# Patient Record
Sex: MALE | Race: WHITE | NOT HISPANIC OR LATINO | Employment: FULL TIME | URBAN - METROPOLITAN AREA
[De-identification: names, ages, dates, MRNs, and addresses within clinical notes are randomized per-mention and may not be internally consistent; named-entity substitution may affect disease eponyms.]

---

## 2017-02-06 RX ORDER — FLUOXETINE 20 MG/1
20 TABLET, FILM COATED ORAL DAILY
Qty: 30 TABLET | Refills: 0 | Status: SHIPPED | OUTPATIENT
Start: 2017-02-06 | End: 2017-03-01

## 2017-03-01 ENCOUNTER — OFFICE VISIT (OUTPATIENT)
Dept: INTERNAL MEDICINE | Facility: CLINIC | Age: 22
End: 2017-03-01

## 2017-03-01 VITALS
HEIGHT: 69 IN | DIASTOLIC BLOOD PRESSURE: 60 MMHG | WEIGHT: 164.6 LBS | SYSTOLIC BLOOD PRESSURE: 110 MMHG | OXYGEN SATURATION: 99 % | HEART RATE: 78 BPM | BODY MASS INDEX: 24.38 KG/M2

## 2017-03-01 DIAGNOSIS — R00.2 HEART PALPITATIONS: ICD-10-CM

## 2017-03-01 DIAGNOSIS — J20.9 ACUTE BRONCHITIS, UNSPECIFIED ORGANISM: ICD-10-CM

## 2017-03-01 DIAGNOSIS — R53.83 OTHER FATIGUE: Primary | ICD-10-CM

## 2017-03-01 PROCEDURE — 99213 OFFICE O/P EST LOW 20 MIN: CPT | Performed by: INTERNAL MEDICINE

## 2017-03-01 RX ORDER — ATENOLOL 25 MG/1
25 TABLET ORAL DAILY
Qty: 30 TABLET | Refills: 2
Start: 2017-03-01 | End: 2017-09-09 | Stop reason: SDUPTHER

## 2017-03-01 NOTE — PROGRESS NOTES
"Follow-up (requesting to have testosterone checked)    Subjective   Blade Robertson is a 21 y.o. male is here today for follow-up.    History of Present Illness   Blade reports that  He has been off the prozac appx 6mos, , also quit smoking and feels like he is doing better.  Feels like his metabolism is not good, and not gaining weight despite all the eating and the muscle building.  To get his wisdom teeth extracted tomorrow.  Atenolol helps with anxiety , taking prn      Current Outpatient Prescriptions:   •  atenolol (TENORMIN) 25 MG tablet, Take 1 tablet by mouth Daily., Disp: 30 tablet, Rfl: 2  •  azithromycin (ZITHROMAX) 250 MG tablet, Take 2 tablets the first day, then 1 tablet daily for 4 days., Disp: 6 tablet, Rfl: 0  •  fluticasone (FLONASE) 50 MCG/ACT nasal spray, 2 sprays into each nostril Daily., Disp: 1 each, Rfl: 2      The following portions of the patient's history were reviewed and updated as appropriate: allergies, current medications, past family history, past medical history, past social history, past surgical history and problem list.    Review of Systems   Constitutional: Positive for appetite change and fatigue. Negative for chills and fever.   HENT: Negative for ear discharge, ear pain, sinus pressure and sore throat.    Respiratory: Negative for cough, chest tightness and shortness of breath.    Cardiovascular: Negative for chest pain, palpitations and leg swelling.   Gastrointestinal: Negative for diarrhea, nausea and vomiting.   Musculoskeletal: Negative for arthralgias, back pain and myalgias.   Neurological: Negative for dizziness, syncope and headaches.   Psychiatric/Behavioral: Negative for confusion and sleep disturbance.       Objective   Visit Vitals   • /60   • Pulse 78   • Ht 69\" (175.3 cm)   • Wt 164 lb 9.6 oz (74.7 kg)   • SpO2 99%  Comment: ra   • BMI 24.31 kg/m2     Physical Exam   Constitutional: He is oriented to person, place, and time. He appears well-developed and " well-nourished.   HENT:   Head: Normocephalic and atraumatic.   Right Ear: External ear normal.   Left Ear: External ear normal.   Mouth/Throat: No oropharyngeal exudate.   Eyes: Conjunctivae are normal. Pupils are equal, round, and reactive to light.   Neck: Neck supple. No thyromegaly present.   Cardiovascular: Normal rate, regular rhythm and intact distal pulses.    Pulmonary/Chest: Effort normal and breath sounds normal.   Abdominal: Soft. Bowel sounds are normal. He exhibits no distension. There is no tenderness.   Musculoskeletal: He exhibits no edema.   Neurological: He is alert and oriented to person, place, and time. No cranial nerve deficit.   Skin: Skin is warm and dry.   Psychiatric: He has a normal mood and affect. Judgment normal.   Nursing note and vitals reviewed.        Results for orders placed or performed during the hospital encounter of 09/18/15   C.trachomatis/N. gonorrhoeae PCR   Result Value Ref Range    Chlamydia trachomatis, NAV Negative Negative    Neisseria gonorrhoeae, NAV Negative Negative    Please note See comments    Basic metabolic panel   Result Value Ref Range    Glucose 88 70 - 100 mg/dL    BUN 11 9 - 23 mg/dL    Creatinine 0.7 0.6 - 1.3 mg/dL    Sodium 141 132 - 146 mmol/L    Potassium 3.6 3.5 - 5.5 mmol/L    Chloride 106 99 - 109 mmol/L    CO2 27 20 - 31 mmol/L    Calcium 9.4 8.7 - 10.4 mg/dL    Est GFR by Clearance 141 ml/min/1.732    Anion Gap 8 3 - 11 mmol/L   PSA   Result Value Ref Range    PSA 0.40 0.00 - 4.00 ng/mL   CBC and Differential   Result Value Ref Range    WBC 10.85 (H) 3.50 - 10.80 K/mcL    RBC 4.97 4.20 - 5.76 M/mcL    Hemoglobin 14.9 13.1 - 17.5 g/dL    Hematocrit 41.9 38.9 - 50.9 %    MCV 84.3 80.0 - 99.0 fL    MCH 30.0 27.0 - 31.0 pg    MCHC 35.6 32.0 - 36.0 g/dL    RDW-CV 12.2 11.3 - 14.5 %    Platelets 189 150 - 450 K/mcL    Neutrophils Absolute 5.16 1.50 - 8.30 K/mcL    Lymphocytes Absolute 4.55 0.60 - 4.80 K/mcL    Monocytes Absolute 0.84 0.00 - 1.00  K/mcL    Eosinophils Absolute 0.23 0.10 - 0.30 K/mcL    Basophils Absolute 0.05 0.00 - 0.20 K/mcL    Neutrophil Rel % 47.6 41.0 - 71.0 %    Lymphocyte Rel % 41.9 24.0 - 44.0 %    Monocyte Rel % 7.7 0.0 - 12.0 %    Eosinophil Rel % 2.1 0.0 - 3.0 %    Basophil Rel % 0.5 0.0 - 1.0 %    Immature Granulocyte Rel % 0.2 0.0 - 0.6 %             Assessment/Plan   Diagnoses and all orders for this visit:    Other fatigue  -     Testosterone, Free, Total  -     Comprehensive Metabolic Panel  -     TSH  -     CBC (No Diff)  -     atenolol (TENORMIN) 25 MG tablet; Take 1 tablet by mouth Daily.    Heart palpitations  -     Testosterone, Free, Total  -     Comprehensive Metabolic Panel  -     TSH  -     CBC (No Diff)  -     atenolol (TENORMIN) 25 MG tablet; Take 1 tablet by mouth Daily.    Acute bronchitis, unspecified organism  -     fluticasone (FLONASE) 50 MCG/ACT nasal spray; 2 sprays into each nostril Daily.  -     azithromycin (ZITHROMAX) 250 MG tablet; Take 2 tablets the first day, then 1 tablet daily for 4 days.      Check labs, and recs pending results.  counselled to continue to exercise.  Commended on his quitting smoking.       Return in about 6 months (around 9/1/2017) for Next scheduled follow up.

## 2017-03-03 LAB
ALBUMIN SERPL-MCNC: 4.2 G/DL (ref 3.2–4.8)
ALBUMIN/GLOB SERPL: 1.2 G/DL (ref 1.5–2.5)
ALP SERPL-CCNC: 100 U/L (ref 25–100)
ALT SERPL W P-5'-P-CCNC: 24 U/L (ref 7–40)
ANION GAP SERPL CALCULATED.3IONS-SCNC: 3 MMOL/L (ref 3–11)
AST SERPL-CCNC: 26 U/L (ref 0–33)
BILIRUB SERPL-MCNC: 1.9 MG/DL (ref 0.3–1.2)
BUN BLD-MCNC: 22 MG/DL (ref 9–23)
BUN/CREAT SERPL: 31.4 (ref 7–25)
CALCIUM SPEC-SCNC: 9.4 MG/DL (ref 8.7–10.4)
CHLORIDE SERPL-SCNC: 103 MMOL/L (ref 99–109)
CO2 SERPL-SCNC: 33 MMOL/L (ref 20–31)
CREAT BLD-MCNC: 0.7 MG/DL (ref 0.6–1.3)
DEPRECATED RDW RBC AUTO: 39.5 FL (ref 37–54)
ERYTHROCYTE [DISTWIDTH] IN BLOOD BY AUTOMATED COUNT: 12.3 % (ref 11.3–14.5)
GFR SERPL CREATININE-BSD FRML MDRD: 140 ML/MIN/1.73
GLOBULIN UR ELPH-MCNC: 3.6 GM/DL
GLUCOSE BLD-MCNC: 91 MG/DL (ref 70–100)
HCT VFR BLD AUTO: 43.7 % (ref 38.9–50.9)
HGB BLD-MCNC: 15.1 G/DL (ref 13.1–17.5)
MCH RBC QN AUTO: 30.2 PG (ref 27–31)
MCHC RBC AUTO-ENTMCNC: 34.6 G/DL (ref 32–36)
MCV RBC AUTO: 87.4 FL (ref 80–99)
PLATELET # BLD AUTO: 193 10*3/MM3 (ref 150–450)
PMV BLD AUTO: 12 FL (ref 6–12)
POTASSIUM BLD-SCNC: 4.3 MMOL/L (ref 3.5–5.5)
PROT SERPL-MCNC: 7.8 G/DL (ref 5.7–8.2)
RBC # BLD AUTO: 5 10*6/MM3 (ref 4.2–5.76)
SODIUM BLD-SCNC: 139 MMOL/L (ref 132–146)
TSH SERPL DL<=0.05 MIU/L-ACNC: 1.33 MIU/ML (ref 0.35–5.35)
WBC NRBC COR # BLD: 11.18 10*3/MM3 (ref 3.5–10.8)

## 2017-03-03 PROCEDURE — 80053 COMPREHEN METABOLIC PANEL: CPT | Performed by: INTERNAL MEDICINE

## 2017-03-03 PROCEDURE — 85027 COMPLETE CBC AUTOMATED: CPT | Performed by: INTERNAL MEDICINE

## 2017-03-03 PROCEDURE — 84402 ASSAY OF FREE TESTOSTERONE: CPT | Performed by: INTERNAL MEDICINE

## 2017-03-03 PROCEDURE — 84443 ASSAY THYROID STIM HORMONE: CPT | Performed by: INTERNAL MEDICINE

## 2017-03-03 PROCEDURE — 84403 ASSAY OF TOTAL TESTOSTERONE: CPT | Performed by: INTERNAL MEDICINE

## 2017-03-03 RX ORDER — AZITHROMYCIN 250 MG/1
TABLET, FILM COATED ORAL
Qty: 6 TABLET | Refills: 0 | Status: SHIPPED | OUTPATIENT
Start: 2017-03-03 | End: 2017-03-15

## 2017-03-03 RX ORDER — FLUTICASONE PROPIONATE 50 MCG
2 SPRAY, SUSPENSION (ML) NASAL DAILY
Qty: 1 EACH | Refills: 2 | Status: SHIPPED | OUTPATIENT
Start: 2017-03-03 | End: 2017-03-15

## 2017-03-06 LAB
CONV COMMENT: ABNORMAL
TESTOST FREE SERPL-MCNC: 8.5 PG/ML (ref 9.3–26.5)
TESTOST SERPL-MCNC: 266 NG/DL (ref 348–1197)

## 2017-03-15 ENCOUNTER — OFFICE VISIT (OUTPATIENT)
Dept: INTERNAL MEDICINE | Facility: CLINIC | Age: 22
End: 2017-03-15

## 2017-03-15 VITALS
OXYGEN SATURATION: 99 % | BODY MASS INDEX: 23.46 KG/M2 | SYSTOLIC BLOOD PRESSURE: 126 MMHG | HEIGHT: 69 IN | HEART RATE: 74 BPM | WEIGHT: 158.4 LBS | DIASTOLIC BLOOD PRESSURE: 62 MMHG

## 2017-03-15 DIAGNOSIS — E29.1 HYPOGONADISM, TESTICULAR: Primary | ICD-10-CM

## 2017-03-15 PROCEDURE — 99213 OFFICE O/P EST LOW 20 MIN: CPT | Performed by: INTERNAL MEDICINE

## 2017-03-15 NOTE — PROGRESS NOTES
"lab resutls    Subjective   Blade Robertson is a 21 y.o. male is here today for follow-up.    History of Present Illness   Blade is here for follow up on his labs which showed low testosterone levels. He has been concerned due to his lack of muscle build despite his extensive work out.  Labs also showed mildly elevated wbc and dehydration which he attributes to his wisdom tooth extraction the day before.    Current Outpatient Prescriptions:   •  atenolol (TENORMIN) 25 MG tablet, Take 1 tablet by mouth Daily., Disp: 30 tablet, Rfl: 2  •  clomiPHENE (CLOMID) 50 MG tablet, Take 1/2 PO daily for 25 days then off for 5 days, Disp: 25 tablet, Rfl: 2      The following portions of the patient's history were reviewed and updated as appropriate: allergies, current medications, past family history, past medical history, past social history, past surgical history and problem list.    Review of Systems   Constitutional: Positive for fatigue and unexpected weight change (weight loss).   HENT: Positive for congestion (better).    Respiratory: Negative for chest tightness and shortness of breath.    Cardiovascular: Negative for chest pain.       Objective   Visit Vitals   • /62   • Pulse 74   • Ht 69\" (175.3 cm)   • Wt 158 lb 6.4 oz (71.8 kg)   • SpO2 99%  Comment: ra   • BMI 23.39 kg/m2     Physical Exam   Constitutional: He is oriented to person, place, and time. He appears well-developed and well-nourished.   HENT:   Head: Normocephalic and atraumatic.   Eyes: EOM are normal. Pupils are equal, round, and reactive to light.   Cardiovascular: Normal rate and regular rhythm.    Pulmonary/Chest: Effort normal and breath sounds normal.   Neurological: He is alert and oriented to person, place, and time.         Results for orders placed or performed in visit on 03/01/17   Testosterone, Free, Total   Result Value Ref Range    Testosterone, Total 266 (L) 348 - 1197 ng/dL    Comment Comment     Testosterone, Free 8.5 (L) 9.3 - " 26.5 pg/mL   Comprehensive Metabolic Panel   Result Value Ref Range    Glucose 91 70 - 100 mg/dL    BUN 22 9 - 23 mg/dL    Creatinine 0.70 0.60 - 1.30 mg/dL    Sodium 139 132 - 146 mmol/L    Potassium 4.3 3.5 - 5.5 mmol/L    Chloride 103 99 - 109 mmol/L    CO2 33.0 (H) 20.0 - 31.0 mmol/L    Calcium 9.4 8.7 - 10.4 mg/dL    Total Protein 7.8 5.7 - 8.2 g/dL    Albumin 4.20 3.20 - 4.80 g/dL    ALT (SGPT) 24 7 - 40 U/L    AST (SGOT) 26 0 - 33 U/L    Alkaline Phosphatase 100 25 - 100 U/L    Total Bilirubin 1.9 (H) 0.3 - 1.2 mg/dL    eGFR Non African Amer 140 >60 mL/min/1.73    Globulin 3.6 gm/dL    A/G Ratio 1.2 (L) 1.5 - 2.5 g/dL    BUN/Creatinine Ratio 31.4 (H) 7.0 - 25.0    Anion Gap 3.0 3.0 - 11.0 mmol/L   TSH   Result Value Ref Range    TSH 1.327 0.350 - 5.350 mIU/mL   CBC (No Diff)   Result Value Ref Range    WBC 11.18 (H) 3.50 - 10.80 10*3/mm3    RBC 5.00 4.20 - 5.76 10*6/mm3    Hemoglobin 15.1 13.1 - 17.5 g/dL    Hematocrit 43.7 38.9 - 50.9 %    MCV 87.4 80.0 - 99.0 fL    MCH 30.2 27.0 - 31.0 pg    MCHC 34.6 32.0 - 36.0 g/dL    RDW 12.3 11.3 - 14.5 %    RDW-SD 39.5 37.0 - 54.0 fl    MPV 12.0 6.0 - 12.0 fL    Platelets 193 150 - 450 10*3/mm3             Assessment/Plan   Diagnoses and all orders for this visit:    Hypogonadism, testicular  -     clomiPHENE (CLOMID) 50 MG tablet; Take 1/2 PO daily for 25 days then off for 5 days  -     Testosterone, Free, Total; Future  -     FSH & LH; Future    Counseled extensively re: the pros and cons of testosterone replacement, will recheck labs at follow up in 3 months and trial of clomid in the meantime.             Return in about 3 months (around 6/15/2017) for Next scheduled follow up.

## 2017-06-12 ENCOUNTER — TELEPHONE (OUTPATIENT)
Dept: INTERNAL MEDICINE | Facility: CLINIC | Age: 22
End: 2017-06-12

## 2017-06-12 NOTE — TELEPHONE ENCOUNTER
QUESTION ABOUT MEDICATION ABOUT HIS TESTERONE MEDICATION. HE NEEDS TO VERIFY HOW HE SHOULD BE TAKING IT. YOU CAN REACH HIM BACK -[phone]

## 2017-06-12 NOTE — TELEPHONE ENCOUNTER
Pt wanted to know if he was to continue his clomid until he was seen next month, advised yes, he should take all meds as prescribed.

## 2017-07-11 ENCOUNTER — LAB (OUTPATIENT)
Dept: INTERNAL MEDICINE | Facility: CLINIC | Age: 22
End: 2017-07-11

## 2017-07-11 DIAGNOSIS — E29.1 HYPOGONADISM, TESTICULAR: ICD-10-CM

## 2017-07-11 LAB
FSH SERPL-ACNC: 5 MIU/ML
LH SERPL-ACNC: 4.3 MIU/ML

## 2017-07-11 PROCEDURE — 83001 ASSAY OF GONADOTROPIN (FSH): CPT | Performed by: INTERNAL MEDICINE

## 2017-07-11 PROCEDURE — 36415 COLL VENOUS BLD VENIPUNCTURE: CPT

## 2017-07-11 PROCEDURE — 83002 ASSAY OF GONADOTROPIN (LH): CPT | Performed by: INTERNAL MEDICINE

## 2017-07-11 PROCEDURE — 84403 ASSAY OF TOTAL TESTOSTERONE: CPT | Performed by: INTERNAL MEDICINE

## 2017-07-11 PROCEDURE — 84402 ASSAY OF FREE TESTOSTERONE: CPT | Performed by: INTERNAL MEDICINE

## 2017-07-13 LAB
CONV COMMENT: NORMAL
TESTOST FREE SERPL-MCNC: 13.3 PG/ML (ref 9.3–26.5)
TESTOST SERPL-MCNC: 379 NG/DL (ref 348–1197)

## 2017-07-18 ENCOUNTER — OFFICE VISIT (OUTPATIENT)
Dept: INTERNAL MEDICINE | Facility: CLINIC | Age: 22
End: 2017-07-18

## 2017-07-18 VITALS
WEIGHT: 165.8 LBS | DIASTOLIC BLOOD PRESSURE: 64 MMHG | HEART RATE: 65 BPM | SYSTOLIC BLOOD PRESSURE: 130 MMHG | BODY MASS INDEX: 24.48 KG/M2 | OXYGEN SATURATION: 99 %

## 2017-07-18 DIAGNOSIS — E29.1 HYPOGONADISM, TESTICULAR: Primary | ICD-10-CM

## 2017-07-18 PROCEDURE — 99213 OFFICE O/P EST LOW 20 MIN: CPT | Performed by: INTERNAL MEDICINE

## 2017-07-18 NOTE — PROGRESS NOTES
Hypogonadism    Subjective   Blade Robertson is a 22 y.o. male is here today for follow-up.    History of Present Illness   Blade has had a normal development , denies any genital atrophy, or weakness. Main concern is him not gaining weight and bulking up , as  He wants to train for [employer].    Current Outpatient Prescriptions:   •  atenolol (TENORMIN) 25 MG tablet, Take 1 tablet by mouth Daily., Disp: 30 tablet, Rfl: 2      The following portions of the patient's history were reviewed and updated as appropriate: allergies, current medications, past family history, past medical history, past social history, past surgical history and problem list.    Review of Systems   Constitutional: Negative.  Negative for chills and fever.   HENT: Negative for ear discharge, ear pain, sinus pressure and sore throat.    Respiratory: Negative for cough, chest tightness and shortness of breath.    Cardiovascular: Negative for chest pain, palpitations and leg swelling.   Gastrointestinal: Negative for diarrhea, nausea and vomiting.   Musculoskeletal: Negative for arthralgias, back pain and myalgias.   Neurological: Negative for dizziness, syncope and headaches.   Psychiatric/Behavioral: Negative for confusion and sleep disturbance.       Objective   /64  Pulse 65  Wt 165 lb 12.8 oz (75.2 kg)  SpO2 99% Comment: ra  BMI 24.48 kg/m2  Physical Exam   Constitutional: He is oriented to person, place, and time. He appears well-developed and well-nourished.   HENT:   Head: Normocephalic and atraumatic.   Right Ear: External ear normal.   Left Ear: External ear normal.   Mouth/Throat: No oropharyngeal exudate.   Eyes: Conjunctivae are normal. Pupils are equal, round, and reactive to light.   Neck: Neck supple. No thyromegaly present.   Cardiovascular: Normal rate, regular rhythm and intact distal pulses.    Pulmonary/Chest: Effort normal and breath sounds normal.   Abdominal: Soft. Bowel sounds are normal. He exhibits no  distension. There is no tenderness.   Musculoskeletal: He exhibits no edema.   Neurological: He is alert and oriented to person, place, and time. No cranial nerve deficit.   Skin: Skin is warm and dry.   Psychiatric: He has a normal mood and affect. Judgment normal.   Nursing note and vitals reviewed.        Results for orders placed or performed in visit on 07/11/17   Follicle Stimulating Hormone   Result Value Ref Range    FSH 5.00 mIU/mL   Luteinizing Hormone   Result Value Ref Range    LH 4.30 mIU/mL   Testosterone, Free, Total   Result Value Ref Range    Testosterone, Total 379 348 - 1197 ng/dL    Comment Comment     Testosterone, Free 13.3 9.3 - 26.5 pg/mL             Assessment/Plan   Diagnoses and all orders for this visit:    Hypogonadism, testicular  -     Testosterone, Free, Total; Future  -     Follicle Stimulating Hormone; Future  -     Luteinizing Hormone; Future  -     Prolactin; Future  -     TSH; Future  -     Insulin-like Growth Factor; Future    Pt. Indicating he would like another rx for clomid to help his testosterone levels. Adv. If labs worse, may benefit from Endo referral, d/w Dr. Diaz who will see him if needed to reassure him.             Return in about 3 months (around 10/18/2017) for Recheck 1st week of november.

## 2017-09-09 DIAGNOSIS — R53.83 OTHER FATIGUE: ICD-10-CM

## 2017-09-09 DIAGNOSIS — R00.2 HEART PALPITATIONS: ICD-10-CM

## 2017-09-10 RX ORDER — FLUOXETINE 20 MG/1
TABLET, FILM COATED ORAL
Qty: 30 TABLET | Refills: 0 | Status: SHIPPED | OUTPATIENT
Start: 2017-09-10 | End: 2017-11-02 | Stop reason: SDUPTHER

## 2017-09-11 RX ORDER — ATENOLOL 25 MG/1
TABLET ORAL
Qty: 30 TABLET | Refills: 4 | Status: SHIPPED | OUTPATIENT
Start: 2017-09-11 | End: 2018-11-04 | Stop reason: SDUPTHER

## 2017-10-27 ENCOUNTER — LAB (OUTPATIENT)
Dept: INTERNAL MEDICINE | Facility: CLINIC | Age: 22
End: 2017-10-27

## 2017-10-27 DIAGNOSIS — E29.1 HYPOGONADISM, TESTICULAR: ICD-10-CM

## 2017-10-27 LAB
FSH SERPL-ACNC: 4.4 MIU/ML
LH SERPL-ACNC: 4.8 MIU/ML
PROLACTIN SERPL-MCNC: 8.8 NG/ML
TSH SERPL DL<=0.05 MIU/L-ACNC: 1 MIU/ML (ref 0.35–5.35)

## 2017-10-27 PROCEDURE — 84402 ASSAY OF FREE TESTOSTERONE: CPT | Performed by: INTERNAL MEDICINE

## 2017-10-27 PROCEDURE — 83002 ASSAY OF GONADOTROPIN (LH): CPT | Performed by: INTERNAL MEDICINE

## 2017-10-27 PROCEDURE — 84443 ASSAY THYROID STIM HORMONE: CPT | Performed by: INTERNAL MEDICINE

## 2017-10-27 PROCEDURE — 84403 ASSAY OF TOTAL TESTOSTERONE: CPT | Performed by: INTERNAL MEDICINE

## 2017-10-27 PROCEDURE — 84305 ASSAY OF SOMATOMEDIN: CPT | Performed by: INTERNAL MEDICINE

## 2017-10-27 PROCEDURE — 83001 ASSAY OF GONADOTROPIN (FSH): CPT | Performed by: INTERNAL MEDICINE

## 2017-10-27 PROCEDURE — 84146 ASSAY OF PROLACTIN: CPT | Performed by: INTERNAL MEDICINE

## 2017-11-02 LAB
TESTOST FREE SERPL-MCNC: 8.9 PG/ML (ref 9.3–26.5)
TESTOST SERPL-MCNC: 292 NG/DL (ref 264–916)

## 2017-11-02 RX ORDER — FLUOXETINE 20 MG/1
TABLET, FILM COATED ORAL
Qty: 30 TABLET | Refills: 0 | Status: SHIPPED | OUTPATIENT
Start: 2017-11-02 | End: 2017-11-03 | Stop reason: SDUPTHER

## 2017-11-03 ENCOUNTER — OFFICE VISIT (OUTPATIENT)
Dept: INTERNAL MEDICINE | Facility: CLINIC | Age: 22
End: 2017-11-03

## 2017-11-03 VITALS
OXYGEN SATURATION: 99 % | DIASTOLIC BLOOD PRESSURE: 78 MMHG | BODY MASS INDEX: 25.25 KG/M2 | WEIGHT: 171 LBS | SYSTOLIC BLOOD PRESSURE: 138 MMHG | HEART RATE: 76 BPM

## 2017-11-03 DIAGNOSIS — F43.0 REACTION, SITUATIONAL, ACUTE, TO STRESS: ICD-10-CM

## 2017-11-03 DIAGNOSIS — E23.0 PITUITARY HYPOGONADISM (HCC): Primary | ICD-10-CM

## 2017-11-03 PROCEDURE — 99213 OFFICE O/P EST LOW 20 MIN: CPT | Performed by: INTERNAL MEDICINE

## 2017-11-03 RX ORDER — FLUOXETINE 20 MG/1
20 TABLET, FILM COATED ORAL DAILY
Qty: 90 TABLET | Refills: 3 | Status: SHIPPED | OUTPATIENT
Start: 2017-11-03 | End: 2018-11-05 | Stop reason: SDUPTHER

## 2017-11-03 NOTE — PROGRESS NOTES
Follow-up (Hypogonadism)    Subjective   Blade Robertson is a 22 y.o. male is here today for follow-up.    History of Present Illness   Has started gaining weight, hence feels better,he is able to go to GYM an work out regulalry.  He did have issues with stress, hence back ion his prozac.  Has not been on testosterone, we did try clomid briefly for 3 months, with some improvement, but now its trending back again.    Current Outpatient Prescriptions:   •  atenolol (TENORMIN) 25 MG tablet, TAKE ONE TO ONE AND ONE-HALF (1  1/2) TABLET BY MOUTH AS NEEDED DAILY, Disp: 30 tablet, Rfl: 4  •  FLUoxetine (PROzac) 20 MG tablet, Take 1 tablet by mouth Daily., Disp: 90 tablet, Rfl: 3      The following portions of the patient's history were reviewed and updated as appropriate: allergies, current medications, past family history, past medical history, past social history, past surgical history and problem list.    Review of Systems   Constitutional: Negative.  Negative for chills and fever.   HENT: Negative for ear discharge, ear pain, sinus pressure and sore throat.    Respiratory: Negative for cough, chest tightness and shortness of breath.    Cardiovascular: Negative for chest pain, palpitations and leg swelling.   Gastrointestinal: Negative for diarrhea, nausea and vomiting.   Musculoskeletal: Negative for arthralgias, back pain and myalgias.   Neurological: Negative for dizziness, syncope and headaches.   Psychiatric/Behavioral: Negative for confusion and sleep disturbance.       Objective   /78  Pulse 76  Wt 171 lb (77.6 kg)  SpO2 99%  BMI 25.25 kg/m2  Physical Exam   Constitutional: He is oriented to person, place, and time. He appears well-developed and well-nourished.   HENT:   Head: Normocephalic and atraumatic.   Right Ear: External ear normal.   Left Ear: External ear normal.   Mouth/Throat: No oropharyngeal exudate.   Eyes: Conjunctivae are normal. Pupils are equal, round, and reactive to light.   Neck:  Neck supple. No thyromegaly present.   Cardiovascular: Normal rate, regular rhythm and intact distal pulses.    Pulmonary/Chest: Effort normal and breath sounds normal.   Abdominal: Soft. Bowel sounds are normal. He exhibits no distension. There is no tenderness.   Musculoskeletal: He exhibits no edema.   Neurological: He is alert and oriented to person, place, and time. No cranial nerve deficit.   Skin: Skin is warm and dry.   Psychiatric: He has a normal mood and affect. Judgment normal.   Nursing note and vitals reviewed.        Results for orders placed or performed in visit on 10/27/17   Testosterone, Free, Total   Result Value Ref Range    Testosterone, Total 292 264 - 916 ng/dL    Testosterone, Free 8.9 (L) 9.3 - 26.5 pg/mL   Follicle Stimulating Hormone   Result Value Ref Range    FSH 4.40 mIU/mL   Luteinizing Hormone   Result Value Ref Range    LH 4.80 mIU/mL   Prolactin   Result Value Ref Range    Prolactin 8.80 ng/mL   TSH   Result Value Ref Range    TSH 1.002 0.350 - 5.350 mIU/mL   Insulin-like Growth Factor   Result Value Ref Range    Insulin-Like Growth Factor-1 259 115 - 355 ng/mL             Assessment/Plan   Diagnoses and all orders for this visit:    Pituitary hypogonadism  -     Ambulatory Referral to Endocrinology    Reaction, situational, acute, to stress  -     FLUoxetine (PROzac) 20 MG tablet; Take 1 tablet by mouth Daily.    Reviewed labs with Pt. Testosterone trending down again, Pituitary enzymes are low as well, indicating secondary cause. Unsure if MRI will help at this point , as all other pituitary labs including IGF are normal.  Will refer to Endo for further eval. D/w Dr. Diaz.           Return in about 6 months (around 5/3/2018) for Next scheduled follow up.

## 2017-11-04 LAB — IGF-I SERPL-MCNC: 259 NG/ML (ref 115–355)

## 2017-12-18 ENCOUNTER — OFFICE VISIT (OUTPATIENT)
Dept: ENDOCRINOLOGY | Facility: CLINIC | Age: 22
End: 2017-12-18

## 2017-12-18 VITALS
HEIGHT: 69 IN | WEIGHT: 171 LBS | BODY MASS INDEX: 25.33 KG/M2 | OXYGEN SATURATION: 99 % | SYSTOLIC BLOOD PRESSURE: 128 MMHG | DIASTOLIC BLOOD PRESSURE: 70 MMHG | HEART RATE: 69 BPM

## 2017-12-18 DIAGNOSIS — E23.0 PITUITARY HYPOGONADISM (HCC): Primary | ICD-10-CM

## 2017-12-18 PROCEDURE — 99243 OFF/OP CNSLTJ NEW/EST LOW 30: CPT | Performed by: INTERNAL MEDICINE

## 2017-12-18 NOTE — PROGRESS NOTES
Blade KHANH Robertson 22 y.o.  CC:Establish Care (New patient being seen a the request of Dr Bull for a consultation regarding pituitary hypogonadism)      [facility]: Establish Care (New patient being seen a the request of Dr Bull for a consultation regarding pituitary hypogonadism)    Not adding muscle mass like he thought he should at the gym   Checked T and was low   Treated with clomid and he had more energy and did build muscle   Facial hair less than normal   Lower libido   Denies ED   Primary symptom was not building muscle   Denies taking supplement from the gym   Has a lot of frontal headaches     No Known Allergies    Current Outpatient Prescriptions:   •  atenolol (TENORMIN) 25 MG tablet, TAKE ONE TO ONE AND ONE-HALF (1  1/2) TABLET BY MOUTH AS NEEDED DAILY, Disp: 30 tablet, Rfl: 4  •  FLUoxetine (PROzac) 20 MG tablet, Take 1 tablet by mouth Daily., Disp: 90 tablet, Rfl: 3  Patient Active Problem List    Diagnosis   • Pituitary hypogonadism [E23.0]   • Asthma [J45.909]   • GERD (gastroesophageal reflux disease) [K21.9]   • Panic attack as reaction to stress [F41.0, F43.0]     Overview Note:     Last Impression: 12 May 2015  Adv. to start atenolol tonight, and then after 2 days use      prn.Take vistaril to sleep, so that he gets 8-9 hrs rest.cont prozac at current dose, as he      has dizziness with med change.  Bre Bull (Internal Medicine)     • Reaction, situational, acute, to stress [F43.0]     Overview Note:     Last Impression: 22 May 2015  Better on prn atenolol. Continue current regimen.       Bre Bull (Internal Medicine)     • Shortness of breath [R06.02]     Overview Note:     Last Impression: 06 Oct 2014  Recheck CBC since noted to be abnormal. Will complete      FMLA paperwork for pt to return to work on 10/5/14 as scheduled. RTC if symptoms recur,      have not ruled out panic attack.  Angelique Ellison (Internal Medicine)     • UTI (urinary tract infection) [N39.0]     Overview  Note:     Last Impression: 18 Sep 2015  check labs to r/o prostatitis, start abx.  Bre Bull      (Internal Medicine)     • Benign paroxysmal positional vertigo [H81.10]     Overview Note:     Likely related to recent allergy symptoms and ETD. Will treat underlying ETD with steroid      nasal spray and antihistamine. Can add steroid taper if no improvement. Work excuse      for today and tomorrow.     • Eustachian tube dysfunction [H69.80]     Overview Note:     Use flonase as directed. Take antihistamine as directed. Call if no better, discussed      medrol dose pack if no improvement.       Review of Systems   Constitutional: Negative for activity change, appetite change, chills, diaphoresis, fatigue, fever and unexpected weight change.   HENT: Negative for congestion, dental problem, drooling, ear discharge, ear pain, facial swelling, hearing loss, mouth sores, nosebleeds, postnasal drip, rhinorrhea, sinus pressure, sneezing, sore throat, tinnitus, trouble swallowing and voice change.    Eyes: Negative for photophobia, pain, discharge, redness, itching and visual disturbance.   Respiratory: Negative for apnea, cough, choking, chest tightness, shortness of breath, wheezing and stridor.    Cardiovascular: Negative for chest pain, palpitations and leg swelling.   Gastrointestinal: Negative for abdominal distention, abdominal pain, anal bleeding, blood in stool, constipation, diarrhea, nausea, rectal pain and vomiting.   Endocrine: Negative for cold intolerance, heat intolerance, polydipsia, polyphagia and polyuria.   Genitourinary: Negative for decreased urine volume, difficulty urinating, dysuria, enuresis, flank pain, frequency, genital sores, hematuria and urgency.        Low libido    Musculoskeletal: Negative for arthralgias, back pain, gait problem, joint swelling, myalgias, neck pain and neck stiffness.   Skin: Negative for color change, pallor, rash and wound.        Beard thinning   "  Allergic/Immunologic: Negative for environmental allergies, food allergies and immunocompromised state.   Neurological: Negative for dizziness, tremors, seizures, syncope, facial asymmetry, speech difficulty, weakness, light-headedness, numbness and headaches.   Hematological: Negative for adenopathy. Does not bruise/bleed easily.   Psychiatric/Behavioral: Negative for agitation, behavioral problems, confusion, decreased concentration, dysphoric mood, hallucinations, self-injury, sleep disturbance and suicidal ideas. The patient is not nervous/anxious and is not hyperactive.      Social History     Social History   • Marital status:      Spouse name: N/A   • Number of children: N/A   • Years of education: N/A     Occupational History   • Not on file.     Social History Main Topics   • Smoking status: Current Every Day Smoker   • Smokeless tobacco: Never Used   • Alcohol use No   • Drug use: No   • Sexual activity: Defer     Other Topics Concern   • Not on file     Social History Narrative     No family history on file.  /70  Pulse 69  Ht 175.3 cm (69\")  Wt 77.6 kg (171 lb)  SpO2 99%  BMI 25.25 kg/m2  Physical Exam   Constitutional: He is oriented to person, place, and time. He appears well-developed and well-nourished.   HENT:   Head: Normocephalic and atraumatic.   Nose: Nose normal.   Mouth/Throat: Oropharynx is clear and moist.   Eyes: Conjunctivae, EOM and lids are normal. Pupils are equal, round, and reactive to light.   Neck: Trachea normal and normal range of motion. Neck supple. Carotid bruit is not present. No tracheal deviation present. No thyroid mass and no thyromegaly present.   Cardiovascular: Normal rate, regular rhythm, normal heart sounds and intact distal pulses.  Exam reveals no gallop and no friction rub.    No murmur heard.  Pulmonary/Chest: Effort normal and breath sounds normal. No respiratory distress. He has no wheezes.   Musculoskeletal: Normal range of motion. He " exhibits no edema or deformity.   Lymphadenopathy:     He has no cervical adenopathy.   Neurological: He is alert and oriented to person, place, and time. He has normal reflexes. He displays normal reflexes. No cranial nerve deficit.   Skin: Skin is warm and dry. No rash noted. No cyanosis or erythema. Nails show no clubbing.   Psychiatric: He has a normal mood and affect. His speech is normal and behavior is normal. Judgment and thought content normal. Cognition and memory are normal.   Nursing note and vitals reviewed.    Results for orders placed or performed in visit on 10/27/17   Testosterone, Free, Total   Result Value Ref Range    Testosterone, Total 292 264 - 916 ng/dL    Testosterone, Free 8.9 (L) 9.3 - 26.5 pg/mL   Follicle Stimulating Hormone   Result Value Ref Range    FSH 4.40 mIU/mL   Luteinizing Hormone   Result Value Ref Range    LH 4.80 mIU/mL   Prolactin   Result Value Ref Range    Prolactin 8.80 ng/mL   TSH   Result Value Ref Range    TSH 1.002 0.350 - 5.350 mIU/mL   Insulin-like Growth Factor   Result Value Ref Range    Insulin-Like Growth Factor-1 259 115 - 355 ng/mL     Problem List Items Addressed This Visit        Endocrine    Pituitary hypogonadism - Primary     Update total and free T and shbg  Discussed options for treatment including topical and injectable testosterone   Will initiate therapy if continued low function   F/u 6 months          Relevant Orders    Testosterone Free Direct    Testosterone    Sex Horm Binding Globulin    CT head wo contrast        Return in about 6 months (around 6/18/2018) for Recheck 30 min .    Allyson Duenas MA  Signed Roxanna Diaz MD

## 2017-12-19 NOTE — ASSESSMENT & PLAN NOTE
Update total and free T and shbg  Discussed options for treatment including topical and injectable testosterone   Will initiate therapy if continued low function   F/u 6 months

## 2017-12-21 ENCOUNTER — HOSPITAL ENCOUNTER (OUTPATIENT)
Dept: CT IMAGING | Facility: HOSPITAL | Age: 22
Discharge: HOME | End: 2017-12-21
Attending: INTERNAL MEDICINE | Admitting: INTERNAL MEDICINE

## 2017-12-21 PROCEDURE — 70450 CT HEAD/BRAIN W/O DYE: CPT

## 2017-12-23 LAB — TESTOST SERPL-MCNC: 424.05 NG/DL (ref 123.06–813.86)

## 2017-12-23 PROCEDURE — 84403 ASSAY OF TOTAL TESTOSTERONE: CPT | Performed by: INTERNAL MEDICINE

## 2017-12-23 PROCEDURE — 84402 ASSAY OF FREE TESTOSTERONE: CPT | Performed by: INTERNAL MEDICINE

## 2017-12-23 PROCEDURE — 84270 ASSAY OF SEX HORMONE GLOBUL: CPT | Performed by: INTERNAL MEDICINE

## 2017-12-26 LAB — SHBG SERPL-SCNC: 13.1 NMOL/L (ref 16.5–55.9)

## 2017-12-27 LAB — TESTOST FREE SERPL-MCNC: 18.8 PG/ML (ref 9.3–26.5)

## 2018-01-06 RX ORDER — OMEPRAZOLE 20 MG/1
CAPSULE, DELAYED RELEASE ORAL
Qty: 30 CAPSULE | Refills: 2 | Status: SHIPPED | OUTPATIENT
Start: 2018-01-06 | End: 2018-05-15 | Stop reason: SDUPTHER

## 2018-02-06 ENCOUNTER — OFFICE VISIT (OUTPATIENT)
Dept: INTERNAL MEDICINE | Facility: CLINIC | Age: 23
End: 2018-02-06

## 2018-02-06 VITALS
WEIGHT: 170 LBS | HEIGHT: 69 IN | DIASTOLIC BLOOD PRESSURE: 64 MMHG | BODY MASS INDEX: 25.18 KG/M2 | SYSTOLIC BLOOD PRESSURE: 124 MMHG | HEART RATE: 72 BPM | OXYGEN SATURATION: 99 %

## 2018-02-06 DIAGNOSIS — H10.33 ACUTE CONJUNCTIVITIS OF BOTH EYES, UNSPECIFIED ACUTE CONJUNCTIVITIS TYPE: Primary | ICD-10-CM

## 2018-02-06 PROCEDURE — 99213 OFFICE O/P EST LOW 20 MIN: CPT | Performed by: NURSE PRACTITIONER

## 2018-02-06 NOTE — PROGRESS NOTES
Subjective   Blade Robertson is a 22 y.o. male.   Eyes burning approx 1 week ago.  Went to urgent care Friday and was prescribed erythromycin opthal ointment  Using it 6 times a day.  Symptoms may be a bit better in the right eye.    Eyes are red, itchy, dry.  No crusting on eye lids/lashes.   Has runny nose.  No HA, sore throat or ear pain,fever or chills..  No visual changes.      History of Present Illness     Current Outpatient Prescriptions on File Prior to Visit   Medication Sig Dispense Refill   • atenolol (TENORMIN) 25 MG tablet TAKE ONE TO ONE AND ONE-HALF (1  1/2) TABLET BY MOUTH AS NEEDED DAILY 30 tablet 4   • FLUoxetine (PROzac) 20 MG tablet Take 1 tablet by mouth Daily. 90 tablet 3   • omeprazole (priLOSEC) 20 MG capsule TAKE ONE CAPSULE BY MOUTH EVERY MORNING BEFORE BREAKFAST 30 capsule 2     No current facility-administered medications on file prior to visit.        No Known Allergies    Past Medical History:   Diagnosis Date   • Low testosterone in male        Past Surgical History:   Procedure Laterality Date   • APPENDECTOMY         No family history on file.    Social History     Social History   • Marital status:      Spouse name: N/A   • Number of children: N/A   • Years of education: N/A     Occupational History   • Not on file.     Social History Main Topics   • Smoking status: Former Smoker     Quit date: 2016   • Smokeless tobacco: Never Used   • Alcohol use No   • Drug use: No   • Sexual activity: Defer     Other Topics Concern   • Not on file     Social History Narrative       Review of Systems   Constitutional: Negative for activity change, appetite change, chills and fever.   HENT: Positive for ear pain and postnasal drip. Negative for sore throat.    Eyes: Positive for redness and itching. Negative for photophobia, discharge and visual disturbance.   Respiratory: Negative for cough and wheezing.    Cardiovascular: Negative for chest pain.   Gastrointestinal: Negative for abdominal  "pain, diarrhea, nausea and vomiting.   Endocrine: Negative.    Genitourinary: Negative for difficulty urinating.   Musculoskeletal: Negative for myalgias.   Skin: Negative.    Neurological: Negative for dizziness.       /64  Pulse 72  Ht 175.3 cm (69\")  Wt 77.1 kg (170 lb)  SpO2 99%  BMI 25.1 kg/m2    Objective   Physical Exam   Constitutional: He appears well-developed and well-nourished.   HENT:   Head: Normocephalic.   Right Ear: External ear normal.   Left Ear: External ear normal.   Mouth/Throat: Oropharynx is clear and moist.   TM dull.  Non tender over sinuses.  Throat pink.     Eyes: Pupils are equal, round, and reactive to light. Right eye exhibits no discharge. Left eye exhibits no discharge.   Conjunctivae, red   Neck: Normal range of motion. Neck supple.   Lymphadenopathy:     He has no cervical adenopathy.   Neurological: He is alert.   Skin: Skin is warm and dry.   Pink, no rash   Psychiatric: He has a normal mood and affect.   Nursing note and vitals reviewed.      Results for orders placed or performed in visit on 12/18/17   Testosterone Free Direct   Result Value Ref Range    Testosterone, Free 18.8 9.3 - 26.5 pg/mL   Testosterone   Result Value Ref Range    Testosterone, Total 424.05 123.06 - 813.86 ng/dL   Sex Horm Binding Globulin   Result Value Ref Range    Sex Hormone Binding Globulin 13.1 (L) 16.5 - 55.9 nmol/L     Assessment/Plan   Ameen was seen today for eye burn.    Diagnoses and all orders for this visit:    Acute conjunctivitis of both eyes, unspecified acute conjunctivitis type            "

## 2018-02-06 NOTE — PATIENT INSTRUCTIONS
Drink plenty of fluids. Antihistamine of choice.  Use Liquid tears liberally.  Naphcon allergy eye drops.  RTC if not improving

## 2018-05-15 ENCOUNTER — TELEPHONE (OUTPATIENT)
Dept: INTERNAL MEDICINE | Facility: CLINIC | Age: 23
End: 2018-05-15

## 2018-05-15 ENCOUNTER — OFFICE VISIT (OUTPATIENT)
Dept: INTERNAL MEDICINE | Facility: CLINIC | Age: 23
End: 2018-05-15

## 2018-05-15 VITALS
WEIGHT: 173.6 LBS | SYSTOLIC BLOOD PRESSURE: 124 MMHG | DIASTOLIC BLOOD PRESSURE: 76 MMHG | HEART RATE: 57 BPM | OXYGEN SATURATION: 99 % | BODY MASS INDEX: 25.64 KG/M2

## 2018-05-15 DIAGNOSIS — R53.83 FATIGUE, UNSPECIFIED TYPE: ICD-10-CM

## 2018-05-15 DIAGNOSIS — K21.9 GASTROESOPHAGEAL REFLUX DISEASE WITHOUT ESOPHAGITIS: ICD-10-CM

## 2018-05-15 DIAGNOSIS — M76.52 PATELLAR TENDONITIS OF LEFT KNEE: ICD-10-CM

## 2018-05-15 DIAGNOSIS — E23.0 PITUITARY HYPOGONADISM (HCC): ICD-10-CM

## 2018-05-15 DIAGNOSIS — F43.0 REACTION, SITUATIONAL, ACUTE, TO STRESS: Primary | ICD-10-CM

## 2018-05-15 PROCEDURE — 99214 OFFICE O/P EST MOD 30 MIN: CPT | Performed by: INTERNAL MEDICINE

## 2018-05-15 RX ORDER — OMEPRAZOLE 20 MG/1
20 CAPSULE, DELAYED RELEASE ORAL DAILY
Qty: 30 CAPSULE | Refills: 5 | Status: SHIPPED | OUTPATIENT
Start: 2018-05-15 | End: 2018-12-04 | Stop reason: SDUPTHER

## 2018-05-15 NOTE — TELEPHONE ENCOUNTER
PT WAS SEEN IN OUR OFFICE TODAY BY DR. BAEZ 05/15. THE CHECK OUT NOTES LISTED FOR THE PT TO BE SEEN BY DR. BAEZ IN 6 MONTHS. IT WAS ALSO NOTED TO SCHEDULE THE PT FOR A 1 MONTH FOLLOW UP WITH DR. DE LEÓN. IT WOULD BE 6 MONTH SINCE HIS LAST APT WITH DR. DE LEÓN. THE SCHEDULE DID NOT ANY AVAILABILITY FOR NEXT MONTH. WANTED TO CHECK WHEN DR. DE LEÓN WOULD BE ABLE TO FIT PT IN. THE PT LEFT CALL BACK # [phone] FOR ME OR MA TO CALL BACK WITH SCHEDULING. PT WAS IN [place] AND NEEDED TO LEAVE MESSAGE INSTEAD

## 2018-05-15 NOTE — PROGRESS NOTES
Follow-up (low testosterone, stress, and knee injury)    Subjective   Blade Robertson is a 22 y.o. male is here today for follow-up.    History of Present Illness   Blade is here for a follow up on his chronic stress, fatigue and low T. Has a hard time getting up in the am, and be motivated for work out.  HAs not been able to workout in the last 2 weeks, would like to have his levels tested.  He is s/p eval with Dr. Diaz. Testosterone levels were up in the 400's. Wasn't sure of follow up.  HE injured his left knee and now it hurts above the patella.  Injury took place > 1 mo ago.    Current Outpatient Prescriptions:   •  atenolol (TENORMIN) 25 MG tablet, TAKE ONE TO ONE AND ONE-HALF (1  1/2) TABLET BY MOUTH AS NEEDED DAILY, Disp: 30 tablet, Rfl: 4  •  FLUoxetine (PROzac) 20 MG tablet, Take 1 tablet by mouth Daily., Disp: 90 tablet, Rfl: 3  •  omeprazole (priLOSEC) 20 MG capsule, Take 1 capsule by mouth Daily., Disp: 30 capsule, Rfl: 5      The following portions of the patient's history were reviewed and updated as appropriate: allergies, current medications, past family history, past medical history, past social history, past surgical history and problem list.    Review of Systems   Constitutional: Positive for fatigue. Negative for chills and fever.   HENT: Negative for ear discharge, ear pain, sinus pressure and sore throat.    Respiratory: Negative for cough, chest tightness and shortness of breath.    Cardiovascular: Negative for chest pain, palpitations and leg swelling.   Gastrointestinal: Negative for diarrhea, nausea and vomiting.   Musculoskeletal: Negative for arthralgias, back pain and myalgias.   Neurological: Negative for dizziness, syncope and headaches.   Psychiatric/Behavioral: Negative for confusion and sleep disturbance.       Objective   /76   Pulse 57   Wt 78.7 kg (173 lb 9.6 oz)   SpO2 99%   BMI 25.64 kg/m²   Physical Exam   Constitutional: He is oriented to person, place, and  time. He appears well-developed and well-nourished.   HENT:   Head: Normocephalic and atraumatic.   Right Ear: External ear normal.   Left Ear: External ear normal.   Mouth/Throat: No oropharyngeal exudate (+pnd, + erythema).   Eyes: Conjunctivae are normal. Pupils are equal, round, and reactive to light.   Neck: Neck supple. No thyromegaly present.   Cardiovascular: Normal rate and regular rhythm.    Pulmonary/Chest: Effort normal and breath sounds normal.   Abdominal: Soft. Bowel sounds are normal. He exhibits no distension. There is no tenderness.   Musculoskeletal: He exhibits tenderness (over left supra patellar area). He exhibits no edema.   Neurological: He is alert and oriented to person, place, and time. No cranial nerve deficit.   Skin: Skin is warm and dry.   Psychiatric: He has a normal mood and affect. Judgment normal.   Nursing note and vitals reviewed.        Results for orders placed or performed in visit on 12/18/17   Testosterone Free Direct   Result Value Ref Range    Testosterone, Free 18.8 9.3 - 26.5 pg/mL   Testosterone   Result Value Ref Range    Testosterone, Total 424.05 123.06 - 813.86 ng/dL   Sex Horm Binding Globulin   Result Value Ref Range    Sex Hormone Binding Globulin 13.1 (L) 16.5 - 55.9 nmol/L             Assessment/Plan   Diagnoses and all orders for this visit:    Reaction, situational, acute, to stress  Comments:  doing well on his current regimen, takes atenolol prn.    Pituitary hypogonadism  -     Testosterone; Future  -     Testosterone Free Direct; Future    Gastroesophageal reflux disease without esophagitis  -     omeprazole (priLOSEC) 20 MG capsule; Take 1 capsule by mouth Daily.    Fatigue, unspecified type  -     Comprehensive Metabolic Panel; Future  -     Vitamin B12; Future  -     Vitamin D 25 Hydroxy; Future    Patellar tendonitis of left knee  -     Ambulatory Referral to Physical Therapy Evaluate and treat      Hold off xrays, take prn ibuprofen.           Return  for Appointment with Dr. Diaz in 1 month ( 6 month f/u) and 6 month follow up with me in November.

## 2018-05-19 ENCOUNTER — LAB (OUTPATIENT)
Dept: INTERNAL MEDICINE | Facility: CLINIC | Age: 23
End: 2018-05-19

## 2018-05-19 DIAGNOSIS — E23.0 PITUITARY HYPOGONADISM (HCC): ICD-10-CM

## 2018-05-19 DIAGNOSIS — R53.83 FATIGUE, UNSPECIFIED TYPE: ICD-10-CM

## 2018-05-19 LAB
25(OH)D3 SERPL-MCNC: 22.8 NG/ML
ALBUMIN SERPL-MCNC: 4.6 G/DL (ref 3.2–4.8)
ALBUMIN/GLOB SERPL: 1.4 G/DL (ref 1.5–2.5)
ALP SERPL-CCNC: 72 U/L (ref 25–100)
ALT SERPL W P-5'-P-CCNC: 19 U/L (ref 7–40)
ANION GAP SERPL CALCULATED.3IONS-SCNC: 7 MMOL/L (ref 3–11)
AST SERPL-CCNC: 28 U/L (ref 0–33)
BILIRUB SERPL-MCNC: 1.2 MG/DL (ref 0.3–1.2)
BUN BLD-MCNC: 16 MG/DL (ref 9–23)
BUN/CREAT SERPL: 26.7 (ref 7–25)
CALCIUM SPEC-SCNC: 9.3 MG/DL (ref 8.7–10.4)
CHLORIDE SERPL-SCNC: 104 MMOL/L (ref 99–109)
CO2 SERPL-SCNC: 28 MMOL/L (ref 20–31)
CREAT BLD-MCNC: 0.6 MG/DL (ref 0.6–1.3)
GFR SERPL CREATININE-BSD FRML MDRD: >150 ML/MIN/1.73
GLOBULIN UR ELPH-MCNC: 3.2 GM/DL
GLUCOSE BLD-MCNC: 80 MG/DL (ref 70–100)
POTASSIUM BLD-SCNC: 4.2 MMOL/L (ref 3.5–5.5)
PROT SERPL-MCNC: 7.8 G/DL (ref 5.7–8.2)
SODIUM BLD-SCNC: 139 MMOL/L (ref 132–146)
VIT B12 BLD-MCNC: 802 PG/ML (ref 211–911)

## 2018-05-19 PROCEDURE — 82607 VITAMIN B-12: CPT | Performed by: INTERNAL MEDICINE

## 2018-05-19 PROCEDURE — 80053 COMPREHEN METABOLIC PANEL: CPT | Performed by: INTERNAL MEDICINE

## 2018-05-19 PROCEDURE — 82306 VITAMIN D 25 HYDROXY: CPT | Performed by: INTERNAL MEDICINE

## 2018-05-19 PROCEDURE — 84403 ASSAY OF TOTAL TESTOSTERONE: CPT | Performed by: INTERNAL MEDICINE

## 2018-05-19 PROCEDURE — 84402 ASSAY OF FREE TESTOSTERONE: CPT | Performed by: INTERNAL MEDICINE

## 2018-05-21 LAB — TESTOST SERPL-MCNC: 278.66 NG/DL (ref 123.06–813.86)

## 2018-05-22 LAB — TESTOST FREE SERPL-MCNC: 11.5 PG/ML (ref 9.3–26.5)

## 2018-06-05 ENCOUNTER — OFFICE VISIT (OUTPATIENT)
Dept: ENDOCRINOLOGY | Facility: CLINIC | Age: 23
End: 2018-06-05

## 2018-06-05 VITALS
BODY MASS INDEX: 25.92 KG/M2 | HEIGHT: 69 IN | HEART RATE: 65 BPM | DIASTOLIC BLOOD PRESSURE: 70 MMHG | SYSTOLIC BLOOD PRESSURE: 128 MMHG | WEIGHT: 175 LBS | OXYGEN SATURATION: 99 %

## 2018-06-05 DIAGNOSIS — E23.0 PITUITARY HYPOGONADISM (HCC): Primary | ICD-10-CM

## 2018-06-05 PROCEDURE — 99213 OFFICE O/P EST LOW 20 MIN: CPT | Performed by: INTERNAL MEDICINE

## 2018-06-05 NOTE — PROGRESS NOTES
"Blade Robertson 22 y.o.  CC:Follow-up and Pituitary Hypogonadism      [place]: Follow-up and Pituitary Hypogonadism    Testosterone 12/17 was normal (total and free) with normal other pituitary hormones  He is on prozac daily   Some lethargy and depressed mood   Recheck levels lower 5/18- free T 11 and total T 278   No repeat shbg ( was low with first check)   Does take over the counter supplement \"amino\" but denies steroid use   Felt like he was crashing prior to last blood work- had felt better in interim   Asked for description of  \"crashing\" - headache and sleeping more   Frontal headache (neg CT scan previously)  Used to go to gym every day and now does not feel like going   Most of symptoms are related to energy (low), lack of muscle building with working out  Also when asked, states very low libido   Has not noted change in body or facial hair    No Known Allergies    Current Outpatient Prescriptions:   •  atenolol (TENORMIN) 25 MG tablet, TAKE ONE TO ONE AND ONE-HALF (1  1/2) TABLET BY MOUTH AS NEEDED DAILY, Disp: 30 tablet, Rfl: 4  •  FLUoxetine (PROzac) 20 MG tablet, Take 1 tablet by mouth Daily., Disp: 90 tablet, Rfl: 3  •  omeprazole (priLOSEC) 20 MG capsule, Take 1 capsule by mouth Daily., Disp: 30 capsule, Rfl: 5  Patient Active Problem List    Diagnosis   • Pituitary hypogonadism [E23.0]   • Asthma [J45.909]   • GERD (gastroesophageal reflux disease) [K21.9]   • Panic attack as reaction to stress [F41.0, F43.0]     Overview Note:     Last Impression: 12 May 2015  Adv. to start atenolol tonight, and then after 2 days use      prn.Take vistaril to sleep, so that he gets 8-9 hrs rest.cont prozac at current dose, as he      has dizziness with med change.  Bre Bull (Internal Medicine)     • Reaction, situational, acute, to stress [F43.0]     Overview Note:     Last Impression: 22 May 2015  Better on prn atenolol. Continue current regimen.       Bre Bull (Internal Medicine)     • Shortness " of breath [R06.02]     Overview Note:     Last Impression: 06 Oct 2014  Recheck CBC since noted to be abnormal. Will complete      FMLA paperwork for pt to return to work on 10/5/14 as scheduled. RTC if symptoms recur,      have not ruled out panic attack.  Angelique Ellison (Internal Medicine)     • UTI (urinary tract infection) [N39.0]     Overview Note:     Last Impression: 18 Sep 2015  check labs to r/o prostatitis, start abx.  Bre Bull      (Internal Medicine)     • Benign paroxysmal positional vertigo [H81.10]     Overview Note:     Likely related to recent allergy symptoms and ETD. Will treat underlying ETD with steroid      nasal spray and antihistamine. Can add steroid taper if no improvement. Work excuse      for today and tomorrow.     • Eustachian tube dysfunction [H69.80]     Overview Note:     Use flonase as directed. Take antihistamine as directed. Call if no better, discussed      medrol dose pack if no improvement.       Review of Systems   Constitutional: Positive for fatigue. Negative for activity change, appetite change, chills, diaphoresis, fever and unexpected weight change.   HENT: Negative for congestion, dental problem, drooling, ear discharge, ear pain, facial swelling, hearing loss, mouth sores, nosebleeds, postnasal drip, rhinorrhea, sinus pressure, sneezing, sore throat, tinnitus, trouble swallowing and voice change.    Eyes: Negative for photophobia, pain, discharge, redness, itching and visual disturbance.   Respiratory: Negative for apnea, cough, choking, chest tightness, shortness of breath, wheezing and stridor.    Cardiovascular: Negative for chest pain, palpitations and leg swelling.   Gastrointestinal: Negative for abdominal distention, abdominal pain, anal bleeding, blood in stool, constipation, diarrhea, nausea, rectal pain and vomiting.   Endocrine: Negative for cold intolerance, heat intolerance, polydipsia, polyphagia and polyuria.   Genitourinary: Negative for  "decreased urine volume, difficulty urinating, dysuria, enuresis, flank pain, frequency, genital sores, hematuria and urgency.        Low libido   Musculoskeletal: Negative for arthralgias, back pain, gait problem, joint swelling, myalgias, neck pain and neck stiffness.   Skin: Negative for color change, pallor, rash and wound.   Allergic/Immunologic: Negative for environmental allergies, food allergies and immunocompromised state.   Neurological: Negative for dizziness, tremors, seizures, syncope, facial asymmetry, speech difficulty, weakness, light-headedness, numbness and headaches.   Hematological: Negative for adenopathy. Does not bruise/bleed easily.   Psychiatric/Behavioral: Negative for agitation, behavioral problems, confusion, decreased concentration, dysphoric mood, hallucinations, self-injury, sleep disturbance and suicidal ideas. The patient is not nervous/anxious and is not hyperactive.      Social History     Social History   • Marital status:      Spouse name: N/A   • Number of children: N/A   • Years of education: N/A     Occupational History   • Not on file.     Social History Main Topics   • Smoking status: Former Smoker     Quit date: 2016   • Smokeless tobacco: Never Used   • Alcohol use No   • Drug use: No   • Sexual activity: Defer     Other Topics Concern   • Not on file     Social History Narrative   • No narrative on file     No family history on file.  /70   Pulse 65   Ht 175.3 cm (69\")   Wt 79.4 kg (175 lb)   SpO2 99%   BMI 25.84 kg/m²   Physical Exam   Constitutional: He is oriented to person, place, and time. He appears well-developed and well-nourished.   HENT:   Head: Normocephalic and atraumatic.   Nose: Nose normal.   Mouth/Throat: Oropharynx is clear and moist.   Eyes: Conjunctivae, EOM and lids are normal. Pupils are equal, round, and reactive to light.   Neck: Trachea normal and normal range of motion. Neck supple. Carotid bruit is not present. No tracheal " deviation present. No thyroid mass and no thyromegaly present.   Cardiovascular: Normal rate, regular rhythm, normal heart sounds and intact distal pulses.  Exam reveals no gallop and no friction rub.    No murmur heard.  Pulmonary/Chest: Effort normal and breath sounds normal. No respiratory distress. He has no wheezes.   Musculoskeletal: Normal range of motion. He exhibits no edema or deformity.   Lymphadenopathy:     He has no cervical adenopathy.   Neurological: He is alert and oriented to person, place, and time. He has normal reflexes. He displays normal reflexes. No cranial nerve deficit.   Skin: Skin is warm and dry. No rash noted. No cyanosis or erythema. Nails show no clubbing.   Psychiatric: He has a normal mood and affect. His speech is normal and behavior is normal. Judgment and thought content normal. Cognition and memory are normal.   Nursing note and vitals reviewed.    Results for orders placed or performed in visit on 05/19/18   Testosterone   Result Value Ref Range    Testosterone, Total 278.66 123.06 - 813.86 ng/dL   Testosterone Free Direct   Result Value Ref Range    Testosterone, Free 11.5 9.3 - 26.5 pg/mL   Comprehensive Metabolic Panel   Result Value Ref Range    Glucose 80 70 - 100 mg/dL    BUN 16 9 - 23 mg/dL    Creatinine 0.60 0.60 - 1.30 mg/dL    Sodium 139 132 - 146 mmol/L    Potassium 4.2 3.5 - 5.5 mmol/L    Chloride 104 99 - 109 mmol/L    CO2 28.0 20.0 - 31.0 mmol/L    Calcium 9.3 8.7 - 10.4 mg/dL    Total Protein 7.8 5.7 - 8.2 g/dL    Albumin 4.60 3.20 - 4.80 g/dL    ALT (SGPT) 19 7 - 40 U/L    AST (SGOT) 28 0 - 33 U/L    Alkaline Phosphatase 72 25 - 100 U/L    Total Bilirubin 1.2 0.3 - 1.2 mg/dL    eGFR Non African Amer >150 >60 mL/min/1.73    Globulin 3.2 gm/dL    A/G Ratio 1.4 (L) 1.5 - 2.5 g/dL    BUN/Creatinine Ratio 26.7 (H) 7.0 - 25.0    Anion Gap 7.0 3.0 - 11.0 mmol/L   Vitamin B12   Result Value Ref Range    Vitamin B-12 802 211 - 911 pg/mL   Vitamin D 25 Hydroxy   Result  Value Ref Range    25 Hydroxy, Vitamin D 22.8 ng/ml     Problem List Items Addressed This Visit        Endocrine    Pituitary hypogonadism - Primary     Low fsh and lh with normal ct scan brain including pituitary  Low total and free T  Recheck more in normal range  Repeat 5/18 9 am was low/normal free and low total T  Denies use of supplementation  Fatigue and low libido  Pros and cons of supplementation discussed  Decreased sperm count while on testosterone supplement reviewed  F/u after lab done- will come in for lab work before 10 am         Relevant Orders    Testosterone    Testosterone Free Direct    Sex Horm Binding Globulin        Return in about 6 months (around 12/5/2018).    Allyson Duenas MA  Signed Roxanna Diaz MD

## 2018-06-06 NOTE — ASSESSMENT & PLAN NOTE
Low fsh and lh with normal ct scan brain including pituitary  Low total and free T  Recheck more in normal range  Repeat 5/18 9 am was low/normal free and low total T  Denies use of supplementation  Fatigue and low libido  Pros and cons of supplementation discussed  Decreased sperm count while on testosterone supplement reviewed  F/u after lab done- will come in for lab work before 10 am

## 2018-06-09 ENCOUNTER — TELEPHONE (OUTPATIENT)
Dept: INTERNAL MEDICINE | Facility: CLINIC | Age: 23
End: 2018-06-09

## 2018-06-09 LAB — TESTOST SERPL-MCNC: 235.77 NG/DL (ref 123.06–813.86)

## 2018-06-09 PROCEDURE — 84270 ASSAY OF SEX HORMONE GLOBUL: CPT | Performed by: INTERNAL MEDICINE

## 2018-06-09 PROCEDURE — 84403 ASSAY OF TOTAL TESTOSTERONE: CPT | Performed by: INTERNAL MEDICINE

## 2018-06-09 PROCEDURE — 84402 ASSAY OF FREE TESTOSTERONE: CPT | Performed by: INTERNAL MEDICINE

## 2018-06-09 NOTE — TELEPHONE ENCOUNTER
Patient came in this morning to get his labs, cynthia was able to get one SST tube full when she went to stick the other tube in the hub patient passed out so we were not able to get the 2nd tube today. I called the lab and they stated to just send in the one tube and they would see what they could do with just the one and if they needed more blood they would let us know to call the patient for a redraw.   Patient was informed here in the office that we would call him if we needed more blood. Pt is okay, stated he had passed out here once before.

## 2018-06-11 LAB — SHBG SERPL-SCNC: 12.5 NMOL/L (ref 16.5–55.9)

## 2018-06-13 LAB — TESTOST FREE SERPL-MCNC: 12.2 PG/ML (ref 9.3–26.5)

## 2018-06-15 ENCOUNTER — TELEPHONE (OUTPATIENT)
Dept: INTERNAL MEDICINE | Facility: CLINIC | Age: 23
End: 2018-06-15

## 2018-06-16 ENCOUNTER — TELEPHONE (OUTPATIENT)
Dept: ENDOCRINOLOGY | Facility: CLINIC | Age: 23
End: 2018-06-16

## 2018-06-16 NOTE — TELEPHONE ENCOUNTER
Please call - I would start testosterone injections 200 mg every 2 weeks.  Follow up with me in 3 months  Ok to teach him to give injection at ov and then I will prescribe testosterone and needles.  Thanks, woody

## 2018-06-18 ENCOUNTER — TELEPHONE (OUTPATIENT)
Dept: INTERNAL MEDICINE | Facility: CLINIC | Age: 23
End: 2018-06-18

## 2018-06-21 ENCOUNTER — TELEPHONE (OUTPATIENT)
Dept: INTERNAL MEDICINE | Facility: CLINIC | Age: 23
End: 2018-06-21

## 2018-06-21 NOTE — TELEPHONE ENCOUNTER
PLEASE CALL PT HE RECEIVED HIS LETTER AND HE WANTED TO GO AHEAD AND COME IN FOR HIS TESTOSTERONE SHOT    PLEASE CALL BACK TODAY   [phone]

## 2018-06-21 NOTE — TELEPHONE ENCOUNTER
PATIENT IS CALLING STATING DR. DE LEÓN CALLED PATIENT AND LEFT HIM A VOICEMAIL TO RETURN HER CALL. YOU CAN REACH HIM BACK -[phone]

## 2018-06-21 NOTE — TELEPHONE ENCOUNTER
Pt states his wife is a pharmacist and will be administering the injections.  Pt advised rx for testosterone and syringes will be sent into [facility]  Please send rx  Pt advised to call if any questions

## 2018-06-22 ENCOUNTER — TELEPHONE (OUTPATIENT)
Dept: ENDOCRINOLOGY | Facility: CLINIC | Age: 23
End: 2018-06-22

## 2018-06-22 RX ORDER — TESTOSTERONE ENANTHATE 200 MG/ML
100 INJECTION, SOLUTION INTRAMUSCULAR
Qty: 5 ML | Refills: 0 | Status: SHIPPED | OUTPATIENT
Start: 2018-06-22 | End: 2018-07-24

## 2018-06-22 NOTE — TELEPHONE ENCOUNTER
PATIENT IS CALLING STATING HIS INSURANCE IS NOT COVERING HIS TESTERONE MEDICATION. HE IS CALLING TO SEE WHAT WE CAN TO DO HELP GET MEDICATION COVERED OR TO CHANGE THE PRESCRIPTION TO SOMETHING THAT IS COVERED BY INSURANCE. YOU CAN REACH HIM BACK AT [phone]

## 2018-06-22 NOTE — TELEPHONE ENCOUNTER
leobardo would like to switch patients gages to   22 ania   1.5 inches.   They do not have the size you requested.

## 2018-06-22 NOTE — TELEPHONE ENCOUNTER
The patient was advised he needs to check with the pharmacy to ask if a PA is an option and if so to fax the information necessary for the PA.  Also advised the patient to call his insurance to inquire if the patches or gel would be covered.  Pt advised to call back if any questions

## 2018-06-26 ENCOUNTER — CLINICAL SUPPORT (OUTPATIENT)
Dept: INTERNAL MEDICINE | Facility: CLINIC | Age: 23
End: 2018-06-26

## 2018-06-26 ENCOUNTER — TELEPHONE (OUTPATIENT)
Dept: ENDOCRINOLOGY | Facility: CLINIC | Age: 23
End: 2018-06-26

## 2018-06-26 DIAGNOSIS — E29.1 HYPOGONADISM MALE: Primary | ICD-10-CM

## 2018-06-26 RX ORDER — TESTOSTERONE CYPIONATE 100 MG/ML
100 INJECTION, SOLUTION INTRAMUSCULAR ONCE
Status: COMPLETED | OUTPATIENT
Start: 2018-06-26 | End: 2018-07-10

## 2018-07-03 ENCOUNTER — TELEPHONE (OUTPATIENT)
Dept: ENDOCRINOLOGY | Facility: CLINIC | Age: 23
End: 2018-07-03

## 2018-07-03 NOTE — TELEPHONE ENCOUNTER
PATIENT RETURNED CALL AND DOES NOT THINK THAT THE MEDICATION WAS APPROVED. HE SAID HIS PHARMACY FAXED OVER A DENIAL TO US LAST Tuesday. PLEASE GIVE PT A CALL. THANKS.

## 2018-07-10 ENCOUNTER — CLINICAL SUPPORT (OUTPATIENT)
Dept: INTERNAL MEDICINE | Facility: CLINIC | Age: 23
End: 2018-07-10

## 2018-07-10 PROCEDURE — 96372 THER/PROPH/DIAG INJ SC/IM: CPT | Performed by: INTERNAL MEDICINE

## 2018-07-10 RX ADMIN — TESTOSTERONE CYPIONATE 100 MG: 100 INJECTION, SOLUTION INTRAMUSCULAR at 16:16

## 2018-07-13 ENCOUNTER — PRIOR AUTHORIZATION (OUTPATIENT)
Dept: ENDOCRINOLOGY | Facility: CLINIC | Age: 23
End: 2018-07-13

## 2018-07-13 NOTE — TELEPHONE ENCOUNTER
PA was sent to [facility] for testosterone enanthate 200mg/ml, however it was denied.  Paperwork including denial was given to Dr Diaz for recommendations

## 2018-07-24 ENCOUNTER — CLINICAL SUPPORT (OUTPATIENT)
Dept: INTERNAL MEDICINE | Facility: CLINIC | Age: 23
End: 2018-07-24

## 2018-07-24 DIAGNOSIS — E23.0 HYPOGONADOTROPIC HYPOGONADISM (HCC): Primary | ICD-10-CM

## 2018-07-24 PROCEDURE — 96372 THER/PROPH/DIAG INJ SC/IM: CPT | Performed by: INTERNAL MEDICINE

## 2018-07-24 RX ORDER — TESTOSTERONE CYPIONATE 200 MG/ML
200 INJECTION, SOLUTION INTRAMUSCULAR ONCE
Status: COMPLETED | OUTPATIENT
Start: 2018-07-24 | End: 2018-07-24

## 2018-07-24 RX ADMIN — TESTOSTERONE CYPIONATE 200 MG: 200 INJECTION, SOLUTION INTRAMUSCULAR at 13:39

## 2018-08-07 ENCOUNTER — CLINICAL SUPPORT (OUTPATIENT)
Dept: INTERNAL MEDICINE | Facility: CLINIC | Age: 23
End: 2018-08-07

## 2018-08-07 DIAGNOSIS — E29.1 HYPOGONADISM IN MALE: Primary | ICD-10-CM

## 2018-08-08 RX ORDER — TESTOSTERONE CYPIONATE 200 MG/ML
100 INJECTION, SOLUTION INTRAMUSCULAR ONCE
Status: COMPLETED | OUTPATIENT
Start: 2018-08-08 | End: 2018-08-21

## 2018-08-21 ENCOUNTER — CLINICAL SUPPORT (OUTPATIENT)
Dept: INTERNAL MEDICINE | Facility: CLINIC | Age: 23
End: 2018-08-21

## 2018-08-21 DIAGNOSIS — E23.0 PITUITARY HYPOGONADISM (HCC): ICD-10-CM

## 2018-08-21 PROCEDURE — 96372 THER/PROPH/DIAG INJ SC/IM: CPT | Performed by: INTERNAL MEDICINE

## 2018-08-21 RX ADMIN — TESTOSTERONE CYPIONATE 100 MG: 200 INJECTION, SOLUTION INTRAMUSCULAR at 16:00

## 2018-09-18 ENCOUNTER — TELEPHONE (OUTPATIENT)
Dept: INTERNAL MEDICINE | Facility: CLINIC | Age: 23
End: 2018-09-18

## 2018-09-18 DIAGNOSIS — E29.1 HYPOGONADISM IN MALE: Primary | ICD-10-CM

## 2018-09-18 NOTE — TELEPHONE ENCOUNTER
PT CAME IN FOR HIS TESTOSTERONE SHOT-HE DOESN'T HAVE ANY MORE REFILLS FOR IT HIS APPT IS NOT UNTIL December TO SEE YOU CAN WE REFILL UNTIL THEN OR DO YOU NEED TO SEE SOONER OR JUST DO LABS    PLEASE CALL [phone]

## 2018-09-18 NOTE — TELEPHONE ENCOUNTER
Last ov was 6-5-18 and next ov is 12-6-18  Last rx for testosterone was 6-5-18 for 5ml with 0 refills  He has been bringing the bottle here to receive the administration here  Please send refill to pharmacy

## 2018-09-20 RX ORDER — TESTOSTERONE ENANTHATE 200 MG/ML
100 INJECTION, SOLUTION INTRAMUSCULAR
Qty: 5 ML | Refills: 2 | Status: SHIPPED | OUTPATIENT
Start: 2018-09-20 | End: 2018-11-09 | Stop reason: SDUPTHER

## 2018-09-20 NOTE — TELEPHONE ENCOUNTER
Refill sent.  Labs ordered- will need to have these done with next injection- please let Salima know.  Thanks, [facility]

## 2018-10-02 ENCOUNTER — TELEPHONE (OUTPATIENT)
Dept: ENDOCRINOLOGY | Facility: CLINIC | Age: 23
End: 2018-10-02

## 2018-10-02 DIAGNOSIS — E23.0 HYPOGONADOTROPIC HYPOGONADISM (HCC): Primary | ICD-10-CM

## 2018-10-02 LAB
ALBUMIN SERPL-MCNC: 4.99 G/DL (ref 3.2–4.8)
ALBUMIN/GLOB SERPL: 1.9 G/DL (ref 1.5–2.5)
ALP SERPL-CCNC: 62 U/L (ref 25–100)
ALT SERPL W P-5'-P-CCNC: 21 U/L (ref 7–40)
ANION GAP SERPL CALCULATED.3IONS-SCNC: 3 MMOL/L (ref 3–11)
AST SERPL-CCNC: 30 U/L (ref 0–33)
BASOPHILS # BLD AUTO: 0.04 10*3/MM3 (ref 0–0.2)
BASOPHILS NFR BLD AUTO: 0.6 % (ref 0–1)
BILIRUB SERPL-MCNC: 1.6 MG/DL (ref 0.3–1.2)
BUN BLD-MCNC: 19 MG/DL (ref 9–23)
BUN/CREAT SERPL: 21.8 (ref 7–25)
CALCIUM SPEC-SCNC: 9.6 MG/DL (ref 8.7–10.4)
CHLORIDE SERPL-SCNC: 103 MMOL/L (ref 99–109)
CO2 SERPL-SCNC: 30 MMOL/L (ref 20–31)
CREAT BLD-MCNC: 0.87 MG/DL (ref 0.6–1.3)
DEPRECATED RDW RBC AUTO: 39.7 FL (ref 37–54)
EOSINOPHIL # BLD AUTO: 0.17 10*3/MM3 (ref 0–0.3)
EOSINOPHIL NFR BLD AUTO: 2.6 % (ref 0–3)
ERYTHROCYTE [DISTWIDTH] IN BLOOD BY AUTOMATED COUNT: 12.5 % (ref 11.3–14.5)
GFR SERPL CREATININE-BSD FRML MDRD: 107 ML/MIN/1.73
GLOBULIN UR ELPH-MCNC: 2.6 GM/DL
GLUCOSE BLD-MCNC: 71 MG/DL (ref 70–100)
HCT VFR BLD AUTO: 49.5 % (ref 38.9–50.9)
HGB BLD-MCNC: 16.9 G/DL (ref 13.1–17.5)
IMM GRANULOCYTES # BLD: 0.01 10*3/MM3 (ref 0–0.03)
IMM GRANULOCYTES NFR BLD: 0.2 % (ref 0–0.6)
LYMPHOCYTES # BLD AUTO: 2.69 10*3/MM3 (ref 0.6–4.8)
LYMPHOCYTES NFR BLD AUTO: 40.6 % (ref 24–44)
MCH RBC QN AUTO: 29.7 PG (ref 27–31)
MCHC RBC AUTO-ENTMCNC: 34.1 G/DL (ref 32–36)
MCV RBC AUTO: 87 FL (ref 80–99)
MONOCYTES # BLD AUTO: 0.63 10*3/MM3 (ref 0–1)
MONOCYTES NFR BLD AUTO: 9.5 % (ref 0–12)
NEUTROPHILS # BLD AUTO: 3.1 10*3/MM3 (ref 1.5–8.3)
NEUTROPHILS NFR BLD AUTO: 46.7 % (ref 41–71)
PLATELET # BLD AUTO: 193 10*3/MM3 (ref 150–450)
PMV BLD AUTO: 12.7 FL (ref 6–12)
POTASSIUM BLD-SCNC: 4.4 MMOL/L (ref 3.5–5.5)
PROT SERPL-MCNC: 7.6 G/DL (ref 5.7–8.2)
PSA SERPL-MCNC: 0.51 NG/ML (ref 0–4)
RBC # BLD AUTO: 5.69 10*6/MM3 (ref 4.2–5.76)
SODIUM BLD-SCNC: 136 MMOL/L (ref 132–146)
WBC NRBC COR # BLD: 6.63 10*3/MM3 (ref 3.5–10.8)

## 2018-10-02 PROCEDURE — G0103 PSA SCREENING: HCPCS | Performed by: INTERNAL MEDICINE

## 2018-10-02 PROCEDURE — 80053 COMPREHEN METABOLIC PANEL: CPT | Performed by: INTERNAL MEDICINE

## 2018-10-02 PROCEDURE — 85025 COMPLETE CBC W/AUTO DIFF WBC: CPT | Performed by: INTERNAL MEDICINE

## 2018-10-05 ENCOUNTER — TELEPHONE (OUTPATIENT)
Dept: INTERNAL MEDICINE | Facility: CLINIC | Age: 23
End: 2018-10-05

## 2018-10-30 ENCOUNTER — LAB (OUTPATIENT)
Dept: INTERNAL MEDICINE | Facility: CLINIC | Age: 23
End: 2018-10-30

## 2018-10-30 LAB — TESTOST SERPL-MCNC: 128.43 NG/DL (ref 123.06–813.86)

## 2018-10-30 PROCEDURE — 96372 THER/PROPH/DIAG INJ SC/IM: CPT | Performed by: INTERNAL MEDICINE

## 2018-10-30 PROCEDURE — 84403 ASSAY OF TOTAL TESTOSTERONE: CPT | Performed by: INTERNAL MEDICINE

## 2018-10-30 RX ADMIN — TESTOSTERONE CYPIONATE 100 MG: 100 INJECTION, SOLUTION INTRAMUSCULAR at 16:27

## 2018-11-03 DIAGNOSIS — F43.0 REACTION, SITUATIONAL, ACUTE, TO STRESS: ICD-10-CM

## 2018-11-04 DIAGNOSIS — R53.83 OTHER FATIGUE: ICD-10-CM

## 2018-11-04 DIAGNOSIS — R00.2 HEART PALPITATIONS: ICD-10-CM

## 2018-11-05 DIAGNOSIS — F43.0 REACTION, SITUATIONAL, ACUTE, TO STRESS: ICD-10-CM

## 2018-11-05 RX ORDER — FLUOXETINE 20 MG/1
TABLET, FILM COATED ORAL
Qty: 30 TABLET | Refills: 2 | OUTPATIENT
Start: 2018-11-05

## 2018-11-05 RX ORDER — ATENOLOL 25 MG/1
TABLET ORAL
Qty: 30 TABLET | Refills: 0 | Status: SHIPPED | OUTPATIENT
Start: 2018-11-05 | End: 2018-12-04 | Stop reason: SDUPTHER

## 2018-11-05 NOTE — TELEPHONE ENCOUNTER
PT HAS A SCHEDULED APPOINTMENT ON 11/16/2018 @ 2PM WITH DR BAEZ AND IS OUT OF HIS FLUOXETINE 20 MG MEDICATION AND WOULD LIKE TO GET THIS MEDICATION FILLED TIL IS THE APPOINTMENT. IF THERE IS ANY QUESTIONS OR CONCERNS THE PT MAY BE REACHED [phone]

## 2018-11-06 RX ORDER — FLUOXETINE 20 MG/1
20 TABLET, FILM COATED ORAL DAILY
Qty: 30 TABLET | Refills: 0 | Status: SHIPPED | OUTPATIENT
Start: 2018-11-06 | End: 2018-12-04 | Stop reason: SDUPTHER

## 2018-11-09 ENCOUNTER — TELEPHONE (OUTPATIENT)
Dept: ENDOCRINOLOGY | Facility: CLINIC | Age: 23
End: 2018-11-09

## 2018-11-09 RX ORDER — TESTOSTERONE ENANTHATE 200 MG/ML
150 INJECTION, SOLUTION INTRAMUSCULAR
Qty: 5 ML | Refills: 2
Start: 2018-11-09 | End: 2018-12-27 | Stop reason: SDUPTHER

## 2018-11-12 ENCOUNTER — TELEPHONE (OUTPATIENT)
Dept: INTERNAL MEDICINE | Facility: CLINIC | Age: 23
End: 2018-11-12

## 2018-11-13 ENCOUNTER — CLINICAL SUPPORT (OUTPATIENT)
Dept: INTERNAL MEDICINE | Facility: CLINIC | Age: 23
End: 2018-11-13

## 2018-11-13 DIAGNOSIS — E23.0 PITUITARY HYPOGONADISM (HCC): Primary | ICD-10-CM

## 2018-11-13 NOTE — TELEPHONE ENCOUNTER
Patient presented today for testosterone injection  [facility] recommendations were explained to him and he agreed to increase the dosage of testosterone injection to 150 mg q 2 weeks

## 2018-12-04 ENCOUNTER — OFFICE VISIT (OUTPATIENT)
Dept: INTERNAL MEDICINE | Facility: CLINIC | Age: 23
End: 2018-12-04

## 2018-12-04 VITALS
OXYGEN SATURATION: 99 % | WEIGHT: 174.8 LBS | HEART RATE: 55 BPM | SYSTOLIC BLOOD PRESSURE: 118 MMHG | BODY MASS INDEX: 25.81 KG/M2 | DIASTOLIC BLOOD PRESSURE: 60 MMHG

## 2018-12-04 DIAGNOSIS — F43.0 REACTION, SITUATIONAL, ACUTE, TO STRESS: ICD-10-CM

## 2018-12-04 DIAGNOSIS — R53.83 OTHER FATIGUE: ICD-10-CM

## 2018-12-04 DIAGNOSIS — K21.9 GASTROESOPHAGEAL REFLUX DISEASE WITHOUT ESOPHAGITIS: ICD-10-CM

## 2018-12-04 DIAGNOSIS — R00.2 HEART PALPITATIONS: ICD-10-CM

## 2018-12-04 PROCEDURE — 99213 OFFICE O/P EST LOW 20 MIN: CPT | Performed by: INTERNAL MEDICINE

## 2018-12-04 RX ORDER — OMEPRAZOLE 20 MG/1
20 CAPSULE, DELAYED RELEASE ORAL DAILY
Qty: 90 CAPSULE | Refills: 1 | Status: SHIPPED | OUTPATIENT
Start: 2018-12-04 | End: 2020-01-14 | Stop reason: SDUPTHER

## 2018-12-04 RX ORDER — FLUOXETINE 20 MG/1
20 TABLET, FILM COATED ORAL DAILY
Qty: 90 TABLET | Refills: 3 | Status: SHIPPED | OUTPATIENT
Start: 2018-12-04 | End: 2019-01-16 | Stop reason: CLARIF

## 2018-12-04 RX ORDER — ATENOLOL 25 MG/1
25 TABLET ORAL DAILY
Qty: 90 TABLET | Refills: 1 | Status: SHIPPED | OUTPATIENT
Start: 2018-12-04 | End: 2020-01-14 | Stop reason: SDUPTHER

## 2018-12-04 NOTE — PROGRESS NOTES
"Follow-up (doing well)    Subjective   Blade CASSIDY [facility] is a 23 y.o. male is here today for follow-up.    History of Present Illness   Blade is here for a follow up on his chronic stress.  He has been trying to get his testosterone levels regulated with dr. Diaz.  Low levels are making him more anxious.      Current Outpatient Medications:   •  atenolol (TENORMIN) 25 MG tablet, Take 1 tablet by mouth Daily., Disp: 90 tablet, Rfl: 1  •  FLUoxetine (PROzac) 20 MG tablet, Take 1 tablet by mouth Daily., Disp: 90 tablet, Rfl: 3  •  omeprazole (priLOSEC) 20 MG capsule, Take 1 capsule by mouth Daily., Disp: 90 capsule, Rfl: 1  •  Syringe/Needle, Disp, 21G X 1-1/2\" 3 ML misc, Use one q 2 weeks to administer testosterone injection, Disp: 12 each, Rfl: 0  •  Testosterone Enanthate 200 MG/ML solution, Inject 150 mg into the appropriate muscle as directed by prescriber Every 14 (Fourteen) Days. 100 mg is 0.5 ml, Disp: 5 mL, Rfl: 2      The following portions of the patient's history were reviewed and updated as appropriate: allergies, current medications, past family history, past medical history, past social history, past surgical history and problem list.    Review of Systems   Constitutional: Negative.  Negative for chills and fever.   HENT: Negative for ear discharge, ear pain, sinus pressure and sore throat.    Respiratory: Negative for cough, chest tightness and shortness of breath.    Cardiovascular: Positive for palpitations. Negative for chest pain and leg swelling.   Gastrointestinal: Negative for diarrhea, nausea and vomiting.   Musculoskeletal: Negative for arthralgias, back pain and myalgias.   Neurological: Negative for dizziness, syncope and headaches.   Psychiatric/Behavioral: Negative for confusion and sleep disturbance. The patient is nervous/anxious.        Objective   /60   Pulse 55   Wt 79.3 kg (174 lb 12.8 oz)   SpO2 99% Comment: ra  BMI 25.81 kg/m²   Physical Exam   Constitutional: He is " oriented to person, place, and time. He appears well-developed and well-nourished.   HENT:   Head: Normocephalic and atraumatic.   Mouth/Throat: No oropharyngeal exudate.   Eyes: Conjunctivae are normal. Pupils are equal, round, and reactive to light.   Neck: Neck supple. No thyromegaly present.   Cardiovascular: Normal rate and regular rhythm.   Pulmonary/Chest: Effort normal and breath sounds normal.   Abdominal: Soft. Bowel sounds are normal. He exhibits no distension. There is no tenderness.   Musculoskeletal: He exhibits no edema.   Neurological: He is alert and oriented to person, place, and time. No cranial nerve deficit.   Skin: Skin is warm and dry.   Psychiatric: He has a normal mood and affect. Judgment normal.   Nursing note and vitals reviewed.        Results for orders placed or performed in visit on 10/02/18   Testosterone   Result Value Ref Range    Testosterone, Total 128.43 123.06 - 813.86 ng/dL             Assessment/Plan   Diagnoses and all orders for this visit:    Other fatigue  -     atenolol (TENORMIN) 25 MG tablet; Take 1 tablet by mouth Daily.    Heart palpitations  -     atenolol (TENORMIN) 25 MG tablet; Take 1 tablet by mouth Daily.  -     Lipid Panel; Future  -     Vitamin D 25 Hydroxy; Future    Reaction, situational, acute, to stress  -     FLUoxetine (PROzac) 20 MG tablet; Take 1 tablet by mouth Daily.  -     TSH; Future  -     Comprehensive Metabolic Panel; Future    Gastroesophageal reflux disease without esophagitis  -     omeprazole (priLOSEC) 20 MG capsule; Take 1 capsule by mouth Daily.  -     Comprehensive Metabolic Panel; Future                 Return in about 1 year (around 12/4/2019) for Annual.

## 2018-12-06 ENCOUNTER — OFFICE VISIT (OUTPATIENT)
Dept: ENDOCRINOLOGY | Facility: CLINIC | Age: 23
End: 2018-12-06

## 2018-12-06 VITALS
BODY MASS INDEX: 25.18 KG/M2 | OXYGEN SATURATION: 99 % | WEIGHT: 170 LBS | SYSTOLIC BLOOD PRESSURE: 128 MMHG | HEART RATE: 60 BPM | DIASTOLIC BLOOD PRESSURE: 70 MMHG | HEIGHT: 69 IN

## 2018-12-06 DIAGNOSIS — R00.2 HEART PALPITATIONS: ICD-10-CM

## 2018-12-06 DIAGNOSIS — E23.0 HYPOGONADOTROPIC HYPOGONADISM (HCC): Primary | ICD-10-CM

## 2018-12-06 DIAGNOSIS — K21.9 GASTROESOPHAGEAL REFLUX DISEASE WITHOUT ESOPHAGITIS: ICD-10-CM

## 2018-12-06 DIAGNOSIS — F43.0 REACTION, SITUATIONAL, ACUTE, TO STRESS: ICD-10-CM

## 2018-12-06 DIAGNOSIS — E23.0 PITUITARY HYPOGONADISM (HCC): ICD-10-CM

## 2018-12-06 LAB
25(OH)D3 SERPL-MCNC: 42.4 NG/ML
ALBUMIN SERPL-MCNC: 4.83 G/DL (ref 3.2–4.8)
ALBUMIN/GLOB SERPL: 1.9 G/DL (ref 1.5–2.5)
ALP SERPL-CCNC: 64 U/L (ref 25–100)
ALT SERPL W P-5'-P-CCNC: 21 U/L (ref 7–40)
ANION GAP SERPL CALCULATED.3IONS-SCNC: 7 MMOL/L (ref 3–11)
ARTICHOKE IGE QN: 123 MG/DL (ref 0–130)
AST SERPL-CCNC: 25 U/L (ref 0–33)
BASOPHILS # BLD AUTO: 0.05 10*3/MM3 (ref 0–0.2)
BASOPHILS NFR BLD AUTO: 0.4 % (ref 0–1)
BILIRUB SERPL-MCNC: 1.4 MG/DL (ref 0.3–1.2)
BUN BLD-MCNC: 20 MG/DL (ref 9–23)
BUN/CREAT SERPL: 24.7 (ref 7–25)
CALCIUM SPEC-SCNC: 9.2 MG/DL (ref 8.7–10.4)
CHLORIDE SERPL-SCNC: 102 MMOL/L (ref 99–109)
CHOLEST SERPL-MCNC: 158 MG/DL (ref 0–200)
CO2 SERPL-SCNC: 29 MMOL/L (ref 20–31)
CREAT BLD-MCNC: 0.81 MG/DL (ref 0.6–1.3)
DEPRECATED RDW RBC AUTO: 39.2 FL (ref 37–54)
EOSINOPHIL # BLD AUTO: 0.22 10*3/MM3 (ref 0–0.3)
EOSINOPHIL NFR BLD AUTO: 2 % (ref 0–3)
ERYTHROCYTE [DISTWIDTH] IN BLOOD BY AUTOMATED COUNT: 12.5 % (ref 11.3–14.5)
GFR SERPL CREATININE-BSD FRML MDRD: 116 ML/MIN/1.73
GLOBULIN UR ELPH-MCNC: 2.6 GM/DL
GLUCOSE BLD-MCNC: 79 MG/DL (ref 70–100)
HCT VFR BLD AUTO: 46.7 % (ref 38.9–50.9)
HDLC SERPL-MCNC: 35 MG/DL (ref 40–60)
HGB BLD-MCNC: 15.8 G/DL (ref 13.1–17.5)
IMM GRANULOCYTES # BLD: 0.02 10*3/MM3 (ref 0–0.03)
IMM GRANULOCYTES NFR BLD: 0.2 % (ref 0–0.6)
LYMPHOCYTES # BLD AUTO: 4.45 10*3/MM3 (ref 0.6–4.8)
LYMPHOCYTES NFR BLD AUTO: 39.5 % (ref 24–44)
MCH RBC QN AUTO: 29.2 PG (ref 27–31)
MCHC RBC AUTO-ENTMCNC: 33.8 G/DL (ref 32–36)
MCV RBC AUTO: 86.3 FL (ref 80–99)
MONOCYTES # BLD AUTO: 0.7 10*3/MM3 (ref 0–1)
MONOCYTES NFR BLD AUTO: 6.2 % (ref 0–12)
NEUTROPHILS # BLD AUTO: 5.85 10*3/MM3 (ref 1.5–8.3)
NEUTROPHILS NFR BLD AUTO: 51.9 % (ref 41–71)
PLATELET # BLD AUTO: 209 10*3/MM3 (ref 150–450)
PMV BLD AUTO: 12.3 FL (ref 6–12)
POTASSIUM BLD-SCNC: 4.3 MMOL/L (ref 3.5–5.5)
PROT SERPL-MCNC: 7.4 G/DL (ref 5.7–8.2)
RBC # BLD AUTO: 5.41 10*6/MM3 (ref 4.2–5.76)
SODIUM BLD-SCNC: 138 MMOL/L (ref 132–146)
TESTOST SERPL-MCNC: 474.92 NG/DL (ref 123.06–813.86)
TRIGL SERPL-MCNC: 102 MG/DL (ref 0–150)
TSH SERPL DL<=0.05 MIU/L-ACNC: 1.37 MIU/ML (ref 0.35–5.35)
WBC NRBC COR # BLD: 11.27 10*3/MM3 (ref 3.5–10.8)

## 2018-12-06 PROCEDURE — 84443 ASSAY THYROID STIM HORMONE: CPT | Performed by: INTERNAL MEDICINE

## 2018-12-06 PROCEDURE — 99213 OFFICE O/P EST LOW 20 MIN: CPT | Performed by: INTERNAL MEDICINE

## 2018-12-06 PROCEDURE — 80053 COMPREHEN METABOLIC PANEL: CPT | Performed by: INTERNAL MEDICINE

## 2018-12-06 PROCEDURE — 85025 COMPLETE CBC W/AUTO DIFF WBC: CPT | Performed by: INTERNAL MEDICINE

## 2018-12-06 PROCEDURE — 84402 ASSAY OF FREE TESTOSTERONE: CPT | Performed by: INTERNAL MEDICINE

## 2018-12-06 PROCEDURE — 84403 ASSAY OF TOTAL TESTOSTERONE: CPT | Performed by: INTERNAL MEDICINE

## 2018-12-06 PROCEDURE — 80061 LIPID PANEL: CPT | Performed by: INTERNAL MEDICINE

## 2018-12-06 PROCEDURE — 82306 VITAMIN D 25 HYDROXY: CPT | Performed by: INTERNAL MEDICINE

## 2018-12-06 PROCEDURE — 84270 ASSAY OF SEX HORMONE GLOBUL: CPT | Performed by: INTERNAL MEDICINE

## 2018-12-06 NOTE — PROGRESS NOTES
"Blade CASSIDY [facility] 23 y.o.  CC:Follow-up and Pituitary Hypogonadism      [place]: Follow-up and Pituitary Hypogonadism  doing well on testosterone replacement  Discussed low FSH / LH - genetic causes of abnormal hormone function reviewed  He has secondary hypogonadism  Feels much better with HRT  No problems with shots  Recalled that he does have sister with pubertal delay- had to have hormones to grow normally and undergo puberty   No headaches  We did review need for monitoring of PSA yearly and cbc, testosterone levels  Effect of testosterone on fertility reviewed and we discussed alternative medications used to maintain /restore spermatogenesis     No Known Allergies    Current Outpatient Medications:   •  atenolol (TENORMIN) 25 MG tablet, Take 1 tablet by mouth Daily., Disp: 90 tablet, Rfl: 1  •  FLUoxetine (PROzac) 20 MG tablet, Take 1 tablet by mouth Daily., Disp: 90 tablet, Rfl: 3  •  omeprazole (priLOSEC) 20 MG capsule, Take 1 capsule by mouth Daily., Disp: 90 capsule, Rfl: 1  •  Testosterone Enanthate 200 MG/ML solution, Inject 150 mg into the appropriate muscle as directed by prescriber Every 14 (Fourteen) Days. 100 mg is 0.5 ml, Disp: 5 mL, Rfl: 2  •  Syringe/Needle, Disp, 21G X 1-1/2\" 3 ML misc, Use one q 2 weeks to administer testosterone injection, Disp: 12 each, Rfl: 0  Patient Active Problem List    Diagnosis   • Pituitary hypogonadism (CMS/HCC) [E23.0]   • Asthma [J45.909]   • GERD (gastroesophageal reflux disease) [K21.9]   • Panic attack as reaction to stress [F41.0, F43.0]   • Reaction, situational, acute, to stress [F43.0]   • Shortness of breath [R06.02]   • UTI (urinary tract infection) [N39.0]   • Benign paroxysmal positional vertigo [H81.10]   • Eustachian tube dysfunction [H69.80]     Review of Systems   Constitutional: Negative for activity change, appetite change, chills, diaphoresis, fatigue, fever and unexpected weight change.   HENT: Negative for congestion, dental problem, drooling, ear " discharge, ear pain, facial swelling, hearing loss, mouth sores, nosebleeds, postnasal drip, rhinorrhea, sinus pressure, sneezing, sore throat, tinnitus, trouble swallowing and voice change.    Eyes: Negative for photophobia, pain, discharge, redness, itching and visual disturbance.   Respiratory: Negative for apnea, cough, choking, chest tightness, shortness of breath, wheezing and stridor.    Cardiovascular: Negative for chest pain, palpitations and leg swelling.   Gastrointestinal: Negative for abdominal distention, abdominal pain, anal bleeding, blood in stool, constipation, diarrhea, nausea, rectal pain and vomiting.   Endocrine: Negative for cold intolerance, heat intolerance, polydipsia, polyphagia and polyuria.   Genitourinary: Negative for decreased urine volume, difficulty urinating, dysuria, enuresis, flank pain, frequency, genital sores, hematuria and urgency.   Musculoskeletal: Negative for arthralgias, back pain, gait problem, joint swelling, myalgias, neck pain and neck stiffness.   Skin: Negative for color change, pallor, rash and wound.   Allergic/Immunologic: Negative for environmental allergies, food allergies and immunocompromised state.   Neurological: Negative for dizziness, tremors, seizures, syncope, facial asymmetry, speech difficulty, weakness, light-headedness, numbness and headaches.   Hematological: Negative for adenopathy. Does not bruise/bleed easily.   Psychiatric/Behavioral: Negative for agitation, behavioral problems, confusion, decreased concentration, dysphoric mood, hallucinations, self-injury, sleep disturbance and suicidal ideas. The patient is not nervous/anxious and is not hyperactive.      Social History     Socioeconomic History   • Marital status:      Spouse name: Not on file   • Number of children: Not on file   • Years of education: Not on file   • Highest education level: Not on file   Social Needs   • Financial resource strain: Not on file   • Food insecurity -  "worry: Not on file   • Food insecurity - inability: Not on file   • Transportation needs - medical: Not on file   • Transportation needs - non-medical: Not on file   Occupational History   • Not on file   Tobacco Use   • Smoking status: Former Smoker     Last attempt to quit: 2016     Years since quittin.9   • Smokeless tobacco: Never Used   Substance and Sexual Activity   • Alcohol use: No   • Drug use: No   • Sexual activity: Defer   Other Topics Concern   • Not on file   Social History Narrative   • Not on file     No family history on file.  /70   Pulse 60   Ht 175.3 cm (69\")   Wt 77.1 kg (170 lb)   SpO2 99%   BMI 25.10 kg/m²   Physical Exam   Constitutional: He is oriented to person, place, and time. He appears well-developed and well-nourished.   HENT:   Head: Normocephalic and atraumatic.   Nose: Nose normal.   Mouth/Throat: Oropharynx is clear and moist.   Eyes: Conjunctivae, EOM and lids are normal. Pupils are equal, round, and reactive to light.   Neck: Trachea normal and normal range of motion. Neck supple. Carotid bruit is not present. No tracheal deviation present. No thyroid mass and no thyromegaly present.   Cardiovascular: Normal rate, regular rhythm, normal heart sounds and intact distal pulses. Exam reveals no gallop and no friction rub.   No murmur heard.  Pulmonary/Chest: Effort normal and breath sounds normal. No respiratory distress. He has no wheezes.   Musculoskeletal: Normal range of motion. He exhibits no edema or deformity.   Lymphadenopathy:     He has no cervical adenopathy.   Neurological: He is alert and oriented to person, place, and time. He has normal reflexes. He displays normal reflexes. No cranial nerve deficit.   Skin: Skin is warm and dry. No rash noted. No cyanosis or erythema. Nails show no clubbing.   Psychiatric: He has a normal mood and affect. His speech is normal and behavior is normal. Judgment and thought content normal. Cognition and memory are normal. "   Nursing note and vitals reviewed.    Results for orders placed or performed in visit on 10/02/18   Testosterone   Result Value Ref Range    Testosterone, Total 128.43 123.06 - 813.86 ng/dL     Problem List Items Addressed This Visit        Digestive    GERD (gastroesophageal reflux disease)     Stable symptoms currently - otc preparations            Endocrine    Pituitary hypogonadism (CMS/HCC)     Discussed management with him   Last shot 1 week ago- is getting shots every 2 weeks  Has had 2 shots at higher dose  Discussed with him  F/u 6 months             Other    Reaction, situational, acute, to stress      Other Visit Diagnoses     Hypogonadotropic hypogonadism (CMS/HCC)    -  Primary    Relevant Orders    CBC Auto Differential (Completed)    Testosterone (Completed)    Testosterone Free Direct    Sex Horm Binding Globulin    Heart palpitations            Return in about 6 months (around 6/6/2019).    Allyson Duenas MA  Signed Roxanna Diaz MD

## 2018-12-07 NOTE — ASSESSMENT & PLAN NOTE
Discussed management with him   Last shot 1 week ago- is getting shots every 2 weeks  Has had 2 shots at higher dose  Discussed with him  F/u 6 months

## 2018-12-08 LAB — SHBG SERPL-SCNC: 10.6 NMOL/L (ref 16.5–55.9)

## 2018-12-09 LAB — TESTOST FREE SERPL-MCNC: 18.4 PG/ML (ref 9.3–26.5)

## 2018-12-11 ENCOUNTER — CLINICAL SUPPORT (OUTPATIENT)
Dept: INTERNAL MEDICINE | Facility: CLINIC | Age: 23
End: 2018-12-11

## 2018-12-11 RX ORDER — TESTOSTERONE CYPIONATE 200 MG/ML
150 INJECTION, SOLUTION INTRAMUSCULAR
Status: CANCELLED | OUTPATIENT
Start: 2018-12-11

## 2018-12-26 ENCOUNTER — CLINICAL SUPPORT (OUTPATIENT)
Dept: INTERNAL MEDICINE | Facility: CLINIC | Age: 23
End: 2018-12-26

## 2018-12-26 DIAGNOSIS — E29.1 HYPOGONADISM IN MALE: Primary | ICD-10-CM

## 2018-12-26 PROCEDURE — 96372 THER/PROPH/DIAG INJ SC/IM: CPT | Performed by: INTERNAL MEDICINE

## 2018-12-26 RX ORDER — TESTOSTERONE CYPIONATE 200 MG/ML
75 INJECTION, SOLUTION INTRAMUSCULAR ONCE
Status: CANCELLED | OUTPATIENT
Start: 2018-12-26

## 2018-12-26 RX ORDER — TESTOSTERONE ENANTHATE 200 MG/ML
75 INJECTION, SOLUTION INTRAMUSCULAR ONCE
Status: CANCELLED | OUTPATIENT
Start: 2018-12-26

## 2018-12-27 RX ORDER — TESTOSTERONE ENANTHATE 200 MG/ML
150 INJECTION, SOLUTION INTRAMUSCULAR
Qty: 5 ML | Refills: 2
Start: 2018-12-27 | End: 2019-03-12 | Stop reason: SDUPTHER

## 2018-12-28 RX ORDER — TESTOSTERONE ENANTHATE 200 MG/ML
150 INJECTION, SOLUTION INTRAMUSCULAR
Status: DISCONTINUED | OUTPATIENT
Start: 2018-12-28 | End: 2020-08-17

## 2019-01-09 ENCOUNTER — CLINICAL SUPPORT (OUTPATIENT)
Dept: INTERNAL MEDICINE | Facility: CLINIC | Age: 24
End: 2019-01-09

## 2019-01-09 DIAGNOSIS — E23.0 PITUITARY HYPOGONADISM (HCC): ICD-10-CM

## 2019-01-09 PROCEDURE — 96372 THER/PROPH/DIAG INJ SC/IM: CPT | Performed by: INTERNAL MEDICINE

## 2019-01-09 RX ADMIN — TESTOSTERONE ENANTHATE 150 MG: 200 INJECTION, SOLUTION INTRAMUSCULAR at 15:50

## 2019-01-15 DIAGNOSIS — F43.0 REACTION, SITUATIONAL, ACUTE, TO STRESS: ICD-10-CM

## 2019-01-16 ENCOUNTER — TELEPHONE (OUTPATIENT)
Dept: INTERNAL MEDICINE | Facility: CLINIC | Age: 24
End: 2019-01-16

## 2019-01-16 RX ORDER — FLUOXETINE HYDROCHLORIDE 20 MG/1
20 CAPSULE ORAL DAILY
Qty: 90 CAPSULE | Refills: 1 | Status: SHIPPED | OUTPATIENT
Start: 2019-01-16 | End: 2019-07-23 | Stop reason: SDUPTHER

## 2019-01-16 RX ORDER — FLUOXETINE 20 MG/1
20 TABLET, FILM COATED ORAL DAILY
Qty: 90 TABLET | Refills: 3 | OUTPATIENT
Start: 2019-01-16

## 2019-01-17 NOTE — TELEPHONE ENCOUNTER
M for pt that this script was changed last night and pharmacy was asked to ca the script for the tablets so we don't run into this problem again.  Advised pt to call me back if he has any additional issues.

## 2019-01-17 NOTE — TELEPHONE ENCOUNTER
PATIENT STATES HIS PHARMACY HAS ASKED THAT HE CALL HIS PCP TO HAVE THIS RX CHANGED FROM TABLETS TO CAPSULES. HIS INSURANCE WILL NOT COVER THAT TABLET FORM OF THIS MEDICATION. PATIENT IS CURRENTLY OUT OF THIS MEDICATION AND WILL NEED TO BE ABLE TO PICK IT UP AS SOON AS POSSIBLE.

## 2019-01-23 ENCOUNTER — CLINICAL SUPPORT (OUTPATIENT)
Dept: INTERNAL MEDICINE | Facility: CLINIC | Age: 24
End: 2019-01-23

## 2019-01-23 DIAGNOSIS — E23.0 PITUITARY HYPOGONADISM (HCC): Primary | ICD-10-CM

## 2019-01-23 PROCEDURE — 96372 THER/PROPH/DIAG INJ SC/IM: CPT | Performed by: INTERNAL MEDICINE

## 2019-01-23 RX ADMIN — TESTOSTERONE ENANTHATE 150 MG: 200 INJECTION, SOLUTION INTRAMUSCULAR at 15:48

## 2019-01-23 NOTE — PROGRESS NOTES
Pt here today for Testosterone injection. He brings his own vial. 150 mg given in left dorsogluteal. No complications.

## 2019-02-06 ENCOUNTER — CLINICAL SUPPORT (OUTPATIENT)
Dept: INTERNAL MEDICINE | Facility: CLINIC | Age: 24
End: 2019-02-06

## 2019-02-06 DIAGNOSIS — E23.0 PITUITARY HYPOGONADISM (HCC): Primary | ICD-10-CM

## 2019-02-06 PROCEDURE — 96372 THER/PROPH/DIAG INJ SC/IM: CPT | Performed by: INTERNAL MEDICINE

## 2019-02-06 RX ADMIN — TESTOSTERONE ENANTHATE 150 MG: 200 INJECTION, SOLUTION INTRAMUSCULAR at 08:17

## 2019-02-06 RX ADMIN — TESTOSTERONE ENANTHATE 150 MG: 200 INJECTION, SOLUTION INTRAMUSCULAR at 16:10

## 2019-02-06 NOTE — PROGRESS NOTES
Pt here today for testosterone injection. He brought his own med. 0.75ml given to equal 150mg. Right dorsogluteal.

## 2019-02-20 ENCOUNTER — CLINICAL SUPPORT (OUTPATIENT)
Dept: INTERNAL MEDICINE | Facility: CLINIC | Age: 24
End: 2019-02-20

## 2019-02-20 DIAGNOSIS — E23.0 PITUITARY HYPOGONADISM (HCC): Primary | ICD-10-CM

## 2019-02-20 PROCEDURE — 96372 THER/PROPH/DIAG INJ SC/IM: CPT | Performed by: INTERNAL MEDICINE

## 2019-02-20 RX ADMIN — TESTOSTERONE ENANTHATE 150 MG: 200 INJECTION, SOLUTION INTRAMUSCULAR at 16:02

## 2019-02-26 ENCOUNTER — OFFICE VISIT (OUTPATIENT)
Dept: INTERNAL MEDICINE | Facility: CLINIC | Age: 24
End: 2019-02-26

## 2019-02-26 VITALS
TEMPERATURE: 98.2 F | SYSTOLIC BLOOD PRESSURE: 142 MMHG | WEIGHT: 169 LBS | HEIGHT: 69 IN | HEART RATE: 80 BPM | DIASTOLIC BLOOD PRESSURE: 72 MMHG | BODY MASS INDEX: 25.03 KG/M2 | OXYGEN SATURATION: 99 %

## 2019-02-26 DIAGNOSIS — J02.9 SORE THROAT: Primary | ICD-10-CM

## 2019-02-26 LAB
EXPIRATION DATE: NORMAL
HETEROPH AB SER QL LA: NEGATIVE
INTERNAL CONTROL: NORMAL
Lab: NORMAL
S PYO AG THROAT QL: NEGATIVE

## 2019-02-26 PROCEDURE — 86665 EPSTEIN-BARR CAPSID VCA: CPT | Performed by: PHYSICIAN ASSISTANT

## 2019-02-26 PROCEDURE — 86663 EPSTEIN-BARR ANTIBODY: CPT | Performed by: PHYSICIAN ASSISTANT

## 2019-02-26 PROCEDURE — 99212 OFFICE O/P EST SF 10 MIN: CPT | Performed by: PHYSICIAN ASSISTANT

## 2019-02-26 PROCEDURE — 86664 EPSTEIN-BARR NUCLEAR ANTIGEN: CPT | Performed by: PHYSICIAN ASSISTANT

## 2019-02-26 PROCEDURE — 87880 STREP A ASSAY W/OPTIC: CPT | Performed by: PHYSICIAN ASSISTANT

## 2019-02-26 PROCEDURE — 86308 HETEROPHILE ANTIBODY SCREEN: CPT | Performed by: PHYSICIAN ASSISTANT

## 2019-02-26 NOTE — PROGRESS NOTES
"Chief Complaint   Patient presents with   • Sore Throat       Subjective   Blade Hall is a 23 y.o. male.       History of Present Illness     Pt started feeling bad a couple weeks ago with dryness and soreness in the right side of his throat. His symptoms resolved for a few days and then returned. He noticed some white patches on the right side of his tonsils.  He had increased fatigue. No fever, no cough. Had some head congestion and post nasal drainage with dizziness a week or so ago. Restarted flonase and it helped.      Current Outpatient Medications:   •  atenolol (TENORMIN) 25 MG tablet, Take 1 tablet by mouth Daily., Disp: 90 tablet, Rfl: 1  •  FLUoxetine (PROzac) 20 MG capsule, Take 1 capsule by mouth Daily., Disp: 90 capsule, Rfl: 1  •  omeprazole (priLOSEC) 20 MG capsule, Take 1 capsule by mouth Daily., Disp: 90 capsule, Rfl: 1  •  Syringe/Needle, Disp, 21G X 1-1/2\" 3 ML misc, Use one q 2 weeks to administer testosterone injection, Disp: 12 each, Rfl: 0  •  Testosterone Enanthate 200 MG/ML solution, Inject 150 mg into the appropriate muscle as directed by prescriber Every 14 (Fourteen) Days. 100 mg is 0.5 ml, Disp: 5 mL, Rfl: 2    Current Facility-Administered Medications:   •  Testosterone Enanthate solution 150 mg, 150 mg, Intramuscular, Q14 Days, Roxanna Diaz MD, 150 mg at 02/20/19 1602     [facility]  The following portions of the patient's history were reviewed and updated as appropriate: allergies, current medications, past family history, past medical history, past social history, past surgical history and problem list.    Review of Systems   Constitutional: Positive for fatigue. Negative for activity change, fever and unexpected weight change.   HENT: Positive for congestion, postnasal drip and sore throat. Negative for ear pain.    Eyes: Negative for pain and discharge.   Respiratory: Negative for cough, chest tightness, shortness of breath and wheezing.    Cardiovascular: Negative for " "chest pain and palpitations.   Gastrointestinal: Negative for abdominal pain, diarrhea and vomiting.   Endocrine: Negative.    Genitourinary: Negative.    Musculoskeletal: Negative for joint swelling.   Skin: Negative for color change, rash and wound.   Allergic/Immunologic: Negative.    Neurological: Negative for seizures and syncope.   Psychiatric/Behavioral: Negative.        Objective   /72   Pulse 80   Temp 98.2 °F (36.8 °C)   Ht 175.3 cm (69\")   Wt 76.7 kg (169 lb)   SpO2 99%   BMI 24.96 kg/m²     Physical Exam   Constitutional: He appears well-developed and well-nourished.   HENT:   Head: Normocephalic.   Right Ear: Hearing, tympanic membrane, external ear and ear canal normal.   Left Ear: Hearing, tympanic membrane, external ear and ear canal normal.   Nose: Nose normal.   Mouth/Throat: Oropharynx is clear and moist.   Eyes: Conjunctivae are normal. Pupils are equal, round, and reactive to light.   Neck: Normal range of motion.   Cardiovascular: Normal rate, regular rhythm and normal heart sounds.   Pulmonary/Chest: Effort normal and breath sounds normal. He has no decreased breath sounds. He has no wheezes. He has no rhonchi. He has no rales.   Musculoskeletal: Normal range of motion.   Lymphadenopathy:     He has no cervical adenopathy.   Neurological: He is alert.   Skin: Skin is warm and dry.   Psychiatric: He has a normal mood and affect. His behavior is normal.   Nursing note and vitals reviewed.      Results for orders placed or performed in visit on 02/26/19   Mononucleosis Screen   Result Value Ref Range    Monospot Negative Negative   POCT rapid strep A   Result Value Ref Range    Rapid Strep A Screen Negative Negative, VALID, INVALID, Not Performed    Internal Control Passed Passed    Lot Number [id number]     Expiration Date 06/30/2020         ASSESSMENT/PLAN    Problem List Items Addressed This Visit     None      Visit Diagnoses     Sore throat    -  Primary    Strep test is " negative. Check labs for mono. Continue flonase, increase fluids, rest and use ibuprofen/tylenol prn.    Relevant Orders    POCT rapid strep A (Completed)    Mononucleosis Screen (Completed)    Rei-Barr Virus VCA Antibody Panel               Return if symptoms worsen or fail to improve.

## 2019-02-28 LAB
EBV EA IGG SER-ACNC: <9 U/ML (ref 0–8.9)
EBV NA IGG SER IA-ACNC: 369 U/ML (ref 0–17.9)
EBV VCA IGG SER-ACNC: 35.9 U/ML (ref 0–17.9)
EBV VCA IGM SER-ACNC: <36 U/ML (ref 0–35.9)
INTERPRETATION: ABNORMAL

## 2019-03-06 ENCOUNTER — CLINICAL SUPPORT (OUTPATIENT)
Dept: INTERNAL MEDICINE | Facility: CLINIC | Age: 24
End: 2019-03-06

## 2019-03-06 DIAGNOSIS — E23.0 PITUITARY HYPOGONADISM (HCC): Primary | ICD-10-CM

## 2019-03-06 PROCEDURE — 96372 THER/PROPH/DIAG INJ SC/IM: CPT | Performed by: INTERNAL MEDICINE

## 2019-03-06 RX ADMIN — TESTOSTERONE ENANTHATE 150 MG: 200 INJECTION, SOLUTION INTRAMUSCULAR at 15:55

## 2019-03-12 ENCOUNTER — TELEPHONE (OUTPATIENT)
Dept: ENDOCRINOLOGY | Facility: CLINIC | Age: 24
End: 2019-03-12

## 2019-03-12 ENCOUNTER — TELEPHONE (OUTPATIENT)
Dept: INTERNAL MEDICINE | Facility: CLINIC | Age: 24
End: 2019-03-12

## 2019-03-12 RX ORDER — TESTOSTERONE ENANTHATE 200 MG/ML
150 INJECTION, SOLUTION INTRAMUSCULAR
Qty: 5 ML | Refills: 2 | Status: SHIPPED | OUTPATIENT
Start: 2019-03-12 | End: 2019-12-09 | Stop reason: SDUPTHER

## 2019-03-12 NOTE — TELEPHONE ENCOUNTER
PATIENT STATED THAT DR DE LEÓN INCREASED DOSE OF TESTOSTERONE BUT PHARMACY HAS NOT RECEIVED THE NEW CHANGE   PLEASE CALL IN TH CORRECT DOSE TO THE PHARMACY AND CALL PATIENT HAS REQUESTED A RETURN CALL WHEN COMPLETE    [facility] [place]

## 2019-03-13 ENCOUNTER — PRIOR AUTHORIZATION (OUTPATIENT)
Dept: ENDOCRINOLOGY | Facility: CLINIC | Age: 24
End: 2019-03-13

## 2019-03-14 NOTE — TELEPHONE ENCOUNTER
Patient was advised the prescription was sent for the corrected dose however was sent back for a PA and that is pending at this time.  He states he has always paid out of pocket but will wait on the PA to see if its approved before he buys it this time

## 2019-03-20 ENCOUNTER — CLINICAL SUPPORT (OUTPATIENT)
Dept: INTERNAL MEDICINE | Facility: CLINIC | Age: 24
End: 2019-03-20

## 2019-03-20 DIAGNOSIS — E23.0 PITUITARY HYPOGONADISM (HCC): Primary | ICD-10-CM

## 2019-03-20 PROCEDURE — 96372 THER/PROPH/DIAG INJ SC/IM: CPT | Performed by: INTERNAL MEDICINE

## 2019-03-20 RX ADMIN — TESTOSTERONE ENANTHATE 150 MG: 200 INJECTION, SOLUTION INTRAMUSCULAR at 16:02

## 2019-03-21 NOTE — TELEPHONE ENCOUNTER
Rsponse from [facility] was incorrect billing information  Correct info was obtained from [facility] pharmacy and completed paper form was faxed to [facility]  Awaiting response

## 2019-04-03 ENCOUNTER — CLINICAL SUPPORT (OUTPATIENT)
Dept: INTERNAL MEDICINE | Facility: CLINIC | Age: 24
End: 2019-04-03

## 2019-04-03 DIAGNOSIS — E23.0 PITUITARY HYPOGONADISM (HCC): Primary | ICD-10-CM

## 2019-04-03 PROCEDURE — 96372 THER/PROPH/DIAG INJ SC/IM: CPT | Performed by: INTERNAL MEDICINE

## 2019-04-03 RX ADMIN — TESTOSTERONE ENANTHATE 150 MG: 200 INJECTION, SOLUTION INTRAMUSCULAR at 16:00

## 2019-04-05 ENCOUNTER — PRIOR AUTHORIZATION (OUTPATIENT)
Dept: INTERNAL MEDICINE | Facility: CLINIC | Age: 24
End: 2019-04-05

## 2019-04-17 ENCOUNTER — CLINICAL SUPPORT (OUTPATIENT)
Dept: INTERNAL MEDICINE | Facility: CLINIC | Age: 24
End: 2019-04-17

## 2019-04-17 DIAGNOSIS — E23.0 PITUITARY HYPOGONADISM (HCC): ICD-10-CM

## 2019-04-17 PROCEDURE — 96372 THER/PROPH/DIAG INJ SC/IM: CPT | Performed by: INTERNAL MEDICINE

## 2019-04-17 RX ADMIN — TESTOSTERONE ENANTHATE 150 MG: 200 INJECTION, SOLUTION INTRAMUSCULAR at 15:53

## 2019-05-15 ENCOUNTER — CLINICAL SUPPORT (OUTPATIENT)
Dept: INTERNAL MEDICINE | Facility: CLINIC | Age: 24
End: 2019-05-15

## 2019-05-15 DIAGNOSIS — E23.0 PITUITARY HYPOGONADISM (HCC): Primary | ICD-10-CM

## 2019-05-15 PROCEDURE — 96372 THER/PROPH/DIAG INJ SC/IM: CPT | Performed by: INTERNAL MEDICINE

## 2019-05-15 RX ADMIN — TESTOSTERONE ENANTHATE 150 MG: 200 INJECTION, SOLUTION INTRAMUSCULAR at 15:45

## 2019-05-26 ENCOUNTER — OFFICE VISIT (OUTPATIENT)
Dept: INTERNAL MEDICINE | Facility: CLINIC | Age: 24
End: 2019-05-26

## 2019-05-26 VITALS
SYSTOLIC BLOOD PRESSURE: 120 MMHG | HEART RATE: 65 BPM | HEIGHT: 69 IN | OXYGEN SATURATION: 98 % | WEIGHT: 169 LBS | TEMPERATURE: 98.2 F | BODY MASS INDEX: 25.03 KG/M2 | DIASTOLIC BLOOD PRESSURE: 68 MMHG

## 2019-05-26 DIAGNOSIS — M25.562 ACUTE PAIN OF LEFT KNEE: Primary | ICD-10-CM

## 2019-05-26 PROCEDURE — 99213 OFFICE O/P EST LOW 20 MIN: CPT | Performed by: NURSE PRACTITIONER

## 2019-05-26 NOTE — PROGRESS NOTES
"Chief Complaint   Patient presents with   • Knee Pain     Pt needs xray for PT       History of Present Illness  23 y.o.male presents for knee pain.  Left knee pain located mostly medial with dull pain popping sensation; onset flare last week; has been bothering him intermittently for over a year.    Remote soccer injury no new injury.  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  Has been doing physical therapy and recommended xray.    Review of Systems   Constitutional: Negative for chills and fever.   Musculoskeletal: Positive for arthralgias. Negative for gait problem and joint swelling.   Neurological: Negative for weakness and numbness.       [facility]  The following portions of the patient's history were reviewed and updated as appropriate: allergies, current medications, past family history, past medical history, past social history, past surgical history and problem list.       Past Medical History:   Diagnosis Date   • Asthma    • GERD (gastroesophageal reflux disease)    • Low testosterone in male       Past Surgical History:   Procedure Laterality Date   • APPENDECTOMY        No Known Allergies   History reviewed. No pertinent family history.         Current Outpatient Medications:   •  atenolol (TENORMIN) 25 MG tablet, Take 1 tablet by mouth Daily., Disp: 90 tablet, Rfl: 1  •  FLUoxetine (PROzac) 20 MG capsule, Take 1 capsule by mouth Daily., Disp: 90 capsule, Rfl: 1  •  omeprazole (priLOSEC) 20 MG capsule, Take 1 capsule by mouth Daily., Disp: 90 capsule, Rfl: 1  •  Syringe/Needle, Disp, 21G X 1-1/2\" 3 ML misc, Use one q 2 weeks to administer testosterone injection, Disp: 12 each, Rfl: 0  •  Testosterone Enanthate 200 MG/ML solution, Inject 150 mg into the appropriate muscle as directed by prescriber Every 14 (Fourteen) Days. 100 mg is 0.5 ml, Disp: 5 mL, Rfl: 2    Current Facility-Administered Medications:   •  Testosterone Enanthate solution 150 mg, 150 mg, Intramuscular, Q14 Days, " "Roxanna Diaz MD, 150 mg at 05/15/19 1545    VITALS:  /68   Pulse 65   Temp 98.2 °F (36.8 °C)   Ht 175.3 cm (69\")   Wt 76.7 kg (169 lb)   SpO2 98%   BMI 24.96 kg/m²     Physical Exam   Constitutional: He appears well-developed and well-nourished. No distress.   Pulmonary/Chest: Effort normal.   Musculoskeletal: Normal range of motion.        Left knee: He exhibits normal range of motion, no swelling, no effusion, no deformity, no erythema, normal alignment, no LCL laxity, normal patellar mobility, no bony tenderness, normal meniscus and no MCL laxity. Tenderness found. Medial joint line tenderness noted.   Normal ROM all major joints.   Neurological: He is alert.   Vitals reviewed.      LABS  No new labs    ASSESSMENT/PLAN  Ameen was seen today for knee pain.    Diagnoses and all orders for this visit:    Acute pain of left knee  -     XR Knee 1 or 2 View Left; Future    acute on chronic knee pain.  Check xray  Does not need new order for PT said he already has this.  Recommended ice to knee intermittent  Oral Nsaids such as ibuprofen.  If worsening of symptoms or no improvement in symptoms  patient should contact our office for further evaluation treatment with pcp.    I discussed the patients findings and my recommendations with patient.  Patient was encouraged to keep me informed of any acute changes, lack of improvement, or any new concerning symptoms.    Patient voiced understanding of all instructions and denied further questions.      FOLLOW-UP  Return if symptoms worsen or fail to improve.    Electronically signed by:    BECCA Meneses  05/26/2019      "

## 2019-05-29 ENCOUNTER — CLINICAL SUPPORT (OUTPATIENT)
Dept: INTERNAL MEDICINE | Facility: CLINIC | Age: 24
End: 2019-05-29

## 2019-05-29 DIAGNOSIS — E23.0 PITUITARY HYPOGONADISM (HCC): ICD-10-CM

## 2019-05-29 PROCEDURE — 96372 THER/PROPH/DIAG INJ SC/IM: CPT | Performed by: INTERNAL MEDICINE

## 2019-05-29 RX ADMIN — TESTOSTERONE ENANTHATE 150 MG: 200 INJECTION, SOLUTION INTRAMUSCULAR at 16:10

## 2019-05-31 ENCOUNTER — HOSPITAL ENCOUNTER (OUTPATIENT)
Dept: GENERAL RADIOLOGY | Facility: HOSPITAL | Age: 24
Discharge: HOME | End: 2019-05-31
Admitting: NURSE PRACTITIONER

## 2019-05-31 DIAGNOSIS — M25.562 ACUTE PAIN OF LEFT KNEE: ICD-10-CM

## 2019-05-31 PROCEDURE — 73560 X-RAY EXAM OF KNEE 1 OR 2: CPT

## 2019-06-03 ENCOUNTER — TELEPHONE (OUTPATIENT)
Dept: INTERNAL MEDICINE | Facility: CLINIC | Age: 24
End: 2019-06-03

## 2019-06-04 DIAGNOSIS — M25.562 ACUTE PAIN OF LEFT KNEE: Primary | ICD-10-CM

## 2019-06-04 NOTE — TELEPHONE ENCOUNTER
I did letter for pt to give to his physical therapist that knee xray was negative.  I also placed official PT order for his file.

## 2019-06-12 ENCOUNTER — CLINICAL SUPPORT (OUTPATIENT)
Dept: INTERNAL MEDICINE | Facility: CLINIC | Age: 24
End: 2019-06-12

## 2019-06-12 DIAGNOSIS — E23.0 PITUITARY HYPOGONADISM (HCC): ICD-10-CM

## 2019-06-12 PROCEDURE — 96372 THER/PROPH/DIAG INJ SC/IM: CPT | Performed by: INTERNAL MEDICINE

## 2019-06-12 RX ADMIN — TESTOSTERONE ENANTHATE 150 MG: 200 INJECTION, SOLUTION INTRAMUSCULAR at 16:10

## 2019-06-26 ENCOUNTER — CLINICAL SUPPORT (OUTPATIENT)
Dept: INTERNAL MEDICINE | Facility: CLINIC | Age: 24
End: 2019-06-26

## 2019-06-26 DIAGNOSIS — E23.0 PITUITARY HYPOGONADISM (HCC): ICD-10-CM

## 2019-06-26 PROCEDURE — 96372 THER/PROPH/DIAG INJ SC/IM: CPT | Performed by: INTERNAL MEDICINE

## 2019-06-26 RX ADMIN — TESTOSTERONE ENANTHATE 150 MG: 200 INJECTION, SOLUTION INTRAMUSCULAR at 15:39

## 2019-07-06 ENCOUNTER — OFFICE VISIT (OUTPATIENT)
Dept: INTERNAL MEDICINE | Facility: CLINIC | Age: 24
End: 2019-07-06

## 2019-07-06 VITALS
BODY MASS INDEX: 24.88 KG/M2 | OXYGEN SATURATION: 97 % | WEIGHT: 168 LBS | DIASTOLIC BLOOD PRESSURE: 58 MMHG | SYSTOLIC BLOOD PRESSURE: 112 MMHG | TEMPERATURE: 98.2 F | HEIGHT: 69 IN | HEART RATE: 63 BPM

## 2019-07-06 DIAGNOSIS — H00.014 HORDEOLUM EXTERNUM OF LEFT UPPER EYELID: Primary | ICD-10-CM

## 2019-07-06 PROCEDURE — 99213 OFFICE O/P EST LOW 20 MIN: CPT | Performed by: NURSE PRACTITIONER

## 2019-07-06 PROCEDURE — 96372 THER/PROPH/DIAG INJ SC/IM: CPT | Performed by: NURSE PRACTITIONER

## 2019-07-06 RX ORDER — ERYTHROMYCIN 5 MG/G
OINTMENT OPHTHALMIC EVERY 6 HOURS
Qty: 2 EACH | Refills: 0 | Status: SHIPPED | OUTPATIENT
Start: 2019-07-06 | End: 2019-07-16

## 2019-07-06 RX ORDER — DEXAMETHASONE SODIUM PHOSPHATE 4 MG/ML
4 INJECTION, SOLUTION INTRA-ARTICULAR; INTRALESIONAL; INTRAMUSCULAR; INTRAVENOUS; SOFT TISSUE ONCE
Status: COMPLETED | OUTPATIENT
Start: 2019-07-06 | End: 2019-07-06

## 2019-07-06 RX ORDER — ERYTHROMYCIN 5 MG/G
OINTMENT OPHTHALMIC EVERY 6 HOURS
Qty: 2 EACH | Refills: 0 | Status: SHIPPED | OUTPATIENT
Start: 2019-07-06 | End: 2019-07-06

## 2019-07-06 RX ADMIN — DEXAMETHASONE SODIUM PHOSPHATE 4 MG: 4 INJECTION, SOLUTION INTRA-ARTICULAR; INTRALESIONAL; INTRAMUSCULAR; INTRAVENOUS; SOFT TISSUE at 12:54

## 2019-07-06 NOTE — PROGRESS NOTES
"Chief Complaint   Patient presents with   • Eye Pain     left eye, swollen, red x1 day       History of Present Illness    Blade Hall is a 24 y.o. male who presents today for left eye pain, redness, swelling x1 day.  Reports he was outside mowing grass yesterday and felt irritation of his left eye.  Noticed a small bump on the inner lid last night.  States that it is much worse this morning with increased swelling and pain.  Feels as though bump is rubbing eye when he blinks.      [facility]    The following portions of the patient's history were reviewed and updated as appropriate: allergies, current medications, past family history, past medical history, past social history, past surgical history and problem list.     Social History     Tobacco Use   • Smoking status: Former Smoker     Last attempt to quit: 2016     Years since quitting: 3.5   • Smokeless tobacco: Never Used   Substance Use Topics   • Alcohol use: No   • Drug use: No       Past Medical History:   Diagnosis Date   • Asthma    • GERD (gastroesophageal reflux disease)    • Low testosterone in male        Past Surgical History:   Procedure Laterality Date   • APPENDECTOMY         History reviewed. No pertinent family history.    No Known Allergies      Current Outpatient Medications:   •  atenolol (TENORMIN) 25 MG tablet, Take 1 tablet by mouth Daily., Disp: 90 tablet, Rfl: 1  •  FLUoxetine (PROzac) 20 MG capsule, Take 1 capsule by mouth Daily., Disp: 90 capsule, Rfl: 1  •  omeprazole (priLOSEC) 20 MG capsule, Take 1 capsule by mouth Daily., Disp: 90 capsule, Rfl: 1  •  Syringe/Needle, Disp, 21G X 1-1/2\" 3 ML misc, Use one q 2 weeks to administer testosterone injection, Disp: 12 each, Rfl: 0  •  Testosterone Enanthate 200 MG/ML solution, Inject 150 mg into the appropriate muscle as directed by prescriber Every 14 (Fourteen) Days. 100 mg is 0.5 ml, Disp: 5 mL, Rfl: 2  •  erythromycin (ROMYCIN) 5 MG/GM ophthalmic ointment, Administer  into the left eye " "Every 6 (Six) Hours for 10 days., Disp: 2 each, Rfl: 0    Current Facility-Administered Medications:   •  Testosterone Enanthate solution 150 mg, 150 mg, Intramuscular, Q14 Days, Roxanna Diaz MD, 150 mg at 06/26/19 1539    Review of Systems  Review of Systems   HENT: Negative for congestion, ear discharge, ear pain, facial swelling, postnasal drip, rhinorrhea, sinus pressure, sinus pain, sneezing, sore throat and trouble swallowing.    Eyes: Positive for pain and redness. Negative for discharge, itching and visual disturbance.   Respiratory: Negative for chest tightness, shortness of breath and wheezing.    Cardiovascular: Negative for chest pain and palpitations.   Skin: Positive for color change (left upp eyelid redness). Negative for rash and wound.   Neurological: Negative for dizziness, light-headedness and headaches.   Psychiatric/Behavioral: Negative for sleep disturbance.       Vitals:  Vitals:    07/06/19 0942   BP: 112/58   Pulse: 63   Temp: 98.2 °F (36.8 °C)   TempSrc: Oral   SpO2: 97%   Weight: 76.2 kg (168 lb)   Height: 175.3 cm (69.02\")       Physical Exam  Physical Exam   Constitutional: Vital signs are normal. He appears well-developed and well-nourished.   Eyes: Conjunctivae and EOM are normal. Pupils are equal, round, and reactive to light. Left eye exhibits hordeolum. Left conjunctiva is not injected.   Significant left upper eyelid swelling, tenderness to palpation.    Cardiovascular: Normal rate, regular rhythm and normal heart sounds.   Pulmonary/Chest: Effort normal and breath sounds normal.   Psychiatric: He has a normal mood and affect. His behavior is normal.       Labs  None this visit    Assessment/Plan  Ameen was seen today for eye pain.    Diagnoses and all orders for this visit:    Hordeolum externum of left upper eyelid  -     Discontinue: erythromycin (ROMYCIN) 5 MG/GM ophthalmic ointment; Administer  into the left eye Every 6 (Six) Hours for 10 days.  -     erythromycin " (ROMYCIN) 5 MG/GM ophthalmic ointment; Administer  into the left eye Every 6 (Six) Hours for 10 days.  -     dexamethasone (DECADRON) injection 4 mg    Advised patient to begin antibiotic therapy as prescribed.  Lid scrubbing with antibacterial soap and water, and warm compresses 3 times daily.  Follow-up with ophthalmology if no improvement or should vision changes occur.      Plan of care reviewed with patient at conclusion of today's visit. Patient education was provided regarding diagnosis, management, and prescribed or recommended OTC medications. Patient was informed to notify office of any new, worsening, or persistent symptoms. Patient verbalized understanding and agreement with plan of care.     Follow-Up  Return if symptoms worsen or fail to improve.    Electronically Signed By:  BECCA Westfall

## 2019-07-10 ENCOUNTER — CLINICAL SUPPORT (OUTPATIENT)
Dept: INTERNAL MEDICINE | Facility: CLINIC | Age: 24
End: 2019-07-10

## 2019-07-10 DIAGNOSIS — E23.0 PITUITARY HYPOGONADISM (HCC): ICD-10-CM

## 2019-07-10 PROCEDURE — 96372 THER/PROPH/DIAG INJ SC/IM: CPT | Performed by: INTERNAL MEDICINE

## 2019-07-10 RX ADMIN — TESTOSTERONE ENANTHATE 150 MG: 200 INJECTION, SOLUTION INTRAMUSCULAR at 15:48

## 2019-07-24 RX ORDER — FLUOXETINE HYDROCHLORIDE 20 MG/1
CAPSULE ORAL
Qty: 90 CAPSULE | Refills: 0 | Status: SHIPPED | OUTPATIENT
Start: 2019-07-24 | End: 2019-10-29 | Stop reason: SDUPTHER

## 2019-07-25 ENCOUNTER — CLINICAL SUPPORT (OUTPATIENT)
Dept: INTERNAL MEDICINE | Facility: CLINIC | Age: 24
End: 2019-07-25

## 2019-07-25 DIAGNOSIS — E23.0 PITUITARY HYPOGONADISM (HCC): Primary | ICD-10-CM

## 2019-07-25 PROCEDURE — 96372 THER/PROPH/DIAG INJ SC/IM: CPT | Performed by: INTERNAL MEDICINE

## 2019-07-25 RX ADMIN — TESTOSTERONE ENANTHATE 150 MG: 200 INJECTION, SOLUTION INTRAMUSCULAR at 16:20

## 2019-08-08 ENCOUNTER — TELEPHONE (OUTPATIENT)
Dept: INTERNAL MEDICINE | Facility: CLINIC | Age: 24
End: 2019-08-08

## 2019-08-08 ENCOUNTER — CLINICAL SUPPORT (OUTPATIENT)
Dept: INTERNAL MEDICINE | Facility: CLINIC | Age: 24
End: 2019-08-08

## 2019-08-08 DIAGNOSIS — E23.0 PITUITARY HYPOGONADISM (HCC): ICD-10-CM

## 2019-08-08 PROCEDURE — 96372 THER/PROPH/DIAG INJ SC/IM: CPT | Performed by: INTERNAL MEDICINE

## 2019-08-08 RX ADMIN — TESTOSTERONE ENANTHATE 150 MG: 200 INJECTION, SOLUTION INTRAMUSCULAR at 15:58

## 2019-08-22 ENCOUNTER — CLINICAL SUPPORT (OUTPATIENT)
Dept: INTERNAL MEDICINE | Facility: CLINIC | Age: 24
End: 2019-08-22

## 2019-08-22 DIAGNOSIS — E23.0 PITUITARY HYPOGONADISM (HCC): Primary | ICD-10-CM

## 2019-08-22 PROCEDURE — 96372 THER/PROPH/DIAG INJ SC/IM: CPT | Performed by: INTERNAL MEDICINE

## 2019-08-22 RX ADMIN — TESTOSTERONE ENANTHATE 150 MG: 200 INJECTION, SOLUTION INTRAMUSCULAR at 16:07

## 2019-09-05 ENCOUNTER — CLINICAL SUPPORT (OUTPATIENT)
Dept: INTERNAL MEDICINE | Facility: CLINIC | Age: 24
End: 2019-09-05

## 2019-09-05 RX ADMIN — TESTOSTERONE ENANTHATE 150 MG: 200 INJECTION, SOLUTION INTRAMUSCULAR at 15:58

## 2019-10-03 ENCOUNTER — CLINICAL SUPPORT (OUTPATIENT)
Dept: INTERNAL MEDICINE | Facility: CLINIC | Age: 24
End: 2019-10-03

## 2019-10-03 DIAGNOSIS — E23.0 PITUITARY HYPOGONADISM (HCC): ICD-10-CM

## 2019-10-03 PROCEDURE — 96372 THER/PROPH/DIAG INJ SC/IM: CPT | Performed by: INTERNAL MEDICINE

## 2019-10-03 RX ADMIN — TESTOSTERONE ENANTHATE 150 MG: 200 INJECTION, SOLUTION INTRAMUSCULAR at 15:55

## 2019-10-17 ENCOUNTER — CLINICAL SUPPORT (OUTPATIENT)
Dept: INTERNAL MEDICINE | Facility: CLINIC | Age: 24
End: 2019-10-17

## 2019-10-17 DIAGNOSIS — E23.0 PITUITARY HYPOGONADISM (HCC): ICD-10-CM

## 2019-10-17 PROCEDURE — 96372 THER/PROPH/DIAG INJ SC/IM: CPT | Performed by: INTERNAL MEDICINE

## 2019-10-17 RX ADMIN — TESTOSTERONE ENANTHATE 150 MG: 200 INJECTION, SOLUTION INTRAMUSCULAR at 15:48

## 2019-10-28 ENCOUNTER — OFFICE VISIT (OUTPATIENT)
Dept: ENDOCRINOLOGY | Facility: CLINIC | Age: 24
End: 2019-10-28

## 2019-10-28 VITALS
HEIGHT: 70 IN | HEART RATE: 71 BPM | OXYGEN SATURATION: 99 % | BODY MASS INDEX: 22.71 KG/M2 | DIASTOLIC BLOOD PRESSURE: 62 MMHG | SYSTOLIC BLOOD PRESSURE: 112 MMHG | WEIGHT: 158.6 LBS

## 2019-10-28 DIAGNOSIS — E23.0 PITUITARY HYPOGONADISM (HCC): Primary | ICD-10-CM

## 2019-10-28 DIAGNOSIS — M25.562 CHRONIC PAIN OF LEFT KNEE: ICD-10-CM

## 2019-10-28 DIAGNOSIS — G89.29 CHRONIC PAIN OF LEFT KNEE: ICD-10-CM

## 2019-10-28 LAB
ALBUMIN SERPL-MCNC: 4.4 G/DL (ref 3.5–5.2)
ALBUMIN/GLOB SERPL: 1.3 G/DL
ALP SERPL-CCNC: 50 U/L (ref 39–117)
ALT SERPL W P-5'-P-CCNC: 26 U/L (ref 1–41)
ANION GAP SERPL CALCULATED.3IONS-SCNC: 5.9 MMOL/L (ref 5–15)
AST SERPL-CCNC: 27 U/L (ref 1–40)
BASOPHILS # BLD AUTO: 0.1 10*3/MM3 (ref 0–0.2)
BASOPHILS NFR BLD AUTO: 1.3 % (ref 0–1.5)
BILIRUB SERPL-MCNC: 1.1 MG/DL (ref 0.2–1.2)
BUN BLD-MCNC: 13 MG/DL (ref 6–20)
BUN/CREAT SERPL: 15.5 (ref 7–25)
CALCIUM SPEC-SCNC: 9.4 MG/DL (ref 8.6–10.5)
CHLORIDE SERPL-SCNC: 98 MMOL/L (ref 98–107)
CO2 SERPL-SCNC: 33.1 MMOL/L (ref 22–29)
CREAT BLD-MCNC: 0.84 MG/DL (ref 0.76–1.27)
DEPRECATED RDW RBC AUTO: 40.8 FL (ref 37–54)
EOSINOPHIL # BLD AUTO: 0.86 10*3/MM3 (ref 0–0.4)
EOSINOPHIL NFR BLD AUTO: 11.1 % (ref 0.3–6.2)
ERYTHROCYTE [DISTWIDTH] IN BLOOD BY AUTOMATED COUNT: 12.1 % (ref 12.3–15.4)
GFR SERPL CREATININE-BSD FRML MDRD: 110 ML/MIN/1.73
GLOBULIN UR ELPH-MCNC: 3.4 GM/DL
GLUCOSE BLD-MCNC: 92 MG/DL (ref 65–99)
HCT VFR BLD AUTO: 50.2 % (ref 37.5–51)
HGB BLD-MCNC: 16.4 G/DL (ref 13–17.7)
IMM GRANULOCYTES # BLD AUTO: 0.01 10*3/MM3 (ref 0–0.05)
IMM GRANULOCYTES NFR BLD AUTO: 0.1 % (ref 0–0.5)
LYMPHOCYTES # BLD AUTO: 2.25 10*3/MM3 (ref 0.7–3.1)
LYMPHOCYTES NFR BLD AUTO: 29.1 % (ref 19.6–45.3)
MCH RBC QN AUTO: 29.9 PG (ref 26.6–33)
MCHC RBC AUTO-ENTMCNC: 32.7 G/DL (ref 31.5–35.7)
MCV RBC AUTO: 91.6 FL (ref 79–97)
MONOCYTES # BLD AUTO: 0.49 10*3/MM3 (ref 0.1–0.9)
MONOCYTES NFR BLD AUTO: 6.3 % (ref 5–12)
NEUTROPHILS # BLD AUTO: 4.03 10*3/MM3 (ref 1.7–7)
NEUTROPHILS NFR BLD AUTO: 52.1 % (ref 42.7–76)
NRBC BLD AUTO-RTO: 0 /100 WBC (ref 0–0.2)
PLATELET # BLD AUTO: 177 10*3/MM3 (ref 140–450)
PMV BLD AUTO: 12.3 FL (ref 6–12)
POTASSIUM BLD-SCNC: 4.1 MMOL/L (ref 3.5–5.2)
PROT SERPL-MCNC: 7.8 G/DL (ref 6–8.5)
RBC # BLD AUTO: 5.48 10*6/MM3 (ref 4.14–5.8)
SODIUM BLD-SCNC: 137 MMOL/L (ref 136–145)
TESTOST SERPL-MCNC: 453 NG/DL (ref 249–836)
TSH SERPL DL<=0.05 MIU/L-ACNC: 1.07 UIU/ML (ref 0.27–4.2)
WBC NRBC COR # BLD: 7.74 10*3/MM3 (ref 3.4–10.8)

## 2019-10-28 PROCEDURE — 85025 COMPLETE CBC W/AUTO DIFF WBC: CPT | Performed by: INTERNAL MEDICINE

## 2019-10-28 PROCEDURE — 80053 COMPREHEN METABOLIC PANEL: CPT | Performed by: INTERNAL MEDICINE

## 2019-10-28 PROCEDURE — 99213 OFFICE O/P EST LOW 20 MIN: CPT | Performed by: INTERNAL MEDICINE

## 2019-10-28 PROCEDURE — 84443 ASSAY THYROID STIM HORMONE: CPT | Performed by: INTERNAL MEDICINE

## 2019-10-28 PROCEDURE — 84402 ASSAY OF FREE TESTOSTERONE: CPT | Performed by: INTERNAL MEDICINE

## 2019-10-28 PROCEDURE — 84403 ASSAY OF TOTAL TESTOSTERONE: CPT | Performed by: INTERNAL MEDICINE

## 2019-10-28 NOTE — ASSESSMENT & PLAN NOTE
Check cbc, test/total and free  Waning effect about a week after shot  Is at [facility] currently   Adjust dosing prn   Discussed infertility with testosterone replacement and options to restore fertility in the future if he wishes

## 2019-10-28 NOTE — ASSESSMENT & PLAN NOTE
Refer ortho- has had injury, normal plain films, popping and pain with ambulation  No help thus far with therapy

## 2019-10-28 NOTE — PROGRESS NOTES
"Blade CASSIDY [facility] 24 y.o.  CC:Follow-up and pituitary hypogonadism      [place]:Follow-up and pituitary hypogonadism  is on 150 mg every 2 weeks  Noticing decline in effect prior to next shot especially fatigue   We did discuss infertility assoc with hrt and options to restore fertility in the event that he would like to have children   He states understanding- is a college student and is not interested in having a family currently  Also new c/o left knee pain - h/o soccer injury 10 years ago, now constant pain with popping when he moves the joint  He has had xrays (plain films) and is in therapy now without benefit     No Known Allergies    Current Outpatient Medications:   •  atenolol (TENORMIN) 25 MG tablet, Take 1 tablet by mouth Daily., Disp: 90 tablet, Rfl: 1  •  FLUoxetine (PROzac) 20 MG capsule, TAKE ONE CAPSULE BY MOUTH DAILY, Disp: 90 capsule, Rfl: 0  •  omeprazole (priLOSEC) 20 MG capsule, Take 1 capsule by mouth Daily., Disp: 90 capsule, Rfl: 1  •  Syringe/Needle, Disp, 21G X 1-1/2\" 3 ML misc, Use one q 2 weeks to administer testosterone injection, Disp: 12 each, Rfl: 0  •  Testosterone Enanthate 200 MG/ML solution, Inject 150 mg into the appropriate muscle as directed by prescriber Every 14 (Fourteen) Days. 100 mg is 0.5 ml, Disp: 5 mL, Rfl: 2    Current Facility-Administered Medications:   •  Testosterone Enanthate solution 150 mg, 150 mg, Intramuscular, Q14 Days, Roxanna Diaz MD, 150 mg at 10/17/19 1548  Patient Active Problem List    Diagnosis   • Pituitary hypogonadism (CMS/HCC) [E23.0]   • Asthma [J45.909]   • GERD (gastroesophageal reflux disease) [K21.9]   • Panic attack as reaction to stress [F41.0, F43.0]   • Reaction, situational, acute, to stress [F43.0]   • Shortness of breath [R06.02]   • UTI (urinary tract infection) [N39.0]   • Benign paroxysmal positional vertigo [H81.10]   • Eustachian tube dysfunction [H69.80]     Review of Systems   Constitutional: Negative for activity change, " appetite change, chills, diaphoresis, fatigue, fever and unexpected weight change.   HENT: Negative for congestion, dental problem, drooling, ear discharge, ear pain, facial swelling, hearing loss, mouth sores, nosebleeds, postnasal drip, rhinorrhea, sinus pressure, sneezing, sore throat, tinnitus, trouble swallowing and voice change.    Eyes: Negative for photophobia, pain, discharge, redness, itching and visual disturbance.   Respiratory: Negative for apnea, cough, choking, chest tightness, shortness of breath, wheezing and stridor.    Cardiovascular: Negative for chest pain, palpitations and leg swelling.   Gastrointestinal: Negative for abdominal distention, abdominal pain, anal bleeding, blood in stool, constipation, diarrhea, nausea, rectal pain and vomiting.   Endocrine: Negative for cold intolerance, heat intolerance, polydipsia, polyphagia and polyuria.   Genitourinary: Negative for decreased urine volume, difficulty urinating, dysuria, enuresis, flank pain, frequency, genital sores, hematuria and urgency.   Musculoskeletal: Positive for arthralgias (left knee pain ) and gait problem. Negative for back pain, joint swelling, myalgias, neck pain and neck stiffness.   Skin: Negative for color change, pallor, rash and wound.   Allergic/Immunologic: Negative for environmental allergies, food allergies and immunocompromised state.   Neurological: Negative for dizziness, tremors, seizures, syncope, facial asymmetry, speech difficulty, weakness, light-headedness, numbness and headaches.   Hematological: Negative for adenopathy. Does not bruise/bleed easily.   Psychiatric/Behavioral: Negative for agitation, behavioral problems, confusion, decreased concentration, dysphoric mood, hallucinations, self-injury, sleep disturbance and suicidal ideas. The patient is not nervous/anxious and is not hyperactive.      Social History     Socioeconomic History   • Marital status:      Spouse name: Not on file   • Number  "of children: Not on file   • Years of education: Not on file   • Highest education level: Not on file   Tobacco Use   • Smoking status: Former Smoker     Last attempt to quit: 2016     Years since quitting: 3.8   • Smokeless tobacco: Never Used   Substance and Sexual Activity   • Alcohol use: No   • Drug use: No   • Sexual activity: Defer     No family history on file.  /62   Pulse 71   Ht 177.8 cm (70\")   Wt 71.9 kg (158 lb 9.6 oz)   SpO2 99%   BMI 22.76 kg/m²   Physical Exam   Constitutional: He is oriented to person, place, and time. He appears well-developed and well-nourished.   HENT:   Head: Normocephalic and atraumatic.   Nose: Nose normal.   Mouth/Throat: Oropharynx is clear and moist.   Eyes: Conjunctivae, EOM and lids are normal. Pupils are equal, round, and reactive to light.   Neck: Trachea normal and normal range of motion. Neck supple. Carotid bruit is not present. No tracheal deviation present. No thyroid mass and no thyromegaly present.   Cardiovascular: Normal rate, regular rhythm, normal heart sounds and intact distal pulses. Exam reveals no gallop and no friction rub.   No murmur heard.  Pulmonary/Chest: Effort normal and breath sounds normal. No respiratory distress. He has no wheezes.   Musculoskeletal: Normal range of motion. He exhibits no edema or deformity.   Left knee popping with flexion /crepitance    Lymphadenopathy:     He has no cervical adenopathy.   Neurological: He is alert and oriented to person, place, and time. He has normal reflexes. He displays normal reflexes. No cranial nerve deficit.   Skin: Skin is warm and dry. No rash noted. No cyanosis or erythema. Nails show no clubbing.   Psychiatric: He has a normal mood and affect. His speech is normal and behavior is normal. Judgment and thought content normal. Cognition and memory are normal.   Nursing note and vitals reviewed.    Results for orders placed or performed in visit on 02/26/19   Mononucleosis Screen   Result " Value Ref Range    Monospot Negative Negative   Rei-Barr Virus VCA Antibody Panel   Result Value Ref Range    EBV VCA IgM <36.0 0.0 - 35.9 U/mL    EBV Early Antigen Ab, IgG <9.0 0.0 - 8.9 U/mL    EBV VCA IgG 35.9 (H) 0.0 - 17.9 U/mL    EBV Nuclear Antigen Ab, IgG 369.0 (H) 0.0 - 17.9 U/mL    Interpretation Comment    POCT rapid strep A   Result Value Ref Range    Rapid Strep A Screen Negative Negative, VALID, INVALID, Not Performed    Internal Control Passed Passed    Lot Number [id number]     Expiration Date 06/30/2020      Problem List Items Addressed This Visit        Endocrine    Pituitary hypogonadism (CMS/HCC) - Primary     Check cbc, test/total and free  Waning effect about a week after shot  Is at [facility] currently   Adjust dosing prn   Discussed infertility with testosterone replacement and options to restore fertility in the future if he wishes          Relevant Orders    CBC & Differential    Testosterone    TSH    Testosterone Free Direct    Comprehensive Metabolic Panel    CBC Auto Differential       Musculoskeletal and Integument    Chronic pain of left knee     Refer ortho- has had injury, normal plain films, popping and pain with ambulation  No help thus far with therapy              Return in about 6 months (around 4/28/2020) for Recheck 30 min .    Allyson Duenas MA  Signed Roxanna Diaz MD

## 2019-10-29 RX ORDER — FLUOXETINE HYDROCHLORIDE 20 MG/1
CAPSULE ORAL
Qty: 90 CAPSULE | Refills: 0 | Status: SHIPPED | OUTPATIENT
Start: 2019-10-29 | End: 2020-01-14 | Stop reason: SDUPTHER

## 2019-10-30 LAB — TESTOST FREE SERPL-MCNC: 20.5 PG/ML (ref 9.3–26.5)

## 2019-11-01 ENCOUNTER — CLINICAL SUPPORT (OUTPATIENT)
Dept: INTERNAL MEDICINE | Facility: CLINIC | Age: 24
End: 2019-11-01

## 2019-11-01 DIAGNOSIS — E23.0 PITUITARY HYPOGONADISM (HCC): Primary | ICD-10-CM

## 2019-11-01 PROCEDURE — 96372 THER/PROPH/DIAG INJ SC/IM: CPT | Performed by: INTERNAL MEDICINE

## 2019-11-01 RX ADMIN — TESTOSTERONE ENANTHATE 150 MG: 200 INJECTION, SOLUTION INTRAMUSCULAR at 15:56

## 2019-11-04 ENCOUNTER — TELEPHONE (OUTPATIENT)
Dept: INTERNAL MEDICINE | Facility: CLINIC | Age: 24
End: 2019-11-04

## 2019-11-06 ENCOUNTER — OFFICE VISIT (OUTPATIENT)
Dept: ORTHOPEDIC SURGERY | Facility: CLINIC | Age: 24
End: 2019-11-06

## 2019-11-06 VITALS — OXYGEN SATURATION: 97 % | HEIGHT: 70 IN | BODY MASS INDEX: 22.69 KG/M2 | WEIGHT: 158.51 LBS | HEART RATE: 97 BPM

## 2019-11-06 DIAGNOSIS — G89.29 CHRONIC PAIN OF LEFT KNEE: Primary | ICD-10-CM

## 2019-11-06 DIAGNOSIS — M25.562 CHRONIC PAIN OF LEFT KNEE: Primary | ICD-10-CM

## 2019-11-06 PROCEDURE — 99203 OFFICE O/P NEW LOW 30 MIN: CPT | Performed by: ORTHOPAEDIC SURGERY

## 2019-11-06 NOTE — PROGRESS NOTES
"    [facility]    Subjective     Chief Complaint   Patient presents with   • Left Knee - Pain        HPI    Blade Hall is a 24 y.o. male.  He presents today for evaluation of left knee pain.  He has pain for 6 months, following no injury.  The pain is associate with popping, and worsens with standing and walking.  Pain is 6 out of 10, shooting in quality.  He has tried physical therapy without benefit.      Patient Active Problem List   Diagnosis   • Asthma   • GERD (gastroesophageal reflux disease)   • Panic attack as reaction to stress   • Reaction, situational, acute, to stress   • Shortness of breath   • UTI (urinary tract infection)   • Benign paroxysmal positional vertigo   • Eustachian tube dysfunction   • Pituitary hypogonadism (CMS/HCC)   • Chronic pain of left knee     Past Medical History:   Diagnosis Date   • Asthma    • GERD (gastroesophageal reflux disease)    • Low testosterone in male       Past Surgical History:   Procedure Laterality Date   • APPENDECTOMY        History reviewed. No pertinent family history.  Social History     Socioeconomic History   • Marital status:      Spouse name: Not on file   • Number of children: Not on file   • Years of education: Not on file   • Highest education level: Not on file   Tobacco Use   • Smoking status: Former Smoker     Last attempt to quit: 2016     Years since quitting: 3.8   • Smokeless tobacco: Never Used   Substance and Sexual Activity   • Alcohol use: No   • Drug use: No   • Sexual activity: Defer      Current Outpatient Medications on File Prior to Visit   Medication Sig Dispense Refill   • atenolol (TENORMIN) 25 MG tablet Take 1 tablet by mouth Daily. 90 tablet 1   • FLUoxetine (PROzac) 20 MG capsule TAKE ONE CAPSULE BY MOUTH DAILY 90 capsule 0   • omeprazole (priLOSEC) 20 MG capsule Take 1 capsule by mouth Daily. 90 capsule 1   • Syringe/Needle, Disp, 21G X 1-1/2\" 3 ML misc Use one q 2 weeks to administer " testosterone injection 12 each 0   • Testosterone Enanthate 200 MG/ML solution Inject 150 mg into the appropriate muscle as directed by prescriber Every 14 (Fourteen) Days. 100 mg is 0.5 ml 5 mL 2     Current Facility-Administered Medications on File Prior to Visit   Medication Dose Route Frequency Provider Last Rate Last Dose   • Testosterone Enanthate solution 150 mg  150 mg Intramuscular Q14 Days Roxanna Diaz MD   150 mg at 11/01/19 1556      No Known Allergies     Review of Systems   Constitutional: Negative.  Negative for activity change, appetite change, chills, diaphoresis, fatigue, fever and unexpected weight change.   HENT: Negative.  Negative for congestion, dental problem, drooling, ear discharge, ear pain, facial swelling, hearing loss, mouth sores, nosebleeds, postnasal drip, rhinorrhea, sinus pressure, sneezing, sore throat, tinnitus, trouble swallowing and voice change.    Eyes: Negative.  Negative for photophobia, pain, discharge, redness, itching and visual disturbance.   Respiratory: Negative.  Negative for apnea, cough, choking, chest tightness, shortness of breath, wheezing and stridor.    Cardiovascular: Negative.  Negative for chest pain, palpitations and leg swelling.   Gastrointestinal: Negative.  Negative for abdominal distention, abdominal pain, anal bleeding, blood in stool, constipation, diarrhea, nausea, rectal pain and vomiting.   Endocrine: Negative.  Negative for cold intolerance, heat intolerance, polydipsia, polyphagia and polyuria.   Genitourinary: Negative.  Negative for decreased urine volume, difficulty urinating, dysuria, enuresis, flank pain, frequency, genital sores, hematuria and urgency.   Musculoskeletal: Positive for arthralgias. Negative for back pain, gait problem, joint swelling, myalgias, neck pain and neck stiffness.   Skin: Negative.  Negative for color change, pallor, rash and wound.   Allergic/Immunologic: Negative.  Negative for environmental allergies,  "food allergies and immunocompromised state.   Neurological: Negative.  Negative for dizziness, tremors, seizures, syncope, facial asymmetry, speech difficulty, weakness, light-headedness, numbness and headaches.   Hematological: Negative.  Negative for adenopathy. Does not bruise/bleed easily.   Psychiatric/Behavioral: Negative.  Negative for agitation, behavioral problems, confusion, decreased concentration, dysphoric mood, hallucinations, self-injury, sleep disturbance and suicidal ideas. The patient is not nervous/anxious and is not hyperactive.         Objective      Physical Exam  Pulse 97   Ht 177.8 cm (70\")   Wt 71.9 kg (158 lb 8.2 oz)   SpO2 97%   BMI 22.74 kg/m²     Body mass index is 22.74 kg/m².    General:   Mental Status:  Alert   Appearance: Cooperative, in no acute distress   Build and Nutrition: Well-nourished well-developed male   Orientation: Alert and oriented to person, place and time   Posture: Normal   Gait: Normal    Integument:   Left knee: No skin lesions, no rash, no ecchymosis    Neurologic:   Sensation:    Left foot: Intact to light touch on the dorsal and plantar aspect   Motor:  Left lower extremity: 5/5 quadriceps, hamstrings, ankle dorsiflexors, and ankle plantar flexors  Vascular:   Left lower extremity: 2+ dorsalis pedis pulse, prompt capillary refill    Lower Extremities:   Left Knee:    Tenderness:  None    Effusion:  None    Swelling:  None    Crepitus:  Positive    Atrophy:  None    Range of motion:  Extension: 0°       Flexion: 135°  Instability:  No varus laxity, no valgus laxity, negative anterior drawer  Deformities:  None      Imaging/Studies    EXAMINATION: XR LEFT KNEE, 2 VIEWS - 05/31/2019     INDICATION: M25.562-Pain in left knee.      COMPARISON: NONE     FINDINGS: Two views of the left knee reveal no evidence of fracture or  dislocation. The cortex is intact. Joint spaces are preserved. No soft  tissue abnormality identified. No joint effusion.       "   IMPRESSION:  No acute bony abnormality.     DICTATED:   05/31/2019  EDITED/ls :   05/31/2019      This report was finalized on 6/3/2019 4:45 PM by Dr. Karla Partida MD.    Assessment and Plan     Ameen was seen today for pain.    Diagnoses and all orders for this visit:    Chronic pain of left knee  -     MRI Knee Left Without Contrast; Future        1. Chronic pain of left knee        I reviewed my findings with the patient today.  He does have pain in the left knee for the past 6 months, and I am suspicious of a possible meniscus tear.  At this point, recommend an MRI.  I will see him back after the MRI, but sooner for any problems.    Return for After Imaging Study.      Medical Decision Making  Data/Risk: independent visualization of imaging, lab tests, or EMG/NCV      Joni Pritchett MD  11/06/19  2:10 PM

## 2019-11-06 NOTE — TELEPHONE ENCOUNTER
Letter complete and in queue but testosterone levels are good and I am not recommending any changes

## 2019-11-07 NOTE — TELEPHONE ENCOUNTER
Left message for patient with information provided by Dr. Diaz.  Informed patient letter for labs was mailed on 11/5/19.  Any further questions please contact us.

## 2019-11-11 ENCOUNTER — HOSPITAL ENCOUNTER (OUTPATIENT)
Dept: MRI IMAGING | Facility: HOSPITAL | Age: 24
Discharge: HOME | End: 2019-11-11
Admitting: ORTHOPAEDIC SURGERY

## 2019-11-11 DIAGNOSIS — G89.29 CHRONIC PAIN OF LEFT KNEE: ICD-10-CM

## 2019-11-11 DIAGNOSIS — M25.562 CHRONIC PAIN OF LEFT KNEE: ICD-10-CM

## 2019-11-11 PROCEDURE — 73721 MRI JNT OF LWR EXTRE W/O DYE: CPT

## 2019-11-14 ENCOUNTER — CLINICAL SUPPORT (OUTPATIENT)
Dept: INTERNAL MEDICINE | Facility: CLINIC | Age: 24
End: 2019-11-14

## 2019-11-14 DIAGNOSIS — E23.0 PITUITARY HYPOGONADISM (HCC): Primary | ICD-10-CM

## 2019-11-14 PROCEDURE — 96372 THER/PROPH/DIAG INJ SC/IM: CPT | Performed by: INTERNAL MEDICINE

## 2019-11-14 RX ADMIN — TESTOSTERONE ENANTHATE 150 MG: 200 INJECTION, SOLUTION INTRAMUSCULAR at 16:20

## 2019-11-20 ENCOUNTER — OFFICE VISIT (OUTPATIENT)
Dept: ORTHOPEDIC SURGERY | Facility: CLINIC | Age: 24
End: 2019-11-20

## 2019-11-20 VITALS — WEIGHT: 160.05 LBS | HEART RATE: 61 BPM | BODY MASS INDEX: 22.91 KG/M2 | OXYGEN SATURATION: 99 % | HEIGHT: 70 IN

## 2019-11-20 DIAGNOSIS — G89.29 CHRONIC PAIN OF LEFT KNEE: Primary | ICD-10-CM

## 2019-11-20 DIAGNOSIS — M25.562 CHRONIC PAIN OF LEFT KNEE: Primary | ICD-10-CM

## 2019-11-20 PROCEDURE — 99213 OFFICE O/P EST LOW 20 MIN: CPT | Performed by: ORTHOPAEDIC SURGERY

## 2019-11-20 NOTE — PROGRESS NOTES
"    [facility] [place] [facility] Note    Subjective     Chief Complaint   Patient presents with   • Left Knee - Follow-up     MRI follow up. 11/11/19        HPI    Blade Hall is a 24 y.o. male.  He follows up today for the MRI results of his left knee.  No new complaints today.  Symptoms are unchanged from his last visit.  Pain in the knee, with popping, worse with walking and standing, present for 6 months.      Patient Active Problem List   Diagnosis   • Asthma   • GERD (gastroesophageal reflux disease)   • Panic attack as reaction to stress   • Reaction, situational, acute, to stress   • Shortness of breath   • UTI (urinary tract infection)   • Benign paroxysmal positional vertigo   • Eustachian tube dysfunction   • Pituitary hypogonadism (CMS/HCC)   • Chronic pain of left knee     Past Medical History:   Diagnosis Date   • Asthma    • GERD (gastroesophageal reflux disease)    • Low testosterone in male       Past Surgical History:   Procedure Laterality Date   • APPENDECTOMY        History reviewed. No pertinent family history.  Social History     Socioeconomic History   • Marital status:      Spouse name: Not on file   • Number of children: Not on file   • Years of education: Not on file   • Highest education level: Not on file   Tobacco Use   • Smoking status: Former Smoker     Last attempt to quit: 2016     Years since quitting: 3.8   • Smokeless tobacco: Never Used   Substance and Sexual Activity   • Alcohol use: No   • Drug use: No   • Sexual activity: Defer      Current Outpatient Medications on File Prior to Visit   Medication Sig Dispense Refill   • atenolol (TENORMIN) 25 MG tablet Take 1 tablet by mouth Daily. 90 tablet 1   • FLUoxetine (PROzac) 20 MG capsule TAKE ONE CAPSULE BY MOUTH DAILY 90 capsule 0   • omeprazole (priLOSEC) 20 MG capsule Take 1 capsule by mouth Daily. 90 capsule 1   • Syringe/Needle, Disp, 21G X 1-1/2\" 3 ML misc Use one q 2 weeks to administer testosterone injection 12 " "each 0   • Testosterone Enanthate 200 MG/ML solution Inject 150 mg into the appropriate muscle as directed by prescriber Every 14 (Fourteen) Days. 100 mg is 0.5 ml 5 mL 2     Current Facility-Administered Medications on File Prior to Visit   Medication Dose Route Frequency Provider Last Rate Last Dose   • Testosterone Enanthate solution 150 mg  150 mg Intramuscular Q14 Days Roxanna Diaz MD   150 mg at 11/14/19 1620      No Known Allergies     Review of Systems   Constitutional: Negative.    HENT: Negative.    Eyes: Negative.    Respiratory: Negative.    Cardiovascular: Negative.    Gastrointestinal: Negative.    Endocrine: Negative.    Genitourinary: Negative.    Musculoskeletal: Positive for arthralgias.   Skin: Negative.    Allergic/Immunologic: Negative.    Neurological: Negative.    Hematological: Negative.    Psychiatric/Behavioral: Negative.         Objective      Physical Exam  Pulse 61   Ht 177.6 cm (69.92\")   Wt 72.6 kg (160 lb 0.9 oz)   SpO2 99%   BMI 23.02 kg/m²     Body mass index is 23.02 kg/m².    General:   Mental Status:  Alert   Appearance: Cooperative, in no acute distress   Build and Nutrition: Well-nourished well-developed male   Orientation: Alert and oriented to person, place and time   Posture: Normal   Gait: Normal    Integument:              Left knee: No skin lesions, no rash, no ecchymosis     Lower Extremities:              Left Knee:                          Tenderness:    None                          Effusion:          None                          Swelling:          None                          Crepitus:          Positive                          Atrophy:           None                          Range of motion:        Extension:       0°                                                              Flexion:           135°  Instability:        No varus laxity, no valgus laxity, negative anterior drawer  Deformities:     None    Imaging/Studies    EXAMINATION: MRI KNEE " LEFT  WO CONTRAST-     INDICATION: Knee pain, x-ray internal derangement; M25.562-Pain in left  knee; G89.29-Other chronic pain.     TECHNIQUE: Multiplanar, multisequence MRI imaging of the left knee was  performed without intravenous gadolinium contrast.     COMPARISON: Left knee radiograph 05/31/2019.     FINDINGS: LIGAMENTOUS TENDONS: The anterior cruciate ligament and  posterior cruciate ligament appear intact. The medial and lateral  collateral ligaments appear intact. The semimembranosus and tendinosis  tendon is appear intact. The gracilis and sartorius tendons appear  intact. Popliteus tendon appears intact. The medial and lateral patellar  and retinacular appear intact. The quadriceps and patellar tendons  appear intact.     MENISCI: The medial meniscus is intact. Identified involving the  posterior horn of the lateral meniscus is a likely 6 mm perimeniscal  cyst.     CARTILAGE AND OSSEOUS STRUCTURES: The cartilage appears intact without  evidence of full-thickness erosive changes or abnormal osseous edema  identified. The cartilage of the patellofemoral joint appears intact. No  osteochondral defects or other acute osseous abnormality appreciated.  The muscle signal in the knee musculature appears intact. No joint  effusion identified.     IMPRESSION:  Abnormal cystic appearance of the posterior horn of the  lateral meniscus with a likely 6 mm meniscal cyst with additional  abnormal fraying/appearance of the posterior meniscal struts suggestive  of a likely prior meniscal tear with associated healing/scar formation.  There is linear fluid signal involving the posterior inferior aspect of  this region near the meniscal strut concerning for a small persistent  tear.     D:  11/11/2019  E:  11/11/2019     This report was finalized on 11/13/2019 8:30 AM by Dr. Dav Isabel MD.    Assessment and Plan     Ameen was seen today for follow-up.    Diagnoses and all orders for this visit:    Chronic pain of left  knee        1. Chronic pain of left knee        I reviewed my findings with patient today.  MRI suggested possible lateral meniscus tear, in the posterior horn of the lateral meniscus.  We discussed treatment options, and he is considering arthroscopic intervention.  He would like to discuss with his family.  He will contact me if and when he would like to proceed.  Risk, benefits, and alternatives of the procedure were discussed.  Please see my counseling note for details.    Surgical Counseling     After examining the patient and reviewing the diagnostic studies, I discussed the non-operative and surgical options for their knee problem. The patient wishes to proceed with operative intervention, knowing the risks, benefits and alternatives to the surgical procedure.  Risks were discussed, which included, but are not limited to: bleeding, infection, damage to blood vessels and nerves, no improvement in symptoms, worsening symptoms, incomplete pain relief, need for further surgery, deep venous thrombosis, pulmonary embolus, death and anesthetic complications.  The patient understands, consents, and their questions were answered.  The typical rehabilitative course was also discussed.  We will schedule surgery at a mutually convenient time in the near future.    Return for If he would like to proceed with knee arthroscopy after discussing with his family.      Medical Decision Making  Data/Risk: independent visualization of imaging, lab tests, or EMG/NCV      Joni Pritchett MD  11/20/19  2:53 PM

## 2019-11-27 ENCOUNTER — CLINICAL SUPPORT (OUTPATIENT)
Dept: INTERNAL MEDICINE | Facility: CLINIC | Age: 24
End: 2019-11-27

## 2019-11-27 DIAGNOSIS — E23.0 PITUITARY HYPOGONADISM (HCC): ICD-10-CM

## 2019-11-27 PROCEDURE — 96372 THER/PROPH/DIAG INJ SC/IM: CPT | Performed by: INTERNAL MEDICINE

## 2019-11-27 RX ADMIN — TESTOSTERONE ENANTHATE 150 MG: 200 INJECTION, SOLUTION INTRAMUSCULAR at 15:55

## 2019-12-09 DIAGNOSIS — E23.0 HYPOGONADOTROPIC HYPOGONADISM (HCC): Primary | ICD-10-CM

## 2019-12-10 RX ORDER — TESTOSTERONE ENANTHATE 200 MG/ML
INJECTION, SOLUTION INTRAMUSCULAR
Qty: 5 ML | Refills: 2 | Status: SHIPPED | OUTPATIENT
Start: 2019-12-10 | End: 2020-08-17

## 2019-12-10 NOTE — TELEPHONE ENCOUNTER
LOV:  10/28/19    Testosterone Free Direct:  10/28/19    Testosterone:  10/28/19      Dr. Diaz, please advice.  Thank you.

## 2019-12-12 ENCOUNTER — CLINICAL SUPPORT (OUTPATIENT)
Dept: INTERNAL MEDICINE | Facility: CLINIC | Age: 24
End: 2019-12-12

## 2019-12-12 RX ADMIN — TESTOSTERONE ENANTHATE 150 MG: 200 INJECTION, SOLUTION INTRAMUSCULAR at 16:28

## 2019-12-26 ENCOUNTER — CLINICAL SUPPORT (OUTPATIENT)
Dept: INTERNAL MEDICINE | Facility: CLINIC | Age: 24
End: 2019-12-26

## 2019-12-26 DIAGNOSIS — E23.0 PITUITARY HYPOGONADISM (HCC): ICD-10-CM

## 2019-12-26 PROCEDURE — 96372 THER/PROPH/DIAG INJ SC/IM: CPT | Performed by: INTERNAL MEDICINE

## 2019-12-26 RX ADMIN — TESTOSTERONE ENANTHATE 150 MG: 200 INJECTION, SOLUTION INTRAMUSCULAR at 15:51

## 2020-01-14 ENCOUNTER — OFFICE VISIT (OUTPATIENT)
Dept: INTERNAL MEDICINE | Facility: CLINIC | Age: 25
End: 2020-01-14

## 2020-01-14 ENCOUNTER — APPOINTMENT (OUTPATIENT)
Dept: LAB | Facility: HOSPITAL | Age: 25
End: 2020-01-14

## 2020-01-14 VITALS
HEIGHT: 69 IN | DIASTOLIC BLOOD PRESSURE: 70 MMHG | SYSTOLIC BLOOD PRESSURE: 128 MMHG | OXYGEN SATURATION: 99 % | BODY MASS INDEX: 23.49 KG/M2 | HEART RATE: 65 BPM | WEIGHT: 158.6 LBS

## 2020-01-14 DIAGNOSIS — S83.207S ACUTE MENISCAL TEAR OF LEFT KNEE, SEQUELA: Primary | ICD-10-CM

## 2020-01-14 DIAGNOSIS — F43.0 REACTION, SITUATIONAL, ACUTE, TO STRESS: ICD-10-CM

## 2020-01-14 DIAGNOSIS — Z00.00 ANNUAL PHYSICAL EXAM: ICD-10-CM

## 2020-01-14 DIAGNOSIS — R53.83 OTHER FATIGUE: ICD-10-CM

## 2020-01-14 DIAGNOSIS — R00.2 HEART PALPITATIONS: ICD-10-CM

## 2020-01-14 DIAGNOSIS — K21.9 GASTROESOPHAGEAL REFLUX DISEASE WITHOUT ESOPHAGITIS: ICD-10-CM

## 2020-01-14 LAB
25(OH)D3 SERPL-MCNC: 44.7 NG/ML (ref 30–100)
ALBUMIN SERPL-MCNC: 4.8 G/DL (ref 3.5–5.2)
ALP SERPL-CCNC: 53 U/L (ref 39–117)
ALT SERPL W P-5'-P-CCNC: 20 U/L (ref 1–41)
AST SERPL-CCNC: 23 U/L (ref 1–40)
BILIRUB CONJ SERPL-MCNC: 0.3 MG/DL (ref 0.2–0.3)
BILIRUB INDIRECT SERPL-MCNC: 1.4 MG/DL
BILIRUB SERPL-MCNC: 1.7 MG/DL (ref 0.2–1.2)
CHOLEST SERPL-MCNC: 143 MG/DL (ref 0–200)
HDLC SERPL-MCNC: 44 MG/DL (ref 40–60)
LDLC SERPL CALC-MCNC: 85 MG/DL (ref 0–100)
LDLC/HDLC SERPL: 1.93 {RATIO}
PROT SERPL-MCNC: 8.1 G/DL (ref 6–8.5)
PSA SERPL-MCNC: 0.54 NG/ML (ref 0–4)
TRIGL SERPL-MCNC: 70 MG/DL (ref 0–150)
VIT B12 BLD-MCNC: 1101 PG/ML (ref 211–946)
VLDLC SERPL-MCNC: 14 MG/DL (ref 5–40)

## 2020-01-14 PROCEDURE — 80076 HEPATIC FUNCTION PANEL: CPT | Performed by: INTERNAL MEDICINE

## 2020-01-14 PROCEDURE — 82306 VITAMIN D 25 HYDROXY: CPT | Performed by: INTERNAL MEDICINE

## 2020-01-14 PROCEDURE — 80061 LIPID PANEL: CPT | Performed by: INTERNAL MEDICINE

## 2020-01-14 PROCEDURE — 99395 PREV VISIT EST AGE 18-39: CPT | Performed by: INTERNAL MEDICINE

## 2020-01-14 PROCEDURE — 82607 VITAMIN B-12: CPT | Performed by: INTERNAL MEDICINE

## 2020-01-14 PROCEDURE — G0103 PSA SCREENING: HCPCS | Performed by: INTERNAL MEDICINE

## 2020-01-14 RX ORDER — FLUOXETINE HYDROCHLORIDE 20 MG/1
20 CAPSULE ORAL DAILY
Qty: 90 CAPSULE | Refills: 1 | Status: SHIPPED | OUTPATIENT
Start: 2020-01-14 | End: 2020-07-11 | Stop reason: SDUPTHER

## 2020-01-14 RX ORDER — ATENOLOL 25 MG/1
25 TABLET ORAL DAILY
Qty: 90 TABLET | Refills: 1 | Status: SHIPPED | OUTPATIENT
Start: 2020-01-14 | End: 2020-07-11 | Stop reason: SDUPTHER

## 2020-01-14 RX ORDER — OMEPRAZOLE 20 MG/1
20 CAPSULE, DELAYED RELEASE ORAL DAILY
Qty: 90 CAPSULE | Refills: 1 | Status: SHIPPED | OUTPATIENT
Start: 2020-01-14 | End: 2020-07-11 | Stop reason: SDUPTHER

## 2020-01-14 NOTE — PROGRESS NOTES
Chief Complaint   Patient presents with   • Annual Exam   • Knee Pain     left, has had an MRI, needing surgery, would like referral to ortho.       History of Present Illness  HM, Adult Male: The patient is being seen for a health maintenance evaluation. The last health maintenance visit was 1 year(s) ago.   Social History: Household members include spouse. He is  . Work status: working full time and going to school. The patient has never smoked cigarettes. He reports never drinking alcohol. He has never used illicit drugs.   General Health: The patient's health is described as good. He has regular dental visits. He denies vision problems. He denies hearing loss. Immunizations status: up to date.   Lifestyle:. He consumes a diverse and healthy diet. He does not have any weight concerns. He exercises regularly. He does not use tobacco. He denies alcohol use. He denies drug use.   Screening: Cancer screening reviewed and current.   Metabolic screening reviewed and current.   Risk screening reviewed and current.     Left knee pain and instability, MRI with meniscal tear, and is unsure about surgery.  Testosterone shots helps the pain. meds don't      Review of Systems   Constitutional: Negative for chills and fatigue.   HENT: Negative for congestion, ear pain and sore throat.    Eyes: Negative for pain, redness and visual disturbance.   Respiratory: Negative for cough and shortness of breath.    Cardiovascular: Negative for chest pain, palpitations and leg swelling.   Gastrointestinal: Negative for abdominal pain, diarrhea and nausea.   Endocrine: Negative for cold intolerance and heat intolerance.   Genitourinary: Negative for flank pain and urgency.   Musculoskeletal: Positive for arthralgias and gait problem.   Skin: Negative for pallor and rash.   Neurological: Negative for dizziness, weakness and headaches.   Psychiatric/Behavioral: Negative for dysphoric mood and sleep disturbance. The patient is not  "nervous/anxious.        Patient Active Problem List   Diagnosis   • Asthma   • GERD (gastroesophageal reflux disease)   • Panic attack as reaction to stress   • Reaction, situational, acute, to stress   • Shortness of breath   • UTI (urinary tract infection)   • Benign paroxysmal positional vertigo   • Eustachian tube dysfunction   • Pituitary hypogonadism (CMS/HCC)   • Chronic pain of left knee       Social History     Socioeconomic History   • Marital status:      Spouse name: Not on file   • Number of children: Not on file   • Years of education: Not on file   • Highest education level: Not on file   Tobacco Use   • Smoking status: Former Smoker     Last attempt to quit: 2016     Years since quittin.0   • Smokeless tobacco: Never Used   Substance and Sexual Activity   • Alcohol use: No   • Drug use: No   • Sexual activity: Defer       Current Outpatient Medications on File Prior to Visit   Medication Sig Dispense Refill   • Syringe/Needle, Disp, 21G X 1-1/2\" 3 ML misc Use one q 2 weeks to administer testosterone injection 12 each 0   • Testosterone Enanthate 200 MG/ML solution INJECT 0.75 ML (150 MG) INTO APPROPRIATE MUSCLE AS DIRECTED BY PERSCRIBER EVERY 14 DAYS. 5 mL 2   • [DISCONTINUED] atenolol (TENORMIN) 25 MG tablet Take 1 tablet by mouth Daily. 90 tablet 1   • [DISCONTINUED] FLUoxetine (PROzac) 20 MG capsule TAKE ONE CAPSULE BY MOUTH DAILY 90 capsule 0   • [DISCONTINUED] omeprazole (priLOSEC) 20 MG capsule Take 1 capsule by mouth Daily. 90 capsule 1     Current Facility-Administered Medications on File Prior to Visit   Medication Dose Route Frequency Provider Last Rate Last Dose   • Testosterone Enanthate solution 150 mg  150 mg Intramuscular Q14 Days Roxanna Diaz MD   150 mg at 19 1551       No Known Allergies    /70   Pulse 65   Ht 175.3 cm (69\")   Wt 71.9 kg (158 lb 9.6 oz)   SpO2 99% Comment: ra  BMI 23.42 kg/m²     Current outpatient medication,and allergies " reviewed.         Physical Exam   Constitutional: He is oriented to person, place, and time. He appears well-developed and well-nourished.   HENT:   Head: Normocephalic and atraumatic.   Right Ear: External ear normal.   Left Ear: External ear normal.   Mouth/Throat: No oropharyngeal exudate.   Eyes: Pupils are equal, round, and reactive to light. Conjunctivae are normal.   Neck: Neck supple. No thyromegaly present.   Cardiovascular: Normal rate, regular rhythm and intact distal pulses. Exam reveals no friction rub.   No murmur heard.  Pulmonary/Chest: Effort normal and breath sounds normal.   Abdominal: Soft. Bowel sounds are normal. He exhibits no distension. There is no tenderness.   Musculoskeletal: He exhibits tenderness. He exhibits no edema or deformity.   Neurological: He is alert and oriented to person, place, and time. No cranial nerve deficit.   Skin: Skin is warm and dry.   Psychiatric: He has a normal mood and affect. Judgment normal.   Nursing note and vitals reviewed.      Results for orders placed or performed in visit on 10/28/19   Testosterone   Result Value Ref Range    Testosterone, Total 453.00 249.00 - 836.00 ng/dL   TSH   Result Value Ref Range    TSH 1.070 0.270 - 4.200 uIU/mL   Testosterone Free Direct   Result Value Ref Range    Testosterone, Free 20.5 9.3 - 26.5 pg/mL   Comprehensive Metabolic Panel   Result Value Ref Range    Glucose 92 65 - 99 mg/dL    BUN 13 6 - 20 mg/dL    Creatinine 0.84 0.76 - 1.27 mg/dL    Sodium 137 136 - 145 mmol/L    Potassium 4.1 3.5 - 5.2 mmol/L    Chloride 98 98 - 107 mmol/L    CO2 33.1 (H) 22.0 - 29.0 mmol/L    Calcium 9.4 8.6 - 10.5 mg/dL    Total Protein 7.8 6.0 - 8.5 g/dL    Albumin 4.40 3.50 - 5.20 g/dL    ALT (SGPT) 26 1 - 41 U/L    AST (SGOT) 27 1 - 40 U/L    Alkaline Phosphatase 50 39 - 117 U/L    Total Bilirubin 1.1 0.2 - 1.2 mg/dL    eGFR Non African Amer 110 >60 mL/min/1.73    Globulin 3.4 gm/dL    A/G Ratio 1.3 g/dL    BUN/Creatinine Ratio 15.5 7.0  - 25.0    Anion Gap 5.9 5.0 - 15.0 mmol/L   CBC Auto Differential   Result Value Ref Range    WBC 7.74 3.40 - 10.80 10*3/mm3    RBC 5.48 4.14 - 5.80 10*6/mm3    Hemoglobin 16.4 13.0 - 17.7 g/dL    Hematocrit 50.2 37.5 - 51.0 %    MCV 91.6 79.0 - 97.0 fL    MCH 29.9 26.6 - 33.0 pg    MCHC 32.7 31.5 - 35.7 g/dL    RDW 12.1 (L) 12.3 - 15.4 %    RDW-SD 40.8 37.0 - 54.0 fl    MPV 12.3 (H) 6.0 - 12.0 fL    Platelets 177 140 - 450 10*3/mm3    Neutrophil % 52.1 42.7 - 76.0 %    Lymphocyte % 29.1 19.6 - 45.3 %    Monocyte % 6.3 5.0 - 12.0 %    Eosinophil % 11.1 (H) 0.3 - 6.2 %    Basophil % 1.3 0.0 - 1.5 %    Immature Grans % 0.1 0.0 - 0.5 %    Neutrophils, Absolute 4.03 1.70 - 7.00 10*3/mm3    Lymphocytes, Absolute 2.25 0.70 - 3.10 10*3/mm3    Monocytes, Absolute 0.49 0.10 - 0.90 10*3/mm3    Eosinophils, Absolute 0.86 (H) 0.00 - 0.40 10*3/mm3    Basophils, Absolute 0.10 0.00 - 0.20 10*3/mm3    Immature Grans, Absolute 0.01 0.00 - 0.05 10*3/mm3    nRBC 0.0 0.0 - 0.2 /100 WBC       Ameen was seen today for annual exam and knee pain.    Diagnoses and all orders for this visit:    Acute meniscal tear of left knee, sequela  -     Ambulatory Referral to Orthopedic Surgery    Other fatigue  -     atenolol (TENORMIN) 25 MG tablet; Take 1 tablet by mouth Daily.    Heart palpitations  -     atenolol (TENORMIN) 25 MG tablet; Take 1 tablet by mouth Daily.    Gastroesophageal reflux disease without esophagitis  -     omeprazole (priLOSEC) 20 MG capsule; Take 1 capsule by mouth Daily.    Reaction, situational, acute, to stress  -     FLUoxetine (PROzac) 20 MG capsule; Take 1 capsule by mouth Daily.    Annual physical exam  -     PSA Screen  -     Vitamin D 25 Hydroxy  -     Vitamin B12  -     Lipid Panel  -     Hepatic Function Panel        Health Maintenance   Topic Date Due   • TDAP/TD VACCINES (1 - Tdap) 01/13/2023 (Originally 6/29/2004)   • ANNUAL PHYSICAL  01/15/2021   • INFLUENZA VACCINE  Addressed        Discussion/Summary  Impression: health maintenance visit, healthy adult male.   Currently, he eats a healthy diet and has an adequate exercise regimen.   Prostate cancer screening: PSA was ordered.   Testicular cancer screening: monthly self testicular exam was advised.   Colorectal cancer screening: fecal occult blood testing is needed every year and colonoscopy is not indicated .   Screening lab work includes glucose, lipid profile and 25-hydroxyvitamin D.   The immunizations are up to date.   Advice and education were given regarding cardiovascular risk reduction, healthy dietary habits, Seatbelt and helmet use and self skin examination.        Return in about 1 year (around 1/14/2021) for Annual.

## 2020-01-23 ENCOUNTER — CLINICAL SUPPORT (OUTPATIENT)
Dept: INTERNAL MEDICINE | Facility: CLINIC | Age: 25
End: 2020-01-23

## 2020-01-23 DIAGNOSIS — E23.0 PITUITARY HYPOGONADISM (HCC): Primary | ICD-10-CM

## 2020-01-23 PROCEDURE — 96372 THER/PROPH/DIAG INJ SC/IM: CPT | Performed by: INTERNAL MEDICINE

## 2020-01-23 RX ADMIN — TESTOSTERONE ENANTHATE 150 MG: 200 INJECTION, SOLUTION INTRAMUSCULAR at 15:42

## 2020-02-06 ENCOUNTER — CLINICAL SUPPORT (OUTPATIENT)
Dept: ENDOCRINOLOGY | Facility: CLINIC | Age: 25
End: 2020-02-06

## 2020-02-06 VITALS — BODY MASS INDEX: 23.42 KG/M2 | HEIGHT: 69 IN

## 2020-02-06 DIAGNOSIS — E23.0 PITUITARY HYPOGONADISM (HCC): Primary | ICD-10-CM

## 2020-02-06 PROCEDURE — 96372 THER/PROPH/DIAG INJ SC/IM: CPT | Performed by: INTERNAL MEDICINE

## 2020-02-06 RX ADMIN — TESTOSTERONE ENANTHATE 150 MG: 200 INJECTION, SOLUTION INTRAMUSCULAR at 09:53

## 2020-02-20 ENCOUNTER — CLINICAL SUPPORT (OUTPATIENT)
Dept: ENDOCRINOLOGY | Facility: CLINIC | Age: 25
End: 2020-02-20

## 2020-02-20 DIAGNOSIS — E23.0 PITUITARY HYPOGONADISM (HCC): ICD-10-CM

## 2020-02-20 PROCEDURE — 96372 THER/PROPH/DIAG INJ SC/IM: CPT | Performed by: INTERNAL MEDICINE

## 2020-02-20 RX ADMIN — TESTOSTERONE ENANTHATE 150 MG: 200 INJECTION, SOLUTION INTRAMUSCULAR at 16:03

## 2020-03-26 ENCOUNTER — PRIOR AUTHORIZATION (OUTPATIENT)
Dept: ENDOCRINOLOGY | Facility: CLINIC | Age: 25
End: 2020-03-26

## 2020-03-26 NOTE — TELEPHONE ENCOUNTER
Oumou WOODRUFF # [id number]  For patient for Testosterone Enanthate 200mg/ML.  Information was sent to Jermaine

## 2020-04-02 ENCOUNTER — CLINICAL SUPPORT (OUTPATIENT)
Dept: ENDOCRINOLOGY | Facility: CLINIC | Age: 25
End: 2020-04-02

## 2020-04-02 DIAGNOSIS — E23.0 PITUITARY HYPOGONADISM (HCC): ICD-10-CM

## 2020-04-02 PROCEDURE — 96372 THER/PROPH/DIAG INJ SC/IM: CPT | Performed by: INTERNAL MEDICINE

## 2020-04-06 RX ADMIN — TESTOSTERONE ENANTHATE 150 MG: 200 INJECTION, SOLUTION INTRAMUSCULAR at 09:23

## 2020-04-16 ENCOUNTER — CLINICAL SUPPORT (OUTPATIENT)
Dept: ENDOCRINOLOGY | Facility: CLINIC | Age: 25
End: 2020-04-16

## 2020-04-16 DIAGNOSIS — E23.0 PITUITARY HYPOGONADISM (HCC): ICD-10-CM

## 2020-04-16 PROCEDURE — 96372 THER/PROPH/DIAG INJ SC/IM: CPT | Performed by: INTERNAL MEDICINE

## 2020-04-16 NOTE — PROGRESS NOTES
Pt given 0.75 ml Testosterone injection. Pt handled injection well and will return in 2 weeks for the next injection.

## 2020-04-24 ENCOUNTER — TELEMEDICINE (OUTPATIENT)
Dept: ENDOCRINOLOGY | Facility: CLINIC | Age: 25
End: 2020-04-24

## 2020-04-24 DIAGNOSIS — E23.0 PITUITARY HYPOGONADISM (HCC): Primary | ICD-10-CM

## 2020-04-24 PROCEDURE — 99213 OFFICE O/P EST LOW 20 MIN: CPT | Performed by: INTERNAL MEDICINE

## 2020-04-24 NOTE — ASSESSMENT & PLAN NOTE
On 150 mg testosterone every 2 weeks  Continue supplementation   Check cbc and total and free T   Recent psa reviewed and was normal  bp is good  Cholesterol is good as well  No changes otherwise  F/u 6 months

## 2020-04-24 NOTE — PROGRESS NOTES
"Blade [facility] 24 y.o.  CC: Follow-up and Hypogonadism (on testosterone supplementation )      [place]: Follow-up and Hypogonadism (on testosterone supplementation )  This was an audio and video enabled telemedicine encounter conducted with patient consent and in compliance with patient concerns about safety during pandemic  Visit conducted via doximety - total duration of direct face to face contact 11 minuts   Is on testosterone 150 mg every 2 weeks  Efficacy is good  Options for supplementation reviewed  bp is good  Reviewed lab work from 1/20 - psa normal/stable at 0.5  He is due for lab work for hormone levels   LDL 85 on 1/20 as well  Due for cbc in addition  Is not able to give shots at home- is getting shots in office    No Known Allergies    Current Outpatient Medications:   •  atenolol (TENORMIN) 25 MG tablet, Take 1 tablet by mouth Daily., Disp: 90 tablet, Rfl: 1  •  FLUoxetine (PROzac) 20 MG capsule, Take 1 capsule by mouth Daily., Disp: 90 capsule, Rfl: 1  •  omeprazole (priLOSEC) 20 MG capsule, Take 1 capsule by mouth Daily., Disp: 90 capsule, Rfl: 1  •  Syringe/Needle, Disp, 21G X 1-1/2\" 3 ML misc, Use one q 2 weeks to administer testosterone injection, Disp: 12 each, Rfl: 1  •  Testosterone Enanthate 200 MG/ML solution, INJECT 0.75 ML (150 MG) INTO APPROPRIATE MUSCLE AS DIRECTED BY PERSCRIBER EVERY 14 DAYS., Disp: 5 mL, Rfl: 2    Current Facility-Administered Medications:   •  Testosterone Enanthate solution 150 mg, 150 mg, Intramuscular, Q14 Days, Roxanna Diaz MD, 150 mg at 04/06/20 0923  Patient Active Problem List    Diagnosis   • Chronic pain of left knee [M25.562, G89.29]   • Pituitary hypogonadism (CMS/HCC) [E23.0]   • Asthma [J45.909]   • GERD (gastroesophageal reflux disease) [K21.9]   • Panic attack as reaction to stress [F41.0, F43.0]   • Reaction, situational, acute, to stress [F43.0]   • Shortness of breath [R06.02]   • UTI (urinary tract infection) [N39.0]   • Benign paroxysmal " positional vertigo [H81.10]   • Eustachian tube dysfunction [H69.80]     Review of Systems   Constitutional: Negative for activity change, fatigue and fever.   HENT: Negative for congestion and rhinorrhea.    Eyes: Negative for pain and visual disturbance.   Respiratory: Negative for cough and shortness of breath.    Cardiovascular: Negative for chest pain and leg swelling.   Genitourinary:        Hypogonad hypogonadism- on testosterone supplementation with good effect currently   Discussed side effect of medication as infertility and he understands he will need to switch to alternative form of supplementation in order to have a child    Musculoskeletal: Negative for arthralgias and myalgias.   Psychiatric/Behavioral: Negative for behavioral problems.     Social History     Socioeconomic History   • Marital status:      Spouse name: Not on file   • Number of children: Not on file   • Years of education: Not on file   • Highest education level: Not on file   Tobacco Use   • Smoking status: Former Smoker     Packs/day: 0.00     Years: 0.00     Pack years: 0.00     Start date: 2015     Last attempt to quit: 2016     Years since quittin.2   • Smokeless tobacco: Never Used   • Tobacco comment: Budget   Substance and Sexual Activity   • Alcohol use: No   • Drug use: No   • Sexual activity: Never     No family history on file.  There were no vitals taken for this visit.  Physical Exam   Constitutional: He is oriented to person, place, and time. He appears well-developed and well-nourished.   HENT:   Head: Normocephalic.   Eyes: Pupils are equal, round, and reactive to light.   Neck: Normal range of motion.   Pulmonary/Chest: Effort normal. No respiratory distress.   Neurological: He is alert and oriented to person, place, and time.   Psychiatric: He has a normal mood and affect. His behavior is normal. Judgment and thought content normal.     Results for orders placed or performed in visit on 20   PSA  Screen   Result Value Ref Range    PSA 0.541 0.000 - 4.000 ng/mL   Vitamin D 25 Hydroxy   Result Value Ref Range    25 Hydroxy, Vitamin D 44.7 30.0 - 100.0 ng/ml   Vitamin B12   Result Value Ref Range    Vitamin B-12 1,101 (H) 211 - 946 pg/mL   Lipid Panel   Result Value Ref Range    Total Cholesterol 143 0 - 200 mg/dL    Triglycerides 70 0 - 150 mg/dL    HDL Cholesterol 44 40 - 60 mg/dL    LDL Cholesterol  85 0 - 100 mg/dL    VLDL Cholesterol 14 5 - 40 mg/dL    LDL/HDL Ratio 1.93    Hepatic Function Panel   Result Value Ref Range    Total Protein 8.1 6.0 - 8.5 g/dL    Albumin 4.80 3.50 - 5.20 g/dL    ALT (SGPT) 20 1 - 41 U/L    AST (SGOT) 23 1 - 40 U/L    Alkaline Phosphatase 53 39 - 117 U/L    Total Bilirubin 1.7 (H) 0.2 - 1.2 mg/dL    Bilirubin, Direct 0.3 0.2 - 0.3 mg/dL    Bilirubin, Indirect 1.4 mg/dL     Problem List Items Addressed This Visit        Endocrine    Pituitary hypogonadism (CMS/HCC) - Primary     On 150 mg testosterone every 2 weeks  Continue supplementation   Check cbc and total and free T   Recent psa reviewed and was normal  bp is good  Cholesterol is good as well  No changes otherwise  F/u 6 months              Return in about 6 months (around 10/24/2020) for Recheck.    Roxanna Diaz MD  Signed Roxanna Diaz MD

## 2020-04-30 ENCOUNTER — CLINICAL SUPPORT (OUTPATIENT)
Dept: ENDOCRINOLOGY | Facility: CLINIC | Age: 25
End: 2020-04-30

## 2020-04-30 ENCOUNTER — APPOINTMENT (OUTPATIENT)
Dept: LAB | Facility: HOSPITAL | Age: 25
End: 2020-04-30

## 2020-04-30 DIAGNOSIS — E23.0 HYPOGONADOTROPIC HYPOGONADISM (HCC): Primary | ICD-10-CM

## 2020-04-30 LAB
BASOPHILS # BLD AUTO: 0.07 10*3/MM3 (ref 0–0.2)
BASOPHILS NFR BLD AUTO: 1 % (ref 0–1.5)
DEPRECATED RDW RBC AUTO: 39.4 FL (ref 37–54)
EOSINOPHIL # BLD AUTO: 0.18 10*3/MM3 (ref 0–0.4)
EOSINOPHIL NFR BLD AUTO: 2.6 % (ref 0.3–6.2)
ERYTHROCYTE [DISTWIDTH] IN BLOOD BY AUTOMATED COUNT: 12.1 % (ref 12.3–15.4)
HCT VFR BLD AUTO: 46.3 % (ref 37.5–51)
HGB BLD-MCNC: 16.1 G/DL (ref 13–17.7)
IMM GRANULOCYTES # BLD AUTO: 0.01 10*3/MM3 (ref 0–0.05)
IMM GRANULOCYTES NFR BLD AUTO: 0.1 % (ref 0–0.5)
LYMPHOCYTES # BLD AUTO: 3.23 10*3/MM3 (ref 0.7–3.1)
LYMPHOCYTES NFR BLD AUTO: 46.5 % (ref 19.6–45.3)
MCH RBC QN AUTO: 30.7 PG (ref 26.6–33)
MCHC RBC AUTO-ENTMCNC: 34.8 G/DL (ref 31.5–35.7)
MCV RBC AUTO: 88.2 FL (ref 79–97)
MONOCYTES # BLD AUTO: 0.6 10*3/MM3 (ref 0.1–0.9)
MONOCYTES NFR BLD AUTO: 8.6 % (ref 5–12)
NEUTROPHILS # BLD AUTO: 2.85 10*3/MM3 (ref 1.7–7)
NEUTROPHILS NFR BLD AUTO: 41.2 % (ref 42.7–76)
NRBC BLD AUTO-RTO: 0 /100 WBC (ref 0–0.2)
PLATELET # BLD AUTO: 179 10*3/MM3 (ref 140–450)
PMV BLD AUTO: 12.2 FL (ref 6–12)
RBC # BLD AUTO: 5.25 10*6/MM3 (ref 4.14–5.8)
TESTOST SERPL-MCNC: 179 NG/DL (ref 249–836)
WBC NRBC COR # BLD: 6.94 10*3/MM3 (ref 3.4–10.8)

## 2020-04-30 PROCEDURE — 85025 COMPLETE CBC W/AUTO DIFF WBC: CPT | Performed by: INTERNAL MEDICINE

## 2020-04-30 PROCEDURE — 84403 ASSAY OF TOTAL TESTOSTERONE: CPT | Performed by: INTERNAL MEDICINE

## 2020-04-30 PROCEDURE — 84402 ASSAY OF FREE TESTOSTERONE: CPT | Performed by: INTERNAL MEDICINE

## 2020-04-30 NOTE — PROGRESS NOTES
Pt given Testosterone injection 0.75 ml R Marya. Pt handled the injection well and instructed he has one more dose in his MDV before he will need to  the next refill. Pt understood.

## 2020-05-02 LAB — TESTOST FREE SERPL-MCNC: 9.1 PG/ML (ref 9.3–26.5)

## 2020-05-28 ENCOUNTER — CLINICAL SUPPORT (OUTPATIENT)
Dept: ENDOCRINOLOGY | Facility: CLINIC | Age: 25
End: 2020-05-28

## 2020-05-28 DIAGNOSIS — E23.0 PITUITARY HYPOGONADISM (HCC): ICD-10-CM

## 2020-05-28 PROCEDURE — 96372 THER/PROPH/DIAG INJ SC/IM: CPT | Performed by: INTERNAL MEDICINE

## 2020-06-09 ENCOUNTER — DOCUMENTATION (OUTPATIENT)
Dept: ENDOCRINOLOGY | Facility: CLINIC | Age: 25
End: 2020-06-09

## 2020-06-09 NOTE — PROGRESS NOTES
05/28/20 - Patient was given 0.75 ml of Testosterone injection, on the R side of the Gluteus Lion muscle.    Patient tolerated injection well.

## 2020-07-11 ENCOUNTER — OFFICE VISIT (OUTPATIENT)
Dept: INTERNAL MEDICINE | Facility: CLINIC | Age: 25
End: 2020-07-11

## 2020-07-11 VITALS
WEIGHT: 160 LBS | TEMPERATURE: 98 F | HEIGHT: 69 IN | HEART RATE: 71 BPM | BODY MASS INDEX: 23.7 KG/M2 | OXYGEN SATURATION: 98 % | DIASTOLIC BLOOD PRESSURE: 68 MMHG | SYSTOLIC BLOOD PRESSURE: 130 MMHG

## 2020-07-11 DIAGNOSIS — R00.2 HEART PALPITATIONS: ICD-10-CM

## 2020-07-11 DIAGNOSIS — L23.7 POISON IVY DERMATITIS: ICD-10-CM

## 2020-07-11 DIAGNOSIS — F41.1 GENERALIZED ANXIETY DISORDER: Primary | ICD-10-CM

## 2020-07-11 DIAGNOSIS — K21.9 GASTROESOPHAGEAL REFLUX DISEASE WITHOUT ESOPHAGITIS: ICD-10-CM

## 2020-07-11 DIAGNOSIS — R01.1 MURMUR, CARDIAC: ICD-10-CM

## 2020-07-11 PROCEDURE — 99214 OFFICE O/P EST MOD 30 MIN: CPT | Performed by: NURSE PRACTITIONER

## 2020-07-11 RX ORDER — OMEPRAZOLE 20 MG/1
20 CAPSULE, DELAYED RELEASE ORAL DAILY
Qty: 90 CAPSULE | Refills: 1 | Status: SHIPPED | OUTPATIENT
Start: 2020-07-11 | End: 2021-01-15 | Stop reason: SDUPTHER

## 2020-07-11 RX ORDER — ATENOLOL 25 MG/1
25 TABLET ORAL 2 TIMES DAILY
Qty: 180 TABLET | Refills: 1 | Status: SHIPPED | OUTPATIENT
Start: 2020-07-11 | End: 2021-01-15 | Stop reason: SDUPTHER

## 2020-07-11 RX ORDER — FLUOXETINE HYDROCHLORIDE 20 MG/1
20 CAPSULE ORAL DAILY
Qty: 90 CAPSULE | Refills: 1 | Status: SHIPPED | OUTPATIENT
Start: 2020-07-11 | End: 2021-01-15 | Stop reason: SDUPTHER

## 2020-07-11 RX ORDER — TRIAMCINOLONE ACETONIDE 1 MG/G
CREAM TOPICAL 2 TIMES DAILY
Qty: 30 G | Refills: 0 | Status: SHIPPED | OUTPATIENT
Start: 2020-07-11 | End: 2021-08-18

## 2020-07-11 NOTE — PROGRESS NOTES
Chief Complaint   Patient presents with   • Anxiety   • Palpitations   • Heartburn   • Poison Ivy       History of Present Illness  25 y.o.male presents for anxiety, palpitations, gerd, and poison ivy..    Anxiety   Presents for follow-up visit. Symptoms include nervous/anxious behavior and palpitations. Patient reports no chest pain, depressed mood, dizziness, nausea or shortness of breath. Symptoms occur occasionally. The severity of symptoms is mild.     His past medical history is significant for anxiety/panic attacks. There is no history of hyperthyroidism. Compliance with medications is %.   Palpitations    This is a recurrent (had not taken atenolol for several months; but had to start taking agin in last 2 days with worsening sx.) problem. Episode onset: 2 days. The problem occurs 2 to 4 times per day. Progression since onset: atenolol helps some but does not last all day. Associated symptoms include anxiety. Pertinent negatives include no chest pain, coughing, dizziness, fever, nausea, near-syncope, shortness of breath or weakness. Treatments tried: atenolol once daily. The treatment provided mild relief. His past medical history is significant for anxiety. There is no history of hyperthyroidism.   Heartburn   He complains of heartburn. He reports no chest pain, no choking, no coughing, no dysphagia, no early satiety or no nausea. This is a chronic problem. The current episode started more than 1 year ago. The problem occurs occasionally. The problem has been gradually improving. The heartburn is of mild intensity. Pertinent negatives include no anemia, fatigue or weight loss. He has tried a PPI for the symptoms. The treatment provided moderate relief.   Poison Ivy   This is a new problem. The current episode started in the past 7 days. The problem has been waxing and waning since onset. The affected locations include the left lower leg and right lower leg. The rash is characterized by itchiness and  redness. He was exposed to plant contact. Pertinent negatives include no cough, eye pain, fatigue, fever or shortness of breath.     Pt denies any prior hx of cardiac murmur.    Review of Systems   Constitutional: Negative for chills, fatigue, fever and unexpected weight loss.   Eyes: Negative for pain.   Respiratory: Negative for cough, choking and shortness of breath.    Cardiovascular: Positive for palpitations. Negative for chest pain, leg swelling and near-syncope.   Gastrointestinal: Negative for dysphagia and nausea.   Neurological: Negative for dizziness and weakness.   Psychiatric/Behavioral: Negative for depressed mood. The patient is nervous/anxious.          [facility]  The following portions of the patient's history were reviewed and updated as appropriate: allergies, current medications, past family history, past medical history, past social history, past surgical history and problem list.     Past Medical History:   Diagnosis Date   • Asthma    • GERD (gastroesophageal reflux disease)    • Low testosterone in male    • Palpitations 2020   • Panic attack as reaction to stress 2016    Last Impression: 12 May 2015  Adv. to start atenolol tonight, and then after 2 days use     prn.Take vistaril to sleep, so that he gets 8-9 hrs rest.cont prozac at current dose, as he     has dizziness with med change.  Bre Bull (Internal Medicine)      Past Surgical History:   Procedure Laterality Date   • APPENDECTOMY        No Known Allergies   Social History     Socioeconomic History   • Marital status:    • Smoking status: Former Smoker     Packs/day: 0.00     Years: 0.00     Pack years: 0.00     Start date: 2015     Last attempt to quit: 2016     Years since quittin.4   • Smokeless tobacco: Never Used   • Tobacco comment: Budget   Substance and Sexual Activity   • Alcohol use: No   • Drug use: No   • Sexual activity: Never     History reviewed. No pertinent family history.         Current  "Outpatient Medications:   •  atenolol (TENORMIN) 25 MG tablet, Take 1 tablet by mouth Daily., Disp: 90 tablet, Rfl: 1  •  FLUoxetine (PROzac) 20 MG capsule, Take 1 capsule by mouth Daily., Disp: 90 capsule, Rfl: 1  •  omeprazole (priLOSEC) 20 MG capsule, Take 1 capsule by mouth Daily., Disp: 90 capsule, Rfl: 1  •  Syringe/Needle, Disp, 21G X 1-1/2\" 3 ML misc, Use one q 2 weeks to administer testosterone injection, Disp: 12 each, Rfl: 1  •  Testosterone Enanthate 200 MG/ML solution, INJECT 0.75 ML (150 MG) INTO APPROPRIATE MUSCLE AS DIRECTED BY PERSCRIBER EVERY 14 DAYS., Disp: 5 mL, Rfl: 2    Current Facility-Administered Medications:   •  Testosterone Enanthate solution 150 mg, 150 mg, Intramuscular, Q14 Days, Roxanna Diaz MD, 150 mg at 04/06/20 0923    VITALS:  /80   Pulse 71   Temp 98 °F (36.7 °C)   Ht 175.3 cm (69\")   Wt 72.6 kg (160 lb)   SpO2 98%   BMI 23.63 kg/m²     Physical Exam   Constitutional: He is oriented to person, place, and time. He appears well-developed and well-nourished. No distress.   HENT:   Head: Normocephalic.   Eyes: Pupils are equal, round, and reactive to light.   Cardiovascular: Normal rate, regular rhythm, S1 normal and S2 normal.   Murmur heard.  No edema   Pulmonary/Chest: Effort normal and breath sounds normal. No respiratory distress.   Neurological: He is alert and oriented to person, place, and time.   Skin: Skin is warm and dry. Rash noted.   Scattered raised red rash on bilateral lower shin areas   Psychiatric: He has a normal mood and affect. His behavior is normal.   Vitals reviewed.      LABS  No new labs      ASSESSMENT/PLAN  Ameen was seen today for anxiety, palpitations, heartburn and poison ivy.    Diagnoses and all orders for this visit:    Generalized anxiety disorder  Comments:  controlled  Orders:  -     FLUoxetine (PROzac) 20 MG capsule; Take 1 capsule by mouth Daily.    Heart palpitations  -     atenolol (TENORMIN) 25 MG tablet; Take 1 tablet " by mouth 2 (two) times a day.  Can take 2 times per day if having increased palpitations.    Avoid caffeine products.   Check echo.  Murmur, cardiac  -     Adult Transthoracic Echo Complete W/ Cont if Necessary Per Protocol; Future    Gastroesophageal reflux disease without esophagitis  -     omeprazole (priLOSEC) 20 MG capsule; Take 1 capsule by mouth Daily.  controlled  Poison ivy dermatitis  -     triamcinolone (KENALOG) 0.1 % cream; Apply  topically to the appropriate area as directed 2 (Two) Times a Day.  Non vesicular; treat with topical steroids. Can also try oral antihistamines to help with itching.      I discussed the patients findings and my recommendations with patient.  Patient was encouraged to keep me informed of any acute changes, lack of improvement, or any new concerning symptoms.  Patient voiced understanding of all instructions and denied further questions.      FOLLOW-UP  Return in about 6 months (around 1/11/2021), or if symptoms worsen or fail to improve, for Recheck with pcp.    Electronically signed by:    BECCA Meneses  07/11/2020    EMR Dragon/Transcription Disclaimer:  Much of this encounter note is an electronic transcription/translation of spoken language to printed text.  The electronic translation of spoken language may permit erroneous, or at times, nonsensical words or phrases to be inadvertently transcribed.  Although I have reviewed the note for such errors, some may still exist

## 2020-07-13 PROBLEM — R00.2 PALPITATIONS: Status: ACTIVE | Noted: 2020-07-13

## 2020-07-27 ENCOUNTER — APPOINTMENT (OUTPATIENT)
Dept: PREADMISSION TESTING | Facility: HOSPITAL | Age: 25
End: 2020-07-27

## 2020-08-06 ENCOUNTER — APPOINTMENT (OUTPATIENT)
Dept: CARDIOLOGY | Facility: HOSPITAL | Age: 25
End: 2020-08-06

## 2020-08-06 ENCOUNTER — HOSPITAL ENCOUNTER (OUTPATIENT)
Dept: CARDIOLOGY | Facility: HOSPITAL | Age: 25
Discharge: HOME | End: 2020-08-06
Admitting: NURSE PRACTITIONER

## 2020-08-06 VITALS — BODY MASS INDEX: 24.88 KG/M2 | HEIGHT: 69 IN | WEIGHT: 168 LBS

## 2020-08-06 DIAGNOSIS — R01.1 MURMUR, CARDIAC: ICD-10-CM

## 2020-08-06 LAB
BH CV ECHO MEAS - AO ROOT AREA (BSA CORRECTED): 1.3
BH CV ECHO MEAS - AO ROOT AREA: 4.5 CM^2
BH CV ECHO MEAS - AO ROOT DIAM: 2.4 CM
BH CV ECHO MEAS - ASC AORTA: 2.1 CM
BH CV ECHO MEAS - BSA(HAYCOCK): 1.9 M^2
BH CV ECHO MEAS - BSA: 1.9 M^2
BH CV ECHO MEAS - BZI_BMI: 24.8 KILOGRAMS/M^2
BH CV ECHO MEAS - BZI_METRIC_HEIGHT: 175.3 CM
BH CV ECHO MEAS - BZI_METRIC_WEIGHT: 76.2 KG
BH CV ECHO MEAS - EDV(CUBED): 125.8 ML
BH CV ECHO MEAS - EDV(MOD-SP2): 80 ML
BH CV ECHO MEAS - EDV(MOD-SP4): 80 ML
BH CV ECHO MEAS - EDV(TEICH): 118.8 ML
BH CV ECHO MEAS - EF(CUBED): 73.5 %
BH CV ECHO MEAS - EF(MOD-BP): 63 %
BH CV ECHO MEAS - EF(MOD-SP2): 65 %
BH CV ECHO MEAS - EF(MOD-SP4): 61.3 %
BH CV ECHO MEAS - EF(TEICH): 65 %
BH CV ECHO MEAS - ESV(CUBED): 33.4 ML
BH CV ECHO MEAS - ESV(MOD-SP2): 28 ML
BH CV ECHO MEAS - ESV(MOD-SP4): 31 ML
BH CV ECHO MEAS - ESV(TEICH): 41.6 ML
BH CV ECHO MEAS - FS: 35.7 %
BH CV ECHO MEAS - IVS/LVPW: 1
BH CV ECHO MEAS - IVSD: 0.88 CM
BH CV ECHO MEAS - LA DIMENSION: 3.8 CM
BH CV ECHO MEAS - LA/AO: 1.6
BH CV ECHO MEAS - LAD MAJOR: 5.4 CM
BH CV ECHO MEAS - LAT PEAK E' VEL: 13.9 CM/SEC
BH CV ECHO MEAS - LATERAL E/E' RATIO: 7.3
BH CV ECHO MEAS - LV DIASTOLIC VOL/BSA (35-75): 41.7 ML/M^2
BH CV ECHO MEAS - LV MASS(C)D: 153 GRAMS
BH CV ECHO MEAS - LV MASS(C)DI: 79.8 GRAMS/M^2
BH CV ECHO MEAS - LV SYSTOLIC VOL/BSA (12-30): 16.2 ML/M^2
BH CV ECHO MEAS - LVIDD: 5 CM
BH CV ECHO MEAS - LVIDS: 3.2 CM
BH CV ECHO MEAS - LVLD AP2: 7.9 CM
BH CV ECHO MEAS - LVLD AP4: 7.4 CM
BH CV ECHO MEAS - LVLS AP2: 7.1 CM
BH CV ECHO MEAS - LVLS AP4: 6.9 CM
BH CV ECHO MEAS - LVPWD: 0.87 CM
BH CV ECHO MEAS - MED PEAK E' VEL: 10.6 CM/SEC
BH CV ECHO MEAS - MEDIAL E/E' RATIO: 9.6
BH CV ECHO MEAS - MV A MAX VEL: 50.8 CM/SEC
BH CV ECHO MEAS - MV DEC SLOPE: 394 CM/SEC^2
BH CV ECHO MEAS - MV DEC TIME: 0.16 SEC
BH CV ECHO MEAS - MV E MAX VEL: 102 CM/SEC
BH CV ECHO MEAS - MV E/A: 2
BH CV ECHO MEAS - MV P1/2T MAX VEL: 121 CM/SEC
BH CV ECHO MEAS - MV P1/2T: 89.9 MSEC
BH CV ECHO MEAS - MVA P1/2T LCG: 1.8 CM^2
BH CV ECHO MEAS - MVA(P1/2T): 2.4 CM^2
BH CV ECHO MEAS - PA ACC SLOPE: 637.5 CM/SEC^2
BH CV ECHO MEAS - PA ACC TIME: 0.16 SEC
BH CV ECHO MEAS - PA PR(ACCEL): 5.4 MMHG
BH CV ECHO MEAS - PULM DIAS VEL: 52.8 CM/SEC
BH CV ECHO MEAS - PULM S/D: 1.1
BH CV ECHO MEAS - PULM SYS VEL: 59.7 CM/SEC
BH CV ECHO MEAS - RAP SYSTOLE: 3 MMHG
BH CV ECHO MEAS - RVSP: 29 MMHG
BH CV ECHO MEAS - SI(CUBED): 48.2 ML/M^2
BH CV ECHO MEAS - SI(MOD-SP2): 27.1 ML/M^2
BH CV ECHO MEAS - SI(MOD-SP4): 25.5 ML/M^2
BH CV ECHO MEAS - SI(TEICH): 40.3 ML/M^2
BH CV ECHO MEAS - SV(CUBED): 92.4 ML
BH CV ECHO MEAS - SV(MOD-SP2): 52 ML
BH CV ECHO MEAS - SV(MOD-SP4): 49 ML
BH CV ECHO MEAS - SV(TEICH): 77.2 ML
BH CV ECHO MEAS - TAPSE (>1.6): 2.7 CM2
BH CV ECHO MEAS - TR MAX PG: 26 MMHG
BH CV ECHO MEAS - TR MAX VEL: 257 CM/SEC
BH CV ECHO MEASUREMENTS AVERAGE E/E' RATIO: 8.33
BH CV VAS BP RIGHT ARM: NORMAL MMHG
BH CV XLRA - RV BASE: 2.8 CM
BH CV XLRA - RV LENGTH: 6.1 CM
BH CV XLRA - RV MID: 2.7 CM
BH CV XLRA - TDI S': 14.4 CM/SEC
LEFT ATRIUM VOLUME INDEX: 24.5 ML/M^2
LEFT ATRIUM VOLUME: 47 ML
LV EF 2D ECHO EST: 60 %
MAXIMAL PREDICTED HEART RATE: 193 BPM
STRESS TARGET HR: 164 BPM

## 2020-08-06 PROCEDURE — 93306 TTE W/DOPPLER COMPLETE: CPT | Performed by: INTERNAL MEDICINE

## 2020-08-06 PROCEDURE — 93306 TTE W/DOPPLER COMPLETE: CPT

## 2020-08-12 DIAGNOSIS — E23.0 HYPOGONADOTROPIC HYPOGONADISM (HCC): ICD-10-CM

## 2020-08-13 NOTE — TELEPHONE ENCOUNTER
Dr. Diaz, please advise on refill.  Thank you.    LOV:  10/28/19    Last refill:  12/10/19  Q.  5 mL  R.  2

## 2020-08-14 ENCOUNTER — APPOINTMENT (OUTPATIENT)
Dept: CARDIOLOGY | Facility: HOSPITAL | Age: 25
End: 2020-08-14

## 2020-08-17 DIAGNOSIS — E23.0 HYPOGONADOTROPIC HYPOGONADISM (HCC): ICD-10-CM

## 2020-08-17 RX ORDER — TESTOSTERONE ENANTHATE 200 MG/ML
INJECTION, SOLUTION INTRAMUSCULAR
Qty: 5 ML | Refills: 0 | Status: CANCELLED | OUTPATIENT
Start: 2020-08-17

## 2020-08-17 RX ORDER — TESTOSTERONE ENANTHATE 200 MG/ML
INJECTION, SOLUTION INTRAMUSCULAR
Qty: 5 ML | Refills: 0 | Status: SHIPPED | OUTPATIENT
Start: 2020-08-17 | End: 2020-10-22 | Stop reason: SDUPTHER

## 2020-08-21 ENCOUNTER — TELEPHONE (OUTPATIENT)
Dept: ENDOCRINOLOGY | Facility: CLINIC | Age: 25
End: 2020-08-21

## 2020-10-22 ENCOUNTER — OFFICE VISIT (OUTPATIENT)
Dept: ENDOCRINOLOGY | Facility: CLINIC | Age: 25
End: 2020-10-22

## 2020-10-22 VITALS
WEIGHT: 165.4 LBS | HEART RATE: 80 BPM | OXYGEN SATURATION: 99 % | TEMPERATURE: 98 F | SYSTOLIC BLOOD PRESSURE: 124 MMHG | HEIGHT: 69 IN | BODY MASS INDEX: 24.5 KG/M2 | DIASTOLIC BLOOD PRESSURE: 72 MMHG

## 2020-10-22 DIAGNOSIS — E23.0 PITUITARY HYPOGONADISM (HCC): Primary | ICD-10-CM

## 2020-10-22 DIAGNOSIS — G89.29 CHRONIC PAIN OF LEFT KNEE: ICD-10-CM

## 2020-10-22 DIAGNOSIS — R00.2 PALPITATIONS: ICD-10-CM

## 2020-10-22 DIAGNOSIS — E23.0 HYPOGONADOTROPIC HYPOGONADISM (HCC): ICD-10-CM

## 2020-10-22 DIAGNOSIS — M25.562 CHRONIC PAIN OF LEFT KNEE: ICD-10-CM

## 2020-10-22 PROCEDURE — 99214 OFFICE O/P EST MOD 30 MIN: CPT | Performed by: INTERNAL MEDICINE

## 2020-10-22 RX ORDER — TESTOSTERONE ENANTHATE 200 MG/ML
200 INJECTION, SOLUTION INTRAMUSCULAR
Qty: 10 ML | Refills: 1 | Status: SHIPPED | OUTPATIENT
Start: 2020-10-22 | End: 2021-04-26 | Stop reason: SDUPTHER

## 2020-10-22 NOTE — PROGRESS NOTES
"Blade CASSIDY [facility] 25 y.o.  CC:Follow-up and Hypogonadism    [place]: Follow-up and Hypogonadism    bp is good  A lot of fatigue- recent levels reviewed and are low   He is taking 150 mg every 2 weeks of testosterone injection  Discussed raising dose to 200 mg every 2 weeks  No polycythemia, normal PSA  He is in his last year of school- considering having a baby after finishing  A lot of stress at work- drinking a lot of caffeine and was having palpitations  Had echo - reviewed with him - overall normal  Also saw Dr Pritchett for knee pain and crepitance left knee (prior soccer injury)  Discussed MRI with him   Reviewed transition timing from testosterone    No Known Allergies    Current Outpatient Medications:   •  atenolol (TENORMIN) 25 MG tablet, Take 1 tablet by mouth 2 (two) times a day., Disp: 180 tablet, Rfl: 1  •  FLUoxetine (PROzac) 20 MG capsule, Take 1 capsule by mouth Daily., Disp: 90 capsule, Rfl: 1  •  omeprazole (priLOSEC) 20 MG capsule, Take 1 capsule by mouth Daily., Disp: 90 capsule, Rfl: 1  •  Syringe/Needle, Disp, 21G X 1-1/2\" 3 ML misc, Use one q 2 weeks to administer testosterone injection, Disp: 12 each, Rfl: 1  •  Testosterone Enanthate 200 MG/ML solution, INJECT 0.75 MLS INTO APPROPRIATE MUSCLE AS DIRECTED BY PRESCRIBER EVERY 14 DAYS, Disp: 5 mL, Rfl: 0  •  triamcinolone (KENALOG) 0.1 % cream, Apply  topically to the appropriate area as directed 2 (Two) Times a Day., Disp: 30 g, Rfl: 0  Patient Active Problem List    Diagnosis   • Palpitations [R00.2]   • Chronic pain of left knee [M25.562, G89.29]   • Pituitary hypogonadism (CMS/HCC) [E23.0]   • Asthma [J45.909]   • GERD (gastroesophageal reflux disease) [K21.9]   • Panic attack as reaction to stress [F41.0, F43.0]   • Reaction, situational, acute, to stress [F43.0]   • Eustachian tube dysfunction [H69.80]     Review of Systems   Constitutional: Positive for fatigue. Negative for activity change, appetite change, chills, diaphoresis, fever and " unexpected weight change.   HENT: Negative for congestion, dental problem, drooling, ear discharge, ear pain, facial swelling, hearing loss, mouth sores, nosebleeds, postnasal drip, rhinorrhea, sinus pressure, sneezing, sore throat, tinnitus, trouble swallowing and voice change.    Eyes: Negative for photophobia, pain, discharge, redness, itching and visual disturbance.   Respiratory: Negative for apnea, cough, choking, chest tightness, shortness of breath, wheezing and stridor.    Cardiovascular: Positive for palpitations. Negative for chest pain and leg swelling.   Gastrointestinal: Negative for abdominal distention, abdominal pain, anal bleeding, blood in stool, constipation, diarrhea, nausea, rectal pain and vomiting.   Endocrine: Negative for cold intolerance, heat intolerance, polydipsia, polyphagia and polyuria.   Genitourinary: Negative for decreased urine volume, difficulty urinating, dysuria, enuresis, flank pain, frequency, genital sores, hematuria and urgency.   Musculoskeletal: Positive for arthralgias. Negative for back pain, gait problem, joint swelling, myalgias, neck pain and neck stiffness.   Skin: Negative for color change, pallor, rash and wound.   Allergic/Immunologic: Negative for environmental allergies, food allergies and immunocompromised state.   Neurological: Negative for dizziness, tremors, seizures, syncope, facial asymmetry, speech difficulty, weakness, light-headedness, numbness and headaches.   Hematological: Negative for adenopathy. Does not bruise/bleed easily.   Psychiatric/Behavioral: Negative for agitation, behavioral problems, confusion, decreased concentration, dysphoric mood, hallucinations, self-injury, sleep disturbance and suicidal ideas. The patient is not nervous/anxious and is not hyperactive.      Social History     Socioeconomic History   • Marital status:      Spouse name: Not on file   • Number of children: Not on file   • Years of education: Not on file   •  "Highest education level: Not on file   Tobacco Use   • Smoking status: Former Smoker     Packs/day: 0.00     Years: 0.00     Pack years: 0.00     Start date: 2015     Quit date: 2016     Years since quittin.7   • Smokeless tobacco: Never Used   • Tobacco comment: Budget   Substance and Sexual Activity   • Alcohol use: No   • Drug use: No   • Sexual activity: Never     History reviewed. No pertinent family history.  /72   Pulse 80   Temp 98 °F (36.7 °C)   Ht 175.3 cm (69\")   Wt 75 kg (165 lb 6.4 oz)   SpO2 99%   BMI 24.43 kg/m²   Physical Exam  Vitals signs and nursing note reviewed.   Constitutional:       Appearance: Normal appearance. He is well-developed.   HENT:      Head: Normocephalic and atraumatic.   Eyes:      General: Lids are normal.      Extraocular Movements: Extraocular movements intact.      Conjunctiva/sclera: Conjunctivae normal.      Pupils: Pupils are equal, round, and reactive to light.   Neck:      Musculoskeletal: Normal range of motion and neck supple.      Thyroid: No thyroid mass or thyromegaly.      Vascular: No carotid bruit.      Trachea: Trachea normal. No tracheal deviation.   Cardiovascular:      Rate and Rhythm: Normal rate and regular rhythm.      Heart sounds: Normal heart sounds. No murmur. No friction rub. No gallop.    Pulmonary:      Effort: Pulmonary effort is normal. No respiratory distress.      Breath sounds: Normal breath sounds. No wheezing.   Musculoskeletal: Normal range of motion.         General: Tenderness (left knee ) present. No deformity.   Lymphadenopathy:      Cervical: No cervical adenopathy.   Skin:     General: Skin is warm and dry.      Findings: No erythema or rash.      Nails: There is no clubbing.     Neurological:      Mental Status: He is alert and oriented to person, place, and time.      Cranial Nerves: No cranial nerve deficit.      Deep Tendon Reflexes: Reflexes are normal and symmetric. Reflexes normal.   Psychiatric:         " Speech: Speech normal.         Behavior: Behavior normal.         Thought Content: Thought content normal.         Judgment: Judgment normal.       Results for orders placed or performed during the hospital encounter of 08/06/20   Adult Transthoracic Echo Complete W/ Cont if Necessary Per Protocol   Result Value Ref Range    BSA 1.9 m^2    IVSd 0.88 cm    LVIDd 5.0 cm    LVIDs 3.2 cm    LVPWd 0.87 cm    IVS/LVPW 1.0     FS 35.7 %    EDV(Teich) 118.8 ml    ESV(Teich) 41.6 ml    EF(Teich) 65.0 %    EDV(cubed) 125.8 ml    ESV(cubed) 33.4 ml    EF(cubed) 73.5 %    LV mass(C)d 153.0 grams    LV mass(C)dI 79.8 grams/m^2    SV(Teich) 77.2 ml    SI(Teich) 40.3 ml/m^2    SV(cubed) 92.4 ml    SI(cubed) 48.2 ml/m^2    Ao root diam 2.4 cm    Ao root area 4.5 cm^2    LA dimension 3.8 cm    asc Aorta Diam 2.1 cm    LA/Ao 1.6     LAd major 5.4 cm    LVLd ap4 7.4 cm    EDV(MOD-sp4) 80.0 ml    LVLs ap4 6.9 cm    ESV(MOD-sp4) 31.0 ml    EF(MOD-sp4) 61.3 %    LVLd ap2 7.9 cm    EDV(MOD-sp2) 80.0 ml    LVLs ap2 7.1 cm    ESV(MOD-sp2) 28.0 ml    EF(MOD-sp2) 65.0 %    LA volume 47.0 ml    EF(MOD-bp) 63.0 %    SV(MOD-sp4) 49.0 ml    SI(MOD-sp4) 25.5 ml/m^2    SV(MOD-sp2) 52.0 ml    SI(MOD-sp2) 27.1 ml/m^2    Ao root area (BSA corrected) 1.3     LV Paz Vol (BSA corrected) 41.7 ml/m^2    LV Sys Vol (BSA corrected) 16.2 ml/m^2    LA Volume Index 24.5 ml/m^2    MV E max salvatore 102.0 cm/sec    MV A max salvatore 50.8 cm/sec    MV E/A 2.0     MV P1/2t max salvatore 121.0 cm/sec    MV P1/2t 89.9 msec    MVA(P1/2t) 2.4 cm^2    MV dec slope 394.0 cm/sec^2    MV dec time 0.16 sec    PA acc slope 637.5 cm/sec^2    PA acc time 0.16 sec    TR max salvatore 257.0 cm/sec    TR max PG 26.0 mmHg    PA pr(Accel) 5.4 mmHg    Pulm Sys Salvatore 59.7 cm/sec    Pulm Paz Salvatore 52.8 cm/sec    Pulm S/D 1.1     MVA P1/2T LCG 1.8 cm^2    Lat E/e'  7.3     Med E/e' 9.6     Lat Peak E' Salvatore 13.9 cm/sec    Med Peak E' Salvatore 10.6 cm/sec    BH CV ECHO RHIANNON - BZI_BMI 24.8 kilograms/m^2     CV ECHO  RHIANNON - BSA(HAYCOCK) 1.9 m^2    BH CV ECHO RHIANNON - BZI_METRIC_WEIGHT 76.2 kg    BH CV ECHO RHIANNON - BZI_METRIC_HEIGHT 175.3 cm    Avg E/e' ratio 8.33     Target HR (85%) 164 bpm    Max. Pred. HR (100%) 193 bpm    BH CV VAS BP RIGHT /82 mmHg    RV S' 14.40 cm/sec    RV Base 2.80 cm    RV Length 6.10 cm    RV Mid 2.70 cm    RAP systole 3 mmHg    RVSP(TR) 29 mmHg    TAPSE (>1.6) 2.70 cm2    Echo EF Estimated 60 %     Problems Addressed this Visit        Cardiovascular and Mediastinum    Palpitations     Echo reviewed - normal valves, normal EF   Recommend reduce caffeine intake             Endocrine    Pituitary hypogonadism (CMS/HCC) - Primary     On testosterone 150 mg every 2 weeks  Low libido/sympomatic   Raise to 200 mg every 2 weeks   Refill sent  Considering transition off of testosterone - considering fertility  Discussed transition about 6 months prior to considering starting a family   F/u 4-6 months             Musculoskeletal and Integument    Chronic pain of left knee     Discussed MRI   Knee changes reviewed and we discussed only option for possible improvement of pain is surgery   He is using nsaids- no real benefit   He will return to Dr Pritchett when he cannot tolerate pain with ambulation           Diagnoses       Codes Comments    Pituitary hypogonadism (CMS/HCC)    -  Primary ICD-10-CM: E23.0  ICD-9-CM: 253.4     Chronic pain of left knee     ICD-10-CM: M25.562, G89.29  ICD-9-CM: 719.46, 338.29     Palpitations     ICD-10-CM: R00.2  ICD-9-CM: 785.1         Lab order created on new dosing   Has appt Dr Bull 1/21- lab work follow up at that time   Return in about 6 months (around 4/22/2021) for Recheck.    Roxanna Diaz MD  Signed Roxanna Diaz MD

## 2020-10-23 NOTE — ASSESSMENT & PLAN NOTE
On testosterone 150 mg every 2 weeks  Low libido/sympomatic   Raise to 200 mg every 2 weeks   Refill sent  Considering transition off of testosterone - considering fertility  Discussed transition about 6 months prior to considering starting a family   F/u 4-6 months

## 2020-10-23 NOTE — ASSESSMENT & PLAN NOTE
Discussed MRI   Knee changes reviewed and we discussed only option for possible improvement of pain is surgery   He is using nsaids- no real benefit   He will return to Dr Pritchett when he cannot tolerate pain with ambulation

## 2020-12-10 ENCOUNTER — CLINICAL SUPPORT (OUTPATIENT)
Dept: ENDOCRINOLOGY | Facility: CLINIC | Age: 25
End: 2020-12-10

## 2020-12-10 DIAGNOSIS — E23.0 PITUITARY HYPOGONADISM (HCC): ICD-10-CM

## 2020-12-10 DIAGNOSIS — E23.0 HYPOGONADOTROPIC HYPOGONADISM (HCC): ICD-10-CM

## 2020-12-10 PROCEDURE — 96372 THER/PROPH/DIAG INJ SC/IM: CPT | Performed by: INTERNAL MEDICINE

## 2020-12-10 RX ORDER — TESTOSTERONE ENANTHATE 200 MG/ML
200 INJECTION, SOLUTION INTRAMUSCULAR ONCE
Status: COMPLETED | OUTPATIENT
Start: 2020-12-10 | End: 2020-12-10

## 2020-12-10 RX ADMIN — TESTOSTERONE ENANTHATE 200 MG: 200 INJECTION, SOLUTION INTRAMUSCULAR at 11:35

## 2021-01-11 ENCOUNTER — TELEPHONE (OUTPATIENT)
Dept: ENDOCRINOLOGY | Facility: CLINIC | Age: 26
End: 2021-01-11

## 2021-01-13 ENCOUNTER — TELEPHONE (OUTPATIENT)
Dept: ENDOCRINOLOGY | Facility: CLINIC | Age: 26
End: 2021-01-13

## 2021-01-15 ENCOUNTER — OFFICE VISIT (OUTPATIENT)
Dept: INTERNAL MEDICINE | Facility: CLINIC | Age: 26
End: 2021-01-15

## 2021-01-15 ENCOUNTER — LAB (OUTPATIENT)
Dept: LAB | Facility: HOSPITAL | Age: 26
End: 2021-01-15

## 2021-01-15 VITALS
HEART RATE: 78 BPM | OXYGEN SATURATION: 98 % | WEIGHT: 167 LBS | HEIGHT: 69 IN | BODY MASS INDEX: 24.73 KG/M2 | SYSTOLIC BLOOD PRESSURE: 130 MMHG | DIASTOLIC BLOOD PRESSURE: 78 MMHG | TEMPERATURE: 97.3 F

## 2021-01-15 DIAGNOSIS — K21.9 GASTROESOPHAGEAL REFLUX DISEASE WITHOUT ESOPHAGITIS: ICD-10-CM

## 2021-01-15 DIAGNOSIS — S16.1XXA STRAIN OF NECK MUSCLE, INITIAL ENCOUNTER: ICD-10-CM

## 2021-01-15 DIAGNOSIS — F41.1 GENERALIZED ANXIETY DISORDER: ICD-10-CM

## 2021-01-15 DIAGNOSIS — E23.0 PITUITARY HYPOGONADISM (HCC): Primary | ICD-10-CM

## 2021-01-15 DIAGNOSIS — R00.2 HEART PALPITATIONS: ICD-10-CM

## 2021-01-15 PROCEDURE — 99214 OFFICE O/P EST MOD 30 MIN: CPT | Performed by: INTERNAL MEDICINE

## 2021-01-15 PROCEDURE — 84443 ASSAY THYROID STIM HORMONE: CPT | Performed by: INTERNAL MEDICINE

## 2021-01-15 PROCEDURE — 85025 COMPLETE CBC W/AUTO DIFF WBC: CPT | Performed by: INTERNAL MEDICINE

## 2021-01-15 PROCEDURE — 84403 ASSAY OF TOTAL TESTOSTERONE: CPT | Performed by: INTERNAL MEDICINE

## 2021-01-15 PROCEDURE — 84402 ASSAY OF FREE TESTOSTERONE: CPT | Performed by: INTERNAL MEDICINE

## 2021-01-15 PROCEDURE — 80053 COMPREHEN METABOLIC PANEL: CPT | Performed by: INTERNAL MEDICINE

## 2021-01-15 RX ORDER — CYCLOBENZAPRINE HCL 10 MG
10 TABLET ORAL NIGHTLY PRN
Qty: 30 TABLET | Refills: 2 | Status: SHIPPED | OUTPATIENT
Start: 2021-01-15 | End: 2021-11-30

## 2021-01-15 RX ORDER — ATENOLOL 25 MG/1
25 TABLET ORAL 2 TIMES DAILY
Qty: 180 TABLET | Refills: 1 | Status: SHIPPED | OUTPATIENT
Start: 2021-01-15 | End: 2023-02-23

## 2021-01-15 RX ORDER — FLUOXETINE HYDROCHLORIDE 20 MG/1
CAPSULE ORAL
Qty: 120 CAPSULE | Refills: 1 | Status: SHIPPED | OUTPATIENT
Start: 2021-01-15 | End: 2021-08-18

## 2021-01-15 RX ORDER — OMEPRAZOLE 20 MG/1
20 CAPSULE, DELAYED RELEASE ORAL DAILY
Qty: 90 CAPSULE | Refills: 1 | Status: SHIPPED | OUTPATIENT
Start: 2021-01-15 | End: 2022-01-31

## 2021-01-15 NOTE — PROGRESS NOTES
"Stress and Knee Pain (left, still having issues)    Subjective   Blade Hall is a 25 y.o. male is here today for follow-up.    History of Present Illness   Has been off testosterone shots , last was 4 weeks ago. Wants to start a family, was told by Dr. NAYAK that he could start some pills.  Left neck pain, started after he woke up from sleep.  Reports his dog  after his MIL had her spayed. Has been a wreck since then, more anxiety, not sleeping well.KEVON got him 2 puppies -peyronese.  Was advised togo for the knee surgeryby Dr. Diaz as well.  Notes his sister who is 22 has not had a period either.    Current Outpatient Medications:   •  atenolol (TENORMIN) 25 MG tablet, Take 1 tablet by mouth 2 (two) times a day., Disp: 180 tablet, Rfl: 1  •  FLUoxetine (PROzac) 20 MG capsule, Take 2 daily x 30 days then 1 daily therafter, Disp: 120 capsule, Rfl: 1  •  omeprazole (priLOSEC) 20 MG capsule, Take 1 capsule by mouth Daily., Disp: 90 capsule, Rfl: 1  •  Syringe/Needle, Disp, 21G X 1-1/2\" 3 ML misc, Use one q 2 weeks to administer testosterone injection, Disp: 12 each, Rfl: 1  •  Testosterone Enanthate 200 MG/ML solution, Inject 200 mg into the appropriate muscle as directed by prescriber Every 14 (Fourteen) Days., Disp: 10 mL, Rfl: 1  •  triamcinolone (KENALOG) 0.1 % cream, Apply  topically to the appropriate area as directed 2 (Two) Times a Day., Disp: 30 g, Rfl: 0  •  clomiPHENE (CLOMID) 50 MG tablet, 1/2 PO daily x 25 days then off x 5 days, Disp: 15 tablet, Rfl: 3  •  cyclobenzaprine (FLEXERIL) 10 MG tablet, Take 1 tablet by mouth At Night As Needed for Muscle Spasms., Disp: 30 tablet, Rfl: 2      The following portions of the patient's history were reviewed and updated as appropriate: allergies, current medications, past family history, past medical history, past social history, past surgical history and problem list.    Review of Systems   Constitutional: Negative.  Negative for chills and fever.   HENT: " "Negative for ear discharge, ear pain, sinus pressure and sore throat.    Respiratory: Negative for cough, chest tightness and shortness of breath.    Cardiovascular: Negative for chest pain, palpitations and leg swelling.   Gastrointestinal: Negative for diarrhea, nausea and vomiting.   Musculoskeletal: Positive for arthralgias (left knee), neck pain and neck stiffness. Negative for back pain and myalgias.   Neurological: Negative for dizziness, syncope and headaches.   Psychiatric/Behavioral: Positive for decreased concentration, dysphoric mood and sleep disturbance. Negative for confusion.       Objective   /78   Pulse 78   Temp 97.3 °F (36.3 °C)   Ht 175.3 cm (69\")   Wt 75.8 kg (167 lb)   SpO2 98% Comment: ra  BMI 24.66 kg/m²   Physical Exam  Vitals signs and nursing note reviewed.   Constitutional:       Appearance: He is well-developed.      Comments: Appears tired   HENT:      Head: Normocephalic and atraumatic.      Right Ear: External ear normal.      Left Ear: External ear normal.      Mouth/Throat:      Pharynx: No oropharyngeal exudate.   Eyes:      Conjunctiva/sclera: Conjunctivae normal.      Pupils: Pupils are equal, round, and reactive to light.   Neck:      Musculoskeletal: Neck supple.      Thyroid: No thyromegaly.   Cardiovascular:      Rate and Rhythm: Normal rate and regular rhythm.   Pulmonary:      Effort: Pulmonary effort is normal.      Breath sounds: Normal breath sounds.   Abdominal:      General: Bowel sounds are normal. There is no distension.      Palpations: Abdomen is soft.      Tenderness: There is no abdominal tenderness.   Musculoskeletal:         General: Tenderness present.   Skin:     General: Skin is warm and dry.   Neurological:      Mental Status: He is alert and oriented to person, place, and time.      Cranial Nerves: No cranial nerve deficit.   Psychiatric:         Judgment: Judgment normal.           Results for orders placed or performed during the hospital " encounter of 08/06/20   Adult Transthoracic Echo Complete W/ Cont if Necessary Per Protocol   Result Value Ref Range    BSA 1.9 m^2    IVSd 0.88 cm    LVIDd 5.0 cm    LVIDs 3.2 cm    LVPWd 0.87 cm    IVS/LVPW 1.0     FS 35.7 %    EDV(Teich) 118.8 ml    ESV(Teich) 41.6 ml    EF(Teich) 65.0 %    EDV(cubed) 125.8 ml    ESV(cubed) 33.4 ml    EF(cubed) 73.5 %    LV mass(C)d 153.0 grams    LV mass(C)dI 79.8 grams/m^2    SV(Teich) 77.2 ml    SI(Teich) 40.3 ml/m^2    SV(cubed) 92.4 ml    SI(cubed) 48.2 ml/m^2    Ao root diam 2.4 cm    Ao root area 4.5 cm^2    LA dimension 3.8 cm    asc Aorta Diam 2.1 cm    LA/Ao 1.6     LAd major 5.4 cm    LVLd ap4 7.4 cm    EDV(MOD-sp4) 80.0 ml    LVLs ap4 6.9 cm    ESV(MOD-sp4) 31.0 ml    EF(MOD-sp4) 61.3 %    LVLd ap2 7.9 cm    EDV(MOD-sp2) 80.0 ml    LVLs ap2 7.1 cm    ESV(MOD-sp2) 28.0 ml    EF(MOD-sp2) 65.0 %    LA volume 47.0 ml    EF(MOD-bp) 63.0 %    SV(MOD-sp4) 49.0 ml    SI(MOD-sp4) 25.5 ml/m^2    SV(MOD-sp2) 52.0 ml    SI(MOD-sp2) 27.1 ml/m^2    Ao root area (BSA corrected) 1.3     LV Paz Vol (BSA corrected) 41.7 ml/m^2    LV Sys Vol (BSA corrected) 16.2 ml/m^2    LA Volume Index 24.5 ml/m^2    MV E max salvatore 102.0 cm/sec    MV A max salvatore 50.8 cm/sec    MV E/A 2.0     MV P1/2t max salvatore 121.0 cm/sec    MV P1/2t 89.9 msec    MVA(P1/2t) 2.4 cm^2    MV dec slope 394.0 cm/sec^2    MV dec time 0.16 sec    PA acc slope 637.5 cm/sec^2    PA acc time 0.16 sec    TR max salvatore 257.0 cm/sec    TR max PG 26.0 mmHg    PA pr(Accel) 5.4 mmHg    Pulm Sys Salvatore 59.7 cm/sec    Pulm Paz Salvatore 52.8 cm/sec    Pulm S/D 1.1     MVA P1/2T LCG 1.8 cm^2    Lat E/e'  7.3     Med E/e' 9.6     Lat Peak E' Salvatore 13.9 cm/sec    Med Peak E' Salvatore 10.6 cm/sec    BH CV ECHO RHIANNON - BZI_BMI 24.8 kilograms/m^2     CV ECHO RHIANNON - BSA([facility]) 1.9 m^2     CV ECHO RHIANNON - BZI_METRIC_WEIGHT 76.2 kg     CV ECHO RHIANNON - BZI_METRIC_HEIGHT 175.3 cm    Avg E/e' ratio 8.33     Target HR (85%) 164 bpm    Max. Pred. HR (100%) 193 bpm     BH CV VAS BP RIGHT /82 mmHg    RV S' 14.40 cm/sec    RV Base 2.80 cm    RV Length 6.10 cm    RV Mid 2.70 cm    RAP systole 3 mmHg    RVSP(TR) 29 mmHg    TAPSE (>1.6) 2.70 cm2    Echo EF Estimated 60 %             Assessment/Plan   Diagnoses and all orders for this visit:    Pituitary hypogonadism (CMS/HCC)  -     clomiPHENE (CLOMID) 50 MG tablet; 1/2 PO daily x 25 days then off x 5 days    Heart palpitations  -     atenolol (TENORMIN) 25 MG tablet; Take 1 tablet by mouth 2 (two) times a day.    Generalized anxiety disorder  Comments:  worse due to loss of his pet, short term increase.  Orders:  -     FLUoxetine (PROzac) 20 MG capsule; Take 2 daily x 30 days then 1 daily therafter    Gastroesophageal reflux disease without esophagitis  -     omeprazole (priLOSEC) 20 MG capsule; Take 1 capsule by mouth Daily.    Strain of neck muscle, initial encounter  -     cyclobenzaprine (FLEXERIL) 10 MG tablet; Take 1 tablet by mouth At Night As Needed for Muscle Spasms.      Does not want to wean off testosterone.  Start clomid at previous dose, advised will notify Dr. Diaz.( Msg sent)     Per Dr. Diaz-  He will need to be referred to male fertility for sperm analysis (I don't do that)   My understanding is that the clomid may work but it will have to be assessed with sampling   If not then he will need HCG injections to induce function   I am not sure who in town does this- maybe urology? Or maybe Dr Romero at [facility] (they have a website)     Pt. To discuss this at their April appt.    Return in about 6 months (around 7/15/2021) for Annual.

## 2021-01-16 LAB
ALBUMIN SERPL-MCNC: 5.1 G/DL (ref 3.5–5.2)
ALBUMIN/GLOB SERPL: 1.7 G/DL
ALP SERPL-CCNC: 50 U/L (ref 39–117)
ALT SERPL W P-5'-P-CCNC: 53 U/L (ref 1–41)
ANION GAP SERPL CALCULATED.3IONS-SCNC: 10.4 MMOL/L (ref 5–15)
AST SERPL-CCNC: 41 U/L (ref 1–40)
BASOPHILS # BLD AUTO: 0.04 10*3/MM3 (ref 0–0.2)
BASOPHILS NFR BLD AUTO: 0.7 % (ref 0–1.5)
BILIRUB SERPL-MCNC: 0.4 MG/DL (ref 0–1.2)
BUN SERPL-MCNC: 20 MG/DL (ref 6–20)
BUN/CREAT SERPL: 28.2 (ref 7–25)
CALCIUM SPEC-SCNC: 9.2 MG/DL (ref 8.6–10.5)
CHLORIDE SERPL-SCNC: 102 MMOL/L (ref 98–107)
CO2 SERPL-SCNC: 28.6 MMOL/L (ref 22–29)
CREAT SERPL-MCNC: 0.71 MG/DL (ref 0.76–1.27)
DEPRECATED RDW RBC AUTO: 38.2 FL (ref 37–54)
EOSINOPHIL # BLD AUTO: 0.13 10*3/MM3 (ref 0–0.4)
EOSINOPHIL NFR BLD AUTO: 2.3 % (ref 0.3–6.2)
ERYTHROCYTE [DISTWIDTH] IN BLOOD BY AUTOMATED COUNT: 12 % (ref 12.3–15.4)
GFR SERPL CREATININE-BSD FRML MDRD: 133 ML/MIN/1.73
GLOBULIN UR ELPH-MCNC: 3 GM/DL
GLUCOSE SERPL-MCNC: 78 MG/DL (ref 65–99)
HCT VFR BLD AUTO: 48.1 % (ref 37.5–51)
HGB BLD-MCNC: 16.8 G/DL (ref 13–17.7)
IMM GRANULOCYTES # BLD AUTO: 0.01 10*3/MM3 (ref 0–0.05)
IMM GRANULOCYTES NFR BLD AUTO: 0.2 % (ref 0–0.5)
LYMPHOCYTES # BLD AUTO: 2.48 10*3/MM3 (ref 0.7–3.1)
LYMPHOCYTES NFR BLD AUTO: 43.7 % (ref 19.6–45.3)
MCH RBC QN AUTO: 30.5 PG (ref 26.6–33)
MCHC RBC AUTO-ENTMCNC: 34.9 G/DL (ref 31.5–35.7)
MCV RBC AUTO: 87.3 FL (ref 79–97)
MONOCYTES # BLD AUTO: 0.6 10*3/MM3 (ref 0.1–0.9)
MONOCYTES NFR BLD AUTO: 10.6 % (ref 5–12)
NEUTROPHILS NFR BLD AUTO: 2.41 10*3/MM3 (ref 1.7–7)
NEUTROPHILS NFR BLD AUTO: 42.5 % (ref 42.7–76)
NRBC BLD AUTO-RTO: 0 /100 WBC (ref 0–0.2)
PLATELET # BLD AUTO: 143 10*3/MM3 (ref 140–450)
PMV BLD AUTO: 12 FL (ref 6–12)
POTASSIUM SERPL-SCNC: 4.1 MMOL/L (ref 3.5–5.2)
PROT SERPL-MCNC: 8.1 G/DL (ref 6–8.5)
RBC # BLD AUTO: 5.51 10*6/MM3 (ref 4.14–5.8)
SODIUM SERPL-SCNC: 141 MMOL/L (ref 136–145)
TESTOST SERPL-MCNC: 246 NG/DL (ref 249–836)
TSH SERPL DL<=0.05 MIU/L-ACNC: 1.72 UIU/ML (ref 0.27–4.2)
WBC # BLD AUTO: 5.67 10*3/MM3 (ref 3.4–10.8)

## 2021-01-19 ENCOUNTER — PRIOR AUTHORIZATION (OUTPATIENT)
Dept: INTERNAL MEDICINE | Facility: CLINIC | Age: 26
End: 2021-01-19

## 2021-01-20 LAB — TESTOST FREE SERPL-MCNC: 7.3 PG/ML (ref 9.3–26.5)

## 2021-03-08 ENCOUNTER — TELEPHONE (OUTPATIENT)
Dept: INTERNAL MEDICINE | Facility: CLINIC | Age: 26
End: 2021-03-08

## 2021-04-26 ENCOUNTER — OFFICE VISIT (OUTPATIENT)
Dept: ENDOCRINOLOGY | Facility: CLINIC | Age: 26
End: 2021-04-26

## 2021-04-26 VITALS
TEMPERATURE: 97.1 F | HEIGHT: 70 IN | DIASTOLIC BLOOD PRESSURE: 80 MMHG | SYSTOLIC BLOOD PRESSURE: 140 MMHG | WEIGHT: 173.8 LBS | BODY MASS INDEX: 24.88 KG/M2

## 2021-04-26 DIAGNOSIS — E23.0 PITUITARY HYPOGONADISM (HCC): Primary | ICD-10-CM

## 2021-04-26 DIAGNOSIS — E23.0 HYPOGONADOTROPIC HYPOGONADISM (HCC): ICD-10-CM

## 2021-04-26 PROCEDURE — 99213 OFFICE O/P EST LOW 20 MIN: CPT | Performed by: INTERNAL MEDICINE

## 2021-04-26 RX ORDER — TESTOSTERONE ENANTHATE 200 MG/ML
200 INJECTION, SOLUTION INTRAMUSCULAR
Qty: 10 ML | Refills: 1 | Status: SHIPPED | OUTPATIENT
Start: 2021-04-26 | End: 2021-11-23

## 2021-04-26 NOTE — PROGRESS NOTES
"Blade [facility] 25 y.o.  [facility]: Follow-up and Hypogonadism      [place]: Follow-up and Hypogonadism    He took clomid for fertility  Wife is 5 weeks pregnant   Has ob appt this Wednesday   bp is good   He would like to go back to testosterone   Discussed male fertility clinic with him but his wife is already expecting   occ headaches    No Known Allergies    Current Outpatient Medications:   •  atenolol (TENORMIN) 25 MG tablet, Take 1 tablet by mouth 2 (two) times a day., Disp: 180 tablet, Rfl: 1  •  clomiPHENE (CLOMID) 50 MG tablet, 1/2 PO daily x 25 days then off x 5 days, Disp: 15 tablet, Rfl: 3  •  cyclobenzaprine (FLEXERIL) 10 MG tablet, Take 1 tablet by mouth At Night As Needed for Muscle Spasms., Disp: 30 tablet, Rfl: 2  •  FLUoxetine (PROzac) 20 MG capsule, Take 2 daily x 30 days then 1 daily therafter, Disp: 120 capsule, Rfl: 1  •  omeprazole (priLOSEC) 20 MG capsule, Take 1 capsule by mouth Daily., Disp: 90 capsule, Rfl: 1  •  Syringe/Needle, Disp, 21G X 1-1/2\" 3 ML misc, Use one q 2 weeks to administer testosterone injection, Disp: 12 each, Rfl: 1  •  Testosterone Enanthate 200 MG/ML solution, Inject 200 mg into the appropriate muscle as directed by prescriber Every 14 (Fourteen) Days., Disp: 10 mL, Rfl: 1  •  triamcinolone (KENALOG) 0.1 % cream, Apply  topically to the appropriate area as directed 2 (Two) Times a Day., Disp: 30 g, Rfl: 0  Patient Active Problem List    Diagnosis    • Palpitations [R00.2]    • Chronic pain of left knee [M25.562, G89.29]    • Pituitary hypogonadism (CMS/HCC) [E23.0]    • Asthma [J45.909]    • GERD (gastroesophageal reflux disease) [K21.9]    • Panic attack as reaction to stress [F41.0, F43.0]    • Reaction, situational, acute, to stress [F43.0]    • Eustachian tube dysfunction [H69.80]      Review of Systems   Constitutional: Negative for activity change, appetite change and unexpected weight change.   HENT: Negative for congestion and rhinorrhea.    Eyes: Negative for visual " disturbance.   Respiratory: Negative for cough and shortness of breath.    Cardiovascular: Negative for palpitations and leg swelling.   Gastrointestinal: Negative for constipation, diarrhea and nausea.   Genitourinary: Negative for hematuria.   Musculoskeletal: Negative for arthralgias, back pain, gait problem, joint swelling and myalgias.   Skin: Negative for color change, rash and wound.   Allergic/Immunologic: Negative for environmental allergies, food allergies and immunocompromised state.   Neurological: Negative for dizziness, weakness and light-headedness.   Psychiatric/Behavioral: Negative for confusion, decreased concentration, dysphoric mood and sleep disturbance. The patient is not nervous/anxious.      Social History     Socioeconomic History   • Marital status:      Spouse name: Not on file   • Number of children: Not on file   • Years of education: Not on file   • Highest education level: Not on file   Tobacco Use   • Smoking status: Former Smoker     Packs/day: 0.00     Years: 0.00     Pack years: 0.00     Start date: 2015     Quit date: 2016     Years since quittin.2   • Smokeless tobacco: Never Used   • Tobacco comment: Budget   Substance and Sexual Activity   • Alcohol use: No   • Drug use: No   • Sexual activity: Never     No family history on file.  There were no vitals taken for this visit.  Physical Exam  Vitals and nursing note reviewed.   Constitutional:       Appearance: Normal appearance. He is well-developed.   HENT:      Head: Normocephalic and atraumatic.   Eyes:      General: Lids are normal.      Extraocular Movements: Extraocular movements intact.      Conjunctiva/sclera: Conjunctivae normal.      Pupils: Pupils are equal, round, and reactive to light.   Neck:      Thyroid: No thyroid mass or thyromegaly.      Vascular: No carotid bruit.      Trachea: Trachea normal. No tracheal deviation.   Cardiovascular:      Rate and Rhythm: Normal rate and regular rhythm.       Pulses: Normal pulses.      Heart sounds: Normal heart sounds. No murmur heard.   No friction rub. No gallop.    Pulmonary:      Effort: Pulmonary effort is normal. No respiratory distress.      Breath sounds: Normal breath sounds. No wheezing.   Musculoskeletal:         General: No deformity. Normal range of motion.      Cervical back: Normal range of motion and neck supple.   Lymphadenopathy:      Cervical: No cervical adenopathy.   Skin:     General: Skin is warm and dry.      Findings: No erythema or rash.      Nails: There is no clubbing.   Neurological:      Mental Status: He is alert and oriented to person, place, and time.      Cranial Nerves: No cranial nerve deficit.      Deep Tendon Reflexes: Reflexes are normal and symmetric. Reflexes normal.   Psychiatric:         Speech: Speech normal.         Behavior: Behavior normal.         Thought Content: Thought content normal.         Judgment: Judgment normal.       Results for orders placed or performed in visit on 10/22/20   Comprehensive Metabolic Panel    Specimen: Blood   Result Value Ref Range    Glucose 78 65 - 99 mg/dL    BUN 20 6 - 20 mg/dL    Creatinine 0.71 (L) 0.76 - 1.27 mg/dL    Sodium 141 136 - 145 mmol/L    Potassium 4.1 3.5 - 5.2 mmol/L    Chloride 102 98 - 107 mmol/L    CO2 28.6 22.0 - 29.0 mmol/L    Calcium 9.2 8.6 - 10.5 mg/dL    Total Protein 8.1 6.0 - 8.5 g/dL    Albumin 5.10 3.50 - 5.20 g/dL    ALT (SGPT) 53 (H) 1 - 41 U/L    AST (SGOT) 41 (H) 1 - 40 U/L    Alkaline Phosphatase 50 39 - 117 U/L    Total Bilirubin 0.4 0.0 - 1.2 mg/dL    eGFR Non African Amer 133 >60 mL/min/1.73    Globulin 3.0 gm/dL    A/G Ratio 1.7 g/dL    BUN/Creatinine Ratio 28.2 (H) 7.0 - 25.0    Anion Gap 10.4 5.0 - 15.0 mmol/L   TSH    Specimen: Blood   Result Value Ref Range    TSH 1.720 0.270 - 4.200 uIU/mL   Testosterone    Specimen: Blood   Result Value Ref Range    Testosterone, Total 246.00 (L) 249.00 - 836.00 ng/dL   Testosterone Free Direct    Specimen: Blood    Result Value Ref Range    Testosterone, Free 7.3 (L) 9.3 - 26.5 pg/mL   CBC Auto Differential    Specimen: Blood   Result Value Ref Range    WBC 5.67 3.40 - 10.80 10*3/mm3    RBC 5.51 4.14 - 5.80 10*6/mm3    Hemoglobin 16.8 13.0 - 17.7 g/dL    Hematocrit 48.1 37.5 - 51.0 %    MCV 87.3 79.0 - 97.0 fL    MCH 30.5 26.6 - 33.0 pg    MCHC 34.9 31.5 - 35.7 g/dL    RDW 12.0 (L) 12.3 - 15.4 %    RDW-SD 38.2 37.0 - 54.0 fl    MPV 12.0 6.0 - 12.0 fL    Platelets 143 140 - 450 10*3/mm3    Neutrophil % 42.5 (L) 42.7 - 76.0 %    Lymphocyte % 43.7 19.6 - 45.3 %    Monocyte % 10.6 5.0 - 12.0 %    Eosinophil % 2.3 0.3 - 6.2 %    Basophil % 0.7 0.0 - 1.5 %    Immature Grans % 0.2 0.0 - 0.5 %    Neutrophils, Absolute 2.41 1.70 - 7.00 10*3/mm3    Lymphocytes, Absolute 2.48 0.70 - 3.10 10*3/mm3    Monocytes, Absolute 0.60 0.10 - 0.90 10*3/mm3    Eosinophils, Absolute 0.13 0.00 - 0.40 10*3/mm3    Basophils, Absolute 0.04 0.00 - 0.20 10*3/mm3    Immature Grans, Absolute 0.01 0.00 - 0.05 10*3/mm3    nRBC 0.0 0.0 - 0.2 /100 WBC     There are no diagnoses linked to this encounter.  Monalisa Ames MA  Signed Roxanna Diaz MD

## 2021-04-27 NOTE — ASSESSMENT & PLAN NOTE
Has been on clomid   Wife is now 5 weeks pregnant   Will send rx for clomid and testosterone   Continue on clomid until wife has first OB exam   F/u 4-6 months  Reviewed testosterone levels

## 2021-04-28 ENCOUNTER — TELEPHONE (OUTPATIENT)
Dept: ENDOCRINOLOGY | Facility: CLINIC | Age: 26
End: 2021-04-28

## 2021-04-28 NOTE — TELEPHONE ENCOUNTER
Approvedtoday  PA Case: [id number], Status: Approved, Coverage Starts on: 4/28/2021 12:00:00 AM, Coverage Ends on: 4/28/2022 12:00:00 AM.  Drug  Testosterone Enanthate 200MG/ML solution  Form  [employer] [place] Form (2017 [employer])

## 2021-06-23 ENCOUNTER — TELEPHONE (OUTPATIENT)
Dept: ORTHOPEDIC SURGERY | Facility: CLINIC | Age: 26
End: 2021-06-23

## 2021-06-23 NOTE — TELEPHONE ENCOUNTER
Caller: BABATUNDEABRAM EITAN    Relationship: SELF    Best call back number: [phone]    What form or medical record are you requesting: NOTE EXPLAINING REASON FOR SURGERY FOR LEFT KNEE    Who is requesting this form or medical record from you: EMPLOYER    How would you like to receive the form or medical records (pick-up, mail, fax): MAIL    Timeframe paperwork needed: ASAP    Additional notes: PT IS HAVING TROUBLE WITH LEFT KNEE. DISCUSSED WITH DR. HERBERT ABOUT SURGERY. EMPLOYER NEEDS TO KNOW SPECIFICS OF SURGERY TO ALLOW TIME OFF, ETC. CAN CALL PT BACK IF QUESTIONS/CONCERNS.

## 2021-06-23 NOTE — TELEPHONE ENCOUNTER
CALLED PATIENT TO LET HIM KNOW THAT HE WOULD NEED TO MAKE AN APPOINTMENT. PATIENT STATED THAT HE DID NOT WANT TO MAKE AN APPOINTMENT AT THE MOMENT.

## 2021-06-23 NOTE — TELEPHONE ENCOUNTER
Patient has not been seen by Dr. Pritchett since Nov 2019. Please call to make patient an appointment to discuss his questions.  Elizabeth

## 2021-07-17 ENCOUNTER — TELEPHONE (OUTPATIENT)
Dept: ENDOCRINOLOGY | Facility: CLINIC | Age: 26
End: 2021-07-17

## 2021-07-19 RX ORDER — SYRINGE WITH NEEDLE, 1 ML 25GX5/8"
SYRINGE, EMPTY DISPOSABLE MISCELLANEOUS
Qty: 2 EACH | Refills: 5 | Status: SHIPPED | OUTPATIENT
Start: 2021-07-19 | End: 2021-12-22

## 2021-07-22 NOTE — TELEPHONE ENCOUNTER
Pharmacist was advised to dispense the 22G needles and advise the pt to call back if he has difficulties with that size needle

## 2021-07-22 NOTE — TELEPHONE ENCOUNTER
Pharmacy called states syringe needle dosp 21G is on back order pharmacy wants to know if they can switch to  Syringe needle 20G or 22G. Please contact pharmacy [phone]

## 2021-08-04 ENCOUNTER — OFFICE VISIT (OUTPATIENT)
Dept: ORTHOPEDIC SURGERY | Facility: CLINIC | Age: 26
End: 2021-08-04

## 2021-08-04 VITALS
WEIGHT: 178 LBS | BODY MASS INDEX: 25.48 KG/M2 | HEART RATE: 79 BPM | HEIGHT: 70 IN | DIASTOLIC BLOOD PRESSURE: 81 MMHG | SYSTOLIC BLOOD PRESSURE: 156 MMHG

## 2021-08-04 DIAGNOSIS — M25.562 CHRONIC PAIN OF LEFT KNEE: Primary | ICD-10-CM

## 2021-08-04 DIAGNOSIS — G89.29 CHRONIC PAIN OF LEFT KNEE: Primary | ICD-10-CM

## 2021-08-04 PROCEDURE — 99214 OFFICE O/P EST MOD 30 MIN: CPT | Performed by: ORTHOPAEDIC SURGERY

## 2021-08-04 NOTE — PROGRESS NOTES
[facility] Note    Subjective     Chief Complaint   Patient presents with   • Follow-up     Chronic pain of left knee; last seen 11.2019        HPI    Bldae Hall is a 26 y.o. male who follows up for his left knee.     I have not seen him since 11/2019. He had a possible lateral meniscus tear noted on an MRI of his left knee at that time. He has not had anything done for his left knee. He is here today for an re-evaluation of his left knee, as he has not been in the office since 2019.    The pain is localized grossly around the knee. He denies that it continues to pop as it previously did. He attended physical therapy for a couple of months, which was not beneficial. He has returned to the gym and denies having any issues with his left knee with running. his left knee bothers him when he is walking and with prolonged standing at work. He is currently taking testosterone injections every 2 weeks for his low testosterone level. When he takes his testosterone injection, the pain resolves for 2 to 3 years. He denies wanting a knee arthroscopy at this time secondary to not having help.     He is employed at [email or web address].     I have reviewed the following portions of the patient's history and agree with: History of Present Illness and Review of Systems    Patient Active Problem List   Diagnosis   • Asthma   • GERD (gastroesophageal reflux disease)   • Panic attack as reaction to stress   • Reaction, situational, acute, to stress   • Eustachian tube dysfunction   • Pituitary hypogonadism (CMS/HCC)   • Chronic pain of left knee   • Palpitations     Past Medical History:   Diagnosis Date   • Asthma    • GERD (gastroesophageal reflux disease)    • Low testosterone in male    • Palpitations 7/13/2020   • Panic attack as reaction to stress 9/9/2016    Last Impression: 12 May 2015  Adv. to start atenolol tonight, and then after 2 days use     prn.Take vistaril to sleep, so that he gets  "8-9 hrs rest.cont prozac at current dose, as he     has dizziness with med change.  Bre Bull (Internal Medicine)      Past Surgical History:   Procedure Laterality Date   • APPENDECTOMY        History reviewed. No pertinent family history.  Social History     Socioeconomic History   • Marital status:      Spouse name: Not on file   • Number of children: Not on file   • Years of education: Not on file   • Highest education level: Not on file   Tobacco Use   • Smoking status: Former Smoker     Packs/day: 0.00     Years: 0.00     Pack years: 0.00     Start date: 2015     Quit date:      Years since quittin.5   • Smokeless tobacco: Never Used   • Tobacco comment: Budget   Vaping Use   • Vaping Use: Never used   Substance and Sexual Activity   • Alcohol use: No   • Drug use: No   • Sexual activity: Never      Current Outpatient Medications on File Prior to Visit   Medication Sig Dispense Refill   • atenolol (TENORMIN) 25 MG tablet Take 1 tablet by mouth 2 (two) times a day. 180 tablet 1   • clomiPHENE (CLOMID) 50 MG tablet 1/2 PO daily x 25 days then off x 5 days 15 tablet 0   • cyclobenzaprine (FLEXERIL) 10 MG tablet Take 1 tablet by mouth At Night As Needed for Muscle Spasms. 30 tablet 2   • FLUoxetine (PROzac) 20 MG capsule Take 2 daily x 30 days then 1 daily therafter 120 capsule 1   • omeprazole (priLOSEC) 20 MG capsule Take 1 capsule by mouth Daily. 90 capsule 1   • Syringe/Needle, Disp, (B-D 3CC LUER-RENETTA SYR 61GX2-0/2) 21G X 1-1/2\" 3 ML misc USE ONE SYRINGE EVERY 2 WEEKS TO ADMINISTER TESTOSTERONE INJECTION 2 each 5   • Testosterone Enanthate 200 MG/ML solution Inject 200 mg into the appropriate muscle as directed by prescriber Every 14 (Fourteen) Days. 10 mL 1   • triamcinolone (KENALOG) 0.1 % cream Apply  topically to the appropriate area as directed 2 (Two) Times a Day. 30 g 0     No current facility-administered medications on file prior to visit.      No Known Allergies     Review " of Systems   Constitutional: Negative for activity change, appetite change, chills, diaphoresis, fatigue, fever and unexpected weight change.   HENT: Negative for congestion, dental problem, drooling, ear discharge, ear pain, facial swelling, hearing loss, mouth sores, nosebleeds, postnasal drip, rhinorrhea, sinus pressure, sneezing, sore throat, tinnitus, trouble swallowing and voice change.    Eyes: Negative for photophobia, pain, discharge, redness, itching and visual disturbance.   Respiratory: Negative for apnea, cough, choking, chest tightness, shortness of breath, wheezing and stridor.    Cardiovascular: Negative for chest pain, palpitations and leg swelling.   Gastrointestinal: Negative for abdominal distention, abdominal pain, anal bleeding, blood in stool, constipation, diarrhea, nausea, rectal pain and vomiting.   Endocrine: Negative for cold intolerance, heat intolerance, polydipsia, polyphagia and polyuria.   Genitourinary: Negative for decreased urine volume, difficulty urinating, dysuria, enuresis, flank pain, frequency, genital sores, hematuria and urgency.   Musculoskeletal: Positive for arthralgias. Negative for back pain, gait problem, joint swelling, myalgias, neck pain and neck stiffness.   Skin: Negative for color change, pallor, rash and wound.   Allergic/Immunologic: Negative for environmental allergies, food allergies and immunocompromised state.   Neurological: Negative for dizziness, tremors, seizures, syncope, facial asymmetry, speech difficulty, weakness, light-headedness, numbness and headaches.   Hematological: Negative for adenopathy. Does not bruise/bleed easily.   Psychiatric/Behavioral: Negative for agitation, behavioral problems, confusion, decreased concentration, dysphoric mood, hallucinations, self-injury, sleep disturbance and suicidal ideas. The patient is not nervous/anxious and is not hyperactive.         Objective      Physical Exam  /81   Pulse 79   Ht 177.8 cm  "(70\")   Wt 80.7 kg (178 lb)   BMI 25.54 kg/m²     Body mass index is 25.54 kg/m².    General:   Mental Status:  Alert   Appearance: Cooperative, in no acute distress   Build and Nutrition: Well-nourished well-developed male   Orientation: Alert and oriented to person, place and time   Posture: Normal   Gait: Normal    Integument  • Left knee: No skin lesions, rash, or ecchymosis.    Lower Extremities  • Left Knee:  • Tenderness: No medial or lateral joint line tenderness.  • Swelling: None  • Effusion: Negative  • Crepitus: Soft  • Atrophy: None  • Range of motion:  • Extension: 0°  • Flexion: 140°  • Instability: No varus or valgus laxity. Negative anterior drawer.  • Deformities: None        Imaging/Studies  Imaging Results (Last 24 Hours)     Procedure Component Value Units Date/Time    XR Knee 3+ View With [place] Left [[id number]] Resulted: 08/04/21 1113     Updated: 08/04/21 1114    Narrative:      Left Knee Radiographs  Indication: left knee pain  Views: AP, lateral, and sunrise views of the left knee    Comparison: 5/31/2019    Findings:   No acute bony abnormalities.  Small irregularity at the medial border of   the medial femoral condyle on the articular surface, unchanged from   previous imaging, with no unusual bony features.            Assessment and Plan     Diagnoses and all orders for this visit:    1. Chronic pain of left knee (Primary)  -     XR Knee 3+ View With Sunrise Left  -     MRI Knee Left Without Contrast; Future        1. Chronic pain of left knee        I have reviewed my findings with the patient today. As his symptoms have changed slightly, we will obtain a repeat MRI of the left knee for further evaluation.  Previous MRI showed a small abnormality in the posterior horn of the lateral meniscus.    Return for After Imaging Study.      Transcribed from ambient dictation for Joni Pritchett MD by Michael Farr.  08/04/21   12:37 EDT    I have personally performed the services described in " this document as transcribed by the above individual, and it is both accurate and complete.  Joni Pritchett MD  8/4/2021  12:48 EDT

## 2021-08-18 ENCOUNTER — TELEPHONE (OUTPATIENT)
Dept: INTERNAL MEDICINE | Facility: CLINIC | Age: 26
End: 2021-08-18

## 2021-08-18 ENCOUNTER — LAB (OUTPATIENT)
Dept: LAB | Facility: HOSPITAL | Age: 26
End: 2021-08-18

## 2021-08-18 ENCOUNTER — OFFICE VISIT (OUTPATIENT)
Dept: INTERNAL MEDICINE | Facility: CLINIC | Age: 26
End: 2021-08-18

## 2021-08-18 VITALS
HEIGHT: 70 IN | BODY MASS INDEX: 26.05 KG/M2 | DIASTOLIC BLOOD PRESSURE: 70 MMHG | SYSTOLIC BLOOD PRESSURE: 122 MMHG | OXYGEN SATURATION: 98 % | WEIGHT: 182 LBS | HEART RATE: 77 BPM

## 2021-08-18 DIAGNOSIS — F41.1 GENERALIZED ANXIETY DISORDER: ICD-10-CM

## 2021-08-18 DIAGNOSIS — F43.0 REACTION, SITUATIONAL, ACUTE, TO STRESS: ICD-10-CM

## 2021-08-18 DIAGNOSIS — E23.0 PITUITARY HYPOGONADISM (HCC): ICD-10-CM

## 2021-08-18 DIAGNOSIS — Z00.00 ROUTINE GENERAL MEDICAL EXAMINATION AT A HEALTH CARE FACILITY: ICD-10-CM

## 2021-08-18 DIAGNOSIS — R00.2 HEART PALPITATIONS: ICD-10-CM

## 2021-08-18 DIAGNOSIS — Z00.00 ROUTINE GENERAL MEDICAL EXAMINATION AT A HEALTH CARE FACILITY: Primary | ICD-10-CM

## 2021-08-18 LAB
25(OH)D3 SERPL-MCNC: 30.5 NG/ML (ref 30–100)
BILIRUB BLD-MCNC: NEGATIVE MG/DL
CLARITY, POC: CLEAR
COLOR UR: YELLOW
GLUCOSE UR STRIP-MCNC: NEGATIVE MG/DL
HCV AB SER DONR QL: NORMAL
KETONES UR QL: NEGATIVE
LEUKOCYTE EST, POC: NEGATIVE
NITRITE UR-MCNC: NEGATIVE MG/ML
PH UR: 7 [PH] (ref 5–8)
PROT UR STRIP-MCNC: NEGATIVE MG/DL
RBC # UR STRIP: NEGATIVE /UL
SP GR UR: 1.02 (ref 1–1.03)
UROBILINOGEN UR QL: NORMAL
VIT B12 BLD-MCNC: 965 PG/ML (ref 211–946)

## 2021-08-18 PROCEDURE — 82306 VITAMIN D 25 HYDROXY: CPT

## 2021-08-18 PROCEDURE — 99395 PREV VISIT EST AGE 18-39: CPT | Performed by: INTERNAL MEDICINE

## 2021-08-18 PROCEDURE — 80053 COMPREHEN METABOLIC PANEL: CPT

## 2021-08-18 PROCEDURE — 82607 VITAMIN B-12: CPT

## 2021-08-18 PROCEDURE — 81003 URINALYSIS AUTO W/O SCOPE: CPT | Performed by: INTERNAL MEDICINE

## 2021-08-18 PROCEDURE — 86803 HEPATITIS C AB TEST: CPT

## 2021-08-18 PROCEDURE — 36415 COLL VENOUS BLD VENIPUNCTURE: CPT

## 2021-08-18 PROCEDURE — 80061 LIPID PANEL: CPT

## 2021-08-18 PROCEDURE — 84443 ASSAY THYROID STIM HORMONE: CPT

## 2021-08-18 RX ORDER — FLUOXETINE HYDROCHLORIDE 20 MG/1
20 CAPSULE ORAL DAILY
Qty: 90 CAPSULE | Refills: 1 | Status: SHIPPED | OUTPATIENT
Start: 2021-08-18 | End: 2021-08-19

## 2021-08-18 NOTE — PROGRESS NOTES
Chief Complaint   Patient presents with   • Annual Exam       History of Present Illness  HM, Adult Male:  The patient is being seen for a health maintenance evaluation. The last health maintenance visit was 1 year(s) ago.   Social History: Household members include spouse.they are expecting, due in November. He is  . Work status: working full time and going to school. The patient has never smoked cigarettes. He reports never drinking alcohol. He has never used illicit drugs.   General Health: The patient's health is described as good. He has regular dental visits. He denies vision problems. He denies hearing loss. Immunizations status: up to date.   Lifestyle:. He consumes a diverse and healthy diet. He does not have any weight concerns. He exercises regularly. He does not use tobacco. He denies alcohol use. He denies drug use.   Screening: Cancer screening reviewed and current.   Metabolic screening reviewed and current.   Risk screening reviewed and current.     Getting MRI of left knee on 8/31 for his previous lat meniscus tear.  Pregnant , due in December.  Had the covid in January, has not received the vaccine yet.    Was off testosterone  Shots, and now back on them.getting syringes is a hassle  Not taking atenolol.  Review of Systems   Constitutional: Negative.  Negative for chills, fatigue and fever.   HENT: Negative for congestion, ear discharge, ear pain, sinus pressure and sore throat.    Eyes: Negative for pain, redness and visual disturbance.   Respiratory: Negative for cough, chest tightness and shortness of breath.    Cardiovascular: Negative for chest pain, palpitations and leg swelling.   Gastrointestinal: Negative for abdominal pain, diarrhea, nausea and vomiting.   Endocrine: Negative for cold intolerance and heat intolerance.   Genitourinary: Negative for flank pain and urgency.   Musculoskeletal: Positive for arthralgias and joint swelling. Negative for back pain, gait problem and  "myalgias.   Skin: Negative for pallor and rash.   Neurological: Negative for dizziness, syncope, weakness and headaches.   Psychiatric/Behavioral: Negative for confusion, dysphoric mood and sleep disturbance. The patient is not nervous/anxious.        Patient Active Problem List   Diagnosis   • Asthma   • GERD (gastroesophageal reflux disease)   • Panic attack as reaction to stress   • Reaction, situational, acute, to stress   • Eustachian tube dysfunction   • Pituitary hypogonadism (CMS/HCC)   • Chronic pain of left knee   • Palpitations       Social History     Socioeconomic History   • Marital status:      Spouse name: Not on file   • Number of children: Not on file   • Years of education: Not on file   • Highest education level: Not on file   Tobacco Use   • Smoking status: Former Smoker     Packs/day: 0.00     Years: 0.00     Pack years: 0.00     Start date: 2015     Quit date:      Years since quittin.5   • Smokeless tobacco: Never Used   • Tobacco comment: Budget   Vaping Use   • Vaping Use: Never used   Substance and Sexual Activity   • Alcohol use: No   • Drug use: No   • Sexual activity: Never       Current Outpatient Medications on File Prior to Visit   Medication Sig Dispense Refill   • atenolol (TENORMIN) 25 MG tablet Take 1 tablet by mouth 2 (two) times a day. 180 tablet 1   • cyclobenzaprine (FLEXERIL) 10 MG tablet Take 1 tablet by mouth At Night As Needed for Muscle Spasms. 30 tablet 2   • omeprazole (priLOSEC) 20 MG capsule Take 1 capsule by mouth Daily. 90 capsule 1   • Syringe/Needle, Disp, (B-D 3CC LUER-RENETTA SYR 37VY6-2/2) 21G X 1-1/2\" 3 ML misc USE ONE SYRINGE EVERY 2 WEEKS TO ADMINISTER TESTOSTERONE INJECTION 2 each 5   • Testosterone Enanthate 200 MG/ML solution Inject 200 mg into the appropriate muscle as directed by prescriber Every 14 (Fourteen) Days. 10 mL 1     No current facility-administered medications on file prior to visit.       No Known Allergies    /70   " "Pulse 77   Ht 177.8 cm (70\")   Wt 82.6 kg (182 lb)   SpO2 98% Comment: ra  BMI 26.11 kg/m²          The following portions of the patient's history were reviewed and updated as appropriate: allergies, current medications, past family history, past medical history, past social history, past surgical history and problem list.    Physical Exam  Vitals and nursing note reviewed.   Constitutional:       General: He is not in acute distress.     Appearance: Normal appearance. He is well-developed.   HENT:      Head: Normocephalic and atraumatic.      Right Ear: Tympanic membrane and external ear normal.      Left Ear: Tympanic membrane and external ear normal.      Nose: Nose normal.      Mouth/Throat:      Mouth: Mucous membranes are moist.      Pharynx: No oropharyngeal exudate.   Eyes:      General: No scleral icterus.     Conjunctiva/sclera: Conjunctivae normal.      Pupils: Pupils are equal, round, and reactive to light.   Neck:      Thyroid: No thyromegaly.      Vascular: No carotid bruit.   Cardiovascular:      Rate and Rhythm: Normal rate and regular rhythm.      Pulses: Normal pulses.      Heart sounds: Normal heart sounds. No murmur heard.   No friction rub. No gallop.    Pulmonary:      Effort: Pulmonary effort is normal.      Breath sounds: Normal breath sounds. No rhonchi or rales.   Abdominal:      General: Bowel sounds are normal. There is no distension.      Palpations: Abdomen is soft.      Tenderness: There is no abdominal tenderness. There is no right CVA tenderness, left CVA tenderness, guarding or rebound.      Hernia: No hernia is present.   Musculoskeletal:         General: Tenderness present. Normal range of motion.      Cervical back: Normal range of motion and neck supple.      Right lower leg: No edema.      Left lower leg: No edema.   Lymphadenopathy:      Cervical: No cervical adenopathy.   Skin:     General: Skin is warm and dry.      Findings: No rash.   Neurological:      General: No " focal deficit present.      Mental Status: He is alert and oriented to person, place, and time. Mental status is at baseline.      Cranial Nerves: No cranial nerve deficit.      Sensory: No sensory deficit.      Coordination: Coordination normal.      Gait: Gait normal.      Deep Tendon Reflexes: Reflexes normal.   Psychiatric:         Mood and Affect: Mood normal.         Behavior: Behavior normal.         Judgment: Judgment normal.         Results for orders placed or performed in visit on 08/18/21   POCT urinalysis dipstick, automated    Specimen: Urine   Result Value Ref Range    Color Yellow Yellow, Straw, Dark Yellow, Princess    Clarity, UA Clear Clear    Specific Gravity  1.020 1.005 - 1.030    pH, Urine 7.0 5.0 - 8.0    Leukocytes Negative Negative    Nitrite, UA Negative Negative    Protein, POC Negative Negative mg/dL    Glucose, UA Negative Negative, 1000 mg/dL (3+) mg/dL    Ketones, UA Negative Negative    Urobilinogen, UA Normal Normal    Bilirubin Negative Negative    Blood, UA Negative Negative       Diagnoses and all orders for this visit:    1. Routine general medical examination at a health care facility (Primary)  -     POCT urinalysis dipstick, automated  -     Comprehensive Metabolic Panel; Future  -     Lipid Panel; Future  -     TSH; Future  -     Vitamin D 25 Hydroxy; Future  -     Vitamin B12; Future  -     Hepatitis C Antibody; Future    2. Pituitary hypogonadism (CMS/HCC)    3. Reaction, situational, acute, to stress    4. Generalized anxiety disorder  Comments:  worse due to loss of his pet, short term increase.  Orders:  -     Discontinue: FLUoxetine (PROzac) 20 MG capsule; Take 1 capsule by mouth Daily. Take 2 daily x 30 days then 1 daily therafter  Dispense: 90 capsule; Refill: 1  -     FLUoxetine (PROzac) 20 MG capsule; Take 1 capsule by mouth Daily.  Dispense: 90 capsule; Refill: 1    5. Heart palpitations    better, not taking the betablocker unless needed.    Health Maintenance    Topic Date Due   • Pneumococcal Vaccine 0-64 (1 of 2 - PPSV23) Never done   • COVID-19 Vaccine (1) Never done   • TDAP/TD VACCINES (1 - Tdap) 01/13/2023 (Originally 6/29/2012)   • INFLUENZA VACCINE  10/01/2021   • ANNUAL PHYSICAL  08/19/2022   • HEPATITIS C SCREENING  Completed       Discussion/Summary  Impression: health maintenance visit, healthy adult male.   Currently, he eats a healthy diet and has an adequate exercise regimen.   Prostate cancer screening: PSA was ordered.   Testicular cancer screening: monthly self testicular exam was advised.   Colorectal cancer screening: fecal occult blood testing is needed every year and colonoscopy not indicated.   CT low dose screen- not indicated.  Screening lab work includes glucose, lipid profile and 25-hydroxyvitamin D.   The immunizations are up to date.   Advice and education were given regarding cardiovascular risk reduction, healthy dietary habits, Seatbelt and helmet use and self skin examination.        Return in about 6 months (around 2/18/2022).    Electronically signed by:    Bre Bull MD

## 2021-08-18 NOTE — TELEPHONE ENCOUNTER
[facility] [address] [place] [id number] [facility] [phone] [facility] [phone] [facility] (Pharmacy)  FLUoxetine (PROzac) 20 MG capsule NEEDS TO KNOW WHICH SET OF INSTRUCTIONS PCP WANTS ON THE PRESCRIPTION. PLEASE RETURN CALL

## 2021-08-19 ENCOUNTER — TELEPHONE (OUTPATIENT)
Dept: INTERNAL MEDICINE | Facility: CLINIC | Age: 26
End: 2021-08-19

## 2021-08-19 LAB
ALBUMIN SERPL-MCNC: 4.5 G/DL (ref 3.5–5.2)
ALBUMIN/GLOB SERPL: 1.4 G/DL
ALP SERPL-CCNC: 63 U/L (ref 39–117)
ALT SERPL W P-5'-P-CCNC: 58 U/L (ref 1–41)
ANION GAP SERPL CALCULATED.3IONS-SCNC: 7 MMOL/L (ref 5–15)
AST SERPL-CCNC: 36 U/L (ref 1–40)
BILIRUB SERPL-MCNC: 0.9 MG/DL (ref 0–1.2)
BUN SERPL-MCNC: 19 MG/DL (ref 6–20)
BUN/CREAT SERPL: 26.8 (ref 7–25)
CALCIUM SPEC-SCNC: 9.3 MG/DL (ref 8.6–10.5)
CHLORIDE SERPL-SCNC: 102 MMOL/L (ref 98–107)
CHOLEST SERPL-MCNC: 166 MG/DL (ref 0–200)
CO2 SERPL-SCNC: 28 MMOL/L (ref 22–29)
CREAT SERPL-MCNC: 0.71 MG/DL (ref 0.76–1.27)
GFR SERPL CREATININE-BSD FRML MDRD: 132 ML/MIN/1.73
GLOBULIN UR ELPH-MCNC: 3.3 GM/DL
GLUCOSE SERPL-MCNC: 78 MG/DL (ref 65–99)
HDLC SERPL-MCNC: 31 MG/DL (ref 40–60)
LDLC SERPL CALC-MCNC: 102 MG/DL (ref 0–100)
LDLC/HDLC SERPL: 3.12 {RATIO}
POTASSIUM SERPL-SCNC: 4 MMOL/L (ref 3.5–5.2)
PROT SERPL-MCNC: 7.8 G/DL (ref 6–8.5)
SODIUM SERPL-SCNC: 137 MMOL/L (ref 136–145)
TRIGL SERPL-MCNC: 191 MG/DL (ref 0–150)
TSH SERPL DL<=0.05 MIU/L-ACNC: 0.97 UIU/ML (ref 0.27–4.2)
VLDLC SERPL-MCNC: 33 MG/DL (ref 5–40)

## 2021-08-19 RX ORDER — FLUOXETINE HYDROCHLORIDE 20 MG/1
20 CAPSULE ORAL DAILY
Qty: 90 CAPSULE | Refills: 1 | Status: SHIPPED | OUTPATIENT
Start: 2021-08-19 | End: 2022-02-11 | Stop reason: SDUPTHER

## 2021-08-19 NOTE — TELEPHONE ENCOUNTER
[facility] NEEDS CLARIFICATION ON MEDICATION DOSING [facility]    [phone]

## 2021-08-31 ENCOUNTER — HOSPITAL ENCOUNTER (OUTPATIENT)
Dept: MRI IMAGING | Facility: HOSPITAL | Age: 26
Discharge: HOME | End: 2021-08-31
Admitting: ORTHOPAEDIC SURGERY

## 2021-08-31 DIAGNOSIS — G89.29 CHRONIC PAIN OF LEFT KNEE: ICD-10-CM

## 2021-08-31 DIAGNOSIS — M25.562 CHRONIC PAIN OF LEFT KNEE: ICD-10-CM

## 2021-08-31 PROCEDURE — 73721 MRI JNT OF LWR EXTRE W/O DYE: CPT

## 2021-09-15 ENCOUNTER — OFFICE VISIT (OUTPATIENT)
Dept: ORTHOPEDIC SURGERY | Facility: CLINIC | Age: 26
End: 2021-09-15

## 2021-09-15 VITALS
BODY MASS INDEX: 26.07 KG/M2 | HEART RATE: 68 BPM | WEIGHT: 182.1 LBS | SYSTOLIC BLOOD PRESSURE: 146 MMHG | DIASTOLIC BLOOD PRESSURE: 75 MMHG | HEIGHT: 70 IN

## 2021-09-15 DIAGNOSIS — S83.242A TEAR OF MEDIAL MENISCUS OF LEFT KNEE, UNSPECIFIED TEAR TYPE, UNSPECIFIED WHETHER OLD OR CURRENT TEAR, INITIAL ENCOUNTER: Primary | ICD-10-CM

## 2021-09-15 PROCEDURE — 99214 OFFICE O/P EST MOD 30 MIN: CPT | Performed by: ORTHOPAEDIC SURGERY

## 2021-09-15 NOTE — PROGRESS NOTES
[facility]    Subjective     Chief Complaint   Patient presents with   • Follow-up     Post MRI 08/31/21- Chronic pain of left knee         HPI    Blade Hall is a 26 y.o. male who follows up for left knee pain.    The patient states that his right knee pain is about the same as it was at his last visit. The pain has been ongoing for approximately 4 years. The pain is 5 to 6 out of 10 and is intermittent. The pain is exacerbated by standing for a prolonged amount of time and it causes him to stand on his right lower extremity. He denies playing soccer for some time. He has had physical therapy in the past.    His wife works and is having a baby in 12/2021 and will not have assistance to recover from a knee arthroscopy until his [place]. He works as a  at [employer] and is on his feet all day.    I have reviewed the following portions of the patient's history and agree with: History of Present Illness and Review of Systems    Patient Active Problem List   Diagnosis   • Asthma   • GERD (gastroesophageal reflux disease)   • Panic attack as reaction to stress   • Reaction, situational, acute, to stress   • Eustachian tube dysfunction   • Pituitary hypogonadism (CMS/HCC)   • Chronic pain of left knee   • Palpitations     Past Medical History:   Diagnosis Date   • Asthma    • GERD (gastroesophageal reflux disease)    • Low testosterone in male    • Palpitations 7/13/2020   • Panic attack as reaction to stress 9/9/2016    Last Impression: 12 May 2015  Adv. to start atenolol tonight, and then after 2 days use     prn.Take vistaril to sleep, so that he gets 8-9 hrs rest.cont prozac at current dose, as he     has dizziness with med change.  Bre Bull (Internal Medicine)      Past Surgical History:   Procedure Laterality Date   • APPENDECTOMY        History reviewed. No pertinent family history.  Social History     Socioeconomic History  "  • Marital status:      Spouse name: Not on file   • Number of children: Not on file   • Years of education: Not on file   • Highest education level: Not on file   Tobacco Use   • Smoking status: Former Smoker     Packs/day: 0.00     Years: 0.00     Pack years: 0.00     Start date: 2015     Quit date:      Years since quittin.6   • Smokeless tobacco: Never Used   • Tobacco comment: Budget   Vaping Use   • Vaping Use: Never used   Substance and Sexual Activity   • Alcohol use: No   • Drug use: No   • Sexual activity: Never      Current Outpatient Medications on File Prior to Visit   Medication Sig Dispense Refill   • atenolol (TENORMIN) 25 MG tablet Take 1 tablet by mouth 2 (two) times a day. 180 tablet 1   • cyclobenzaprine (FLEXERIL) 10 MG tablet Take 1 tablet by mouth At Night As Needed for Muscle Spasms. 30 tablet 2   • FLUoxetine (PROzac) 20 MG capsule Take 1 capsule by mouth Daily. 90 capsule 1   • omeprazole (priLOSEC) 20 MG capsule Take 1 capsule by mouth Daily. 90 capsule 1   • Syringe/Needle, Disp, (B-D 3CC LUER-RENETTA SYR 98YT1-3/2) 21G X 1-1/2\" 3 ML misc USE ONE SYRINGE EVERY 2 WEEKS TO ADMINISTER TESTOSTERONE INJECTION 2 each 5   • Testosterone Enanthate 200 MG/ML solution Inject 200 mg into the appropriate muscle as directed by prescriber Every 14 (Fourteen) Days. 10 mL 1     No current facility-administered medications on file prior to visit.      No Known Allergies     Review of Systems   Constitutional: Negative for activity change, appetite change, chills, diaphoresis, fatigue, fever and unexpected weight change.   HENT: Negative for congestion, dental problem, drooling, ear discharge, ear pain, facial swelling, hearing loss, mouth sores, nosebleeds, postnasal drip, rhinorrhea, sinus pressure, sneezing, sore throat, tinnitus, trouble swallowing and voice change.    Eyes: Negative for photophobia, pain, discharge, redness, itching and visual disturbance.   Respiratory: Negative for " "apnea, cough, choking, chest tightness, shortness of breath, wheezing and stridor.    Cardiovascular: Negative for chest pain, palpitations and leg swelling.   Gastrointestinal: Negative for abdominal distention, abdominal pain, anal bleeding, blood in stool, constipation, diarrhea, nausea, rectal pain and vomiting.   Endocrine: Negative for cold intolerance, heat intolerance, polydipsia, polyphagia and polyuria.   Genitourinary: Negative for decreased urine volume, difficulty urinating, dysuria, enuresis, flank pain, frequency, genital sores, hematuria and urgency.   Musculoskeletal: Positive for arthralgias. Negative for back pain, gait problem, joint swelling, myalgias, neck pain and neck stiffness.   Skin: Negative for color change, pallor, rash and wound.   Allergic/Immunologic: Negative for environmental allergies, food allergies and immunocompromised state.   Neurological: Negative for dizziness, tremors, seizures, syncope, facial asymmetry, speech difficulty, weakness, light-headedness, numbness and headaches.   Hematological: Negative for adenopathy. Does not bruise/bleed easily.   Psychiatric/Behavioral: Negative for agitation, behavioral problems, confusion, decreased concentration, dysphoric mood, hallucinations, self-injury, sleep disturbance and suicidal ideas. The patient is not nervous/anxious and is not hyperactive.         Objective      Physical Exam  /75   Pulse 68   Ht 177.8 cm (70\")   Wt 82.6 kg (182 lb 1.6 oz)   BMI 26.13 kg/m²     Body mass index is 26.13 kg/m².    General:   Mental Status:  Alert   Appearance: Cooperative, in no acute distress   Build and Nutrition: Well-nourished well-developed male   Orientation: Alert and oriented to person, place and time   Posture: Normal   Gait: Nonantalgic    Integument  • Right knee: No skin lesions, rash, or ecchymosis.    Lower Extremities  • Right Knee:  • Tenderness: No medial or lateral joint line tenderness.  • Swelling: None  • " Effusion: Negative  • Crepitus: None  • Atrophy: None  • Range of motion:      • Extension: 0°      • Flexion: 130°  • Instability: No varus or valgus laxity. Negative anterior drawer.  • Deformities: None        Imaging/Studies    MRI Knee Left Without Contrast (08/31/2021 08:19)    I did review the imaging and report from 08/31/2021 and agree with the findings. After reviewing the MRI from 08/31/2021 and the MRI from 09/11/2019, the signal changes in the posterior horn of the medial meniscus appeared to be unchanged and the lateral meniscus appears to be the same also.    Assessment and Plan     Diagnoses and all orders for this visit:    1. Tear of medial meniscus of left knee, unspecified tear type, unspecified whether old or current tear, initial encounter (Primary)        1. Tear of medial meniscus of left knee, unspecified tear type, unspecified whether old or current tear, initial encounter        We discussed the results of the patient's past and current MRIs. I informed him that there is a possibility of his left knee having a tear, but the only way to definitively know is via left knee arthroscopy. We discussed the risks, benefits, and healing process of a knee arthroscopy. He would like to consider his options and will contact us if he would like to proceed. He has previously taken anti-inflammatories, physical therapy, and has rested his knee and it continues to bother him.    Surgical Counseling     After examining the patient and reviewing the diagnostic studies, I discussed the non-operative and surgical options for their knee problem. The patient wishes to proceed with operative intervention, knowing the risks, benefits and alternatives to the surgical procedure.  Risks were discussed, which included, but are not limited to: bleeding, infection, damage to blood vessels and nerves, no improvement in symptoms, worsening symptoms, incomplete pain relief, need for further surgery, deep venous  thrombosis, pulmonary embolus, death and anesthetic complications.  The patient understands, consents, and their questions were answered.  The typical rehabilitative course was also discussed.  We will schedule surgery at a mutually convenient time, if the patient elects.    Return for surgery, he is considering and will call back.      Transcribed from ambient dictation for Joni Pritchett MD by Michael Farr.  09/15/21   16:40 EDT    I have personally performed the services described in this document as transcribed by the above individual, and it is both accurate and complete.  Joni Pritchett MD  9/15/2021  17:30 EDT

## 2021-11-02 ENCOUNTER — OFFICE VISIT (OUTPATIENT)
Dept: ENDOCRINOLOGY | Facility: CLINIC | Age: 26
End: 2021-11-02

## 2021-11-02 VITALS
BODY MASS INDEX: 26.26 KG/M2 | HEIGHT: 70 IN | WEIGHT: 183.4 LBS | HEART RATE: 58 BPM | DIASTOLIC BLOOD PRESSURE: 64 MMHG | SYSTOLIC BLOOD PRESSURE: 116 MMHG | OXYGEN SATURATION: 98 %

## 2021-11-02 DIAGNOSIS — G89.29 CHRONIC PAIN OF LEFT KNEE: ICD-10-CM

## 2021-11-02 DIAGNOSIS — M25.562 CHRONIC PAIN OF LEFT KNEE: ICD-10-CM

## 2021-11-02 DIAGNOSIS — E23.0 PITUITARY HYPOGONADISM (HCC): Primary | ICD-10-CM

## 2021-11-02 DIAGNOSIS — E23.0 HYPOGONADOTROPIC HYPOGONADISM (HCC): ICD-10-CM

## 2021-11-02 PROCEDURE — 99214 OFFICE O/P EST MOD 30 MIN: CPT | Performed by: INTERNAL MEDICINE

## 2021-11-02 RX ORDER — TESTOSTERONE ENANTHATE 200 MG/ML
200 INJECTION, SOLUTION INTRAMUSCULAR
Qty: 10 ML | Refills: 1 | Status: CANCELLED | OUTPATIENT
Start: 2021-11-02

## 2021-11-02 NOTE — ASSESSMENT & PLAN NOTE
Forgot shot yesterday  Will take tomorrow   Recommended take shot and get lab work 1 week from injection   Option to oral supplementation discussed and he is interested   Will look at pricing and availability   He does have significant peak and daryn with injections

## 2021-11-02 NOTE — ASSESSMENT & PLAN NOTE
Has now seen Dr Pritchett and needs surgery   Also expecting new baby   Requests letter of necessity for family to visit in order to help with his care, care for new baby

## 2021-11-02 NOTE — PROGRESS NOTES
"Blade CASSIDY [facility] 26 y.o.  CC:Follow-up and Hypogonadism      [place]: Follow-up and Hypogonadism    has not had shot yet for this 2 week cycle   Having some symptoms of fatigue prior to next shot   Also left knee pain - saw Dr Pritchett and surgery is needed  Expecting baby     No Known Allergies    Current Outpatient Medications:   •  atenolol (TENORMIN) 25 MG tablet, Take 1 tablet by mouth 2 (two) times a day., Disp: 180 tablet, Rfl: 1  •  cyclobenzaprine (FLEXERIL) 10 MG tablet, Take 1 tablet by mouth At Night As Needed for Muscle Spasms., Disp: 30 tablet, Rfl: 2  •  FLUoxetine (PROzac) 20 MG capsule, Take 1 capsule by mouth Daily., Disp: 90 capsule, Rfl: 1  •  omeprazole (priLOSEC) 20 MG capsule, Take 1 capsule by mouth Daily., Disp: 90 capsule, Rfl: 1  •  Syringe/Needle, Disp, (B-D 3CC LUER-RENETTA SYR 88FU1-3/2) 21G X 1-1/2\" 3 ML misc, USE ONE SYRINGE EVERY 2 WEEKS TO ADMINISTER TESTOSTERONE INJECTION, Disp: 2 each, Rfl: 5  •  Testosterone Enanthate 200 MG/ML solution, Inject 200 mg into the appropriate muscle as directed by prescriber Every 14 (Fourteen) Days., Disp: 10 mL, Rfl: 1  Patient Active Problem List    Diagnosis    • Palpitations [R00.2]    • Chronic pain of left knee [M25.562, G89.29]    • Hypogonadotropic hypogonadism (HCC) [E23.0]    • Asthma [J45.909]    • GERD (gastroesophageal reflux disease) [K21.9]    • Panic attack as reaction to stress [F41.0, F43.0]    • Reaction, situational, acute, to stress [F43.0]    • Eustachian tube dysfunction [H69.80]      Review of Systems   Constitutional: Negative for activity change, appetite change and unexpected weight change.   HENT: Negative for congestion and rhinorrhea.    Eyes: Negative for visual disturbance.   Respiratory: Negative for cough and shortness of breath.    Cardiovascular: Negative for palpitations and leg swelling.   Gastrointestinal: Negative for constipation, diarrhea and nausea.   Genitourinary: Negative for hematuria.   Musculoskeletal: Positive for " "arthralgias (left knee ). Negative for back pain, gait problem, joint swelling and myalgias.   Skin: Negative for color change, rash and wound.   Allergic/Immunologic: Negative for environmental allergies, food allergies and immunocompromised state.   Neurological: Negative for dizziness, weakness and light-headedness.   Psychiatric/Behavioral: Negative for confusion, decreased concentration, dysphoric mood and sleep disturbance. The patient is not nervous/anxious.      Social History     Socioeconomic History   • Marital status:    Tobacco Use   • Smoking status: Former Smoker     Packs/day: 0.00     Years: 0.00     Pack years: 0.00     Start date: 2015     Quit date: 2016     Years since quittin.7   • Smokeless tobacco: Never Used   • Tobacco comment: Budget   Vaping Use   • Vaping Use: Never used   Substance and Sexual Activity   • Alcohol use: No   • Drug use: No   • Sexual activity: Never     History reviewed. No pertinent family history.  /64   Pulse 58   Ht 177.8 cm (70\")   Wt 83.2 kg (183 lb 6.4 oz)   SpO2 98%   BMI 26.32 kg/m²   Physical Exam  Vitals and nursing note reviewed.   Constitutional:       Appearance: Normal appearance. He is well-developed.   HENT:      Head: Normocephalic and atraumatic.   Eyes:      General: Lids are normal.      Extraocular Movements: Extraocular movements intact.      Conjunctiva/sclera: Conjunctivae normal.      Pupils: Pupils are equal, round, and reactive to light.   Neck:      Thyroid: No thyroid mass or thyromegaly.      Vascular: No carotid bruit.      Trachea: Trachea normal. No tracheal deviation.   Cardiovascular:      Rate and Rhythm: Normal rate and regular rhythm.      Pulses: Normal pulses.      Heart sounds: Normal heart sounds. No murmur heard.  No friction rub. No gallop.    Pulmonary:      Effort: Pulmonary effort is normal. No respiratory distress.      Breath sounds: Normal breath sounds. No wheezing.   Musculoskeletal:         " General: No deformity. Normal range of motion.      Cervical back: Normal range of motion and neck supple.   Lymphadenopathy:      Cervical: No cervical adenopathy.   Skin:     General: Skin is warm and dry.      Findings: No erythema or rash.      Nails: There is no clubbing.   Neurological:      General: No focal deficit present.      Mental Status: He is alert and oriented to person, place, and time.      Cranial Nerves: No cranial nerve deficit.      Deep Tendon Reflexes: Reflexes are normal and symmetric. Reflexes normal.   Psychiatric:         Mood and Affect: Mood normal.         Speech: Speech normal.         Behavior: Behavior normal.         Thought Content: Thought content normal.         Judgment: Judgment normal.       Results for orders placed or performed in visit on 08/18/21   Comprehensive Metabolic Panel    Specimen: Blood   Result Value Ref Range    Glucose 78 65 - 99 mg/dL    BUN 19 6 - 20 mg/dL    Creatinine 0.71 (L) 0.76 - 1.27 mg/dL    Sodium 137 136 - 145 mmol/L    Potassium 4.0 3.5 - 5.2 mmol/L    Chloride 102 98 - 107 mmol/L    CO2 28.0 22.0 - 29.0 mmol/L    Calcium 9.3 8.6 - 10.5 mg/dL    Total Protein 7.8 6.0 - 8.5 g/dL    Albumin 4.50 3.50 - 5.20 g/dL    ALT (SGPT) 58 (H) 1 - 41 U/L    AST (SGOT) 36 1 - 40 U/L    Alkaline Phosphatase 63 39 - 117 U/L    Total Bilirubin 0.9 0.0 - 1.2 mg/dL    eGFR Non African Amer 132 >60 mL/min/1.73    Globulin 3.3 gm/dL    A/G Ratio 1.4 g/dL    BUN/Creatinine Ratio 26.8 (H) 7.0 - 25.0    Anion Gap 7.0 5.0 - 15.0 mmol/L   Lipid Panel    Specimen: Blood   Result Value Ref Range    Total Cholesterol 166 0 - 200 mg/dL    Triglycerides 191 (H) 0 - 150 mg/dL    HDL Cholesterol 31 (L) 40 - 60 mg/dL    LDL Cholesterol  102 (H) 0 - 100 mg/dL    VLDL Cholesterol 33 5 - 40 mg/dL    LDL/HDL Ratio 3.12    TSH    Specimen: Blood   Result Value Ref Range    TSH 0.973 0.270 - 4.200 uIU/mL   Vitamin D 25 Hydroxy    Specimen: Blood   Result Value Ref Range    25 Hydroxy,  Vitamin D 30.5 30.0 - 100.0 ng/ml   Vitamin B12    Specimen: Blood   Result Value Ref Range    Vitamin B-12 965 (H) 211 - 946 pg/mL   Hepatitis C Antibody    Specimen: Blood   Result Value Ref Range    Hepatitis C Ab Non-Reactive Non-Reactive     Diagnoses and all orders for this visit:    1. Pituitary hypogonadism (HCC) (Primary)  Assessment & Plan:  Forgot shot yesterday  Will take tomorrow   Recommended take shot and get lab work 1 week from injection   Option to oral supplementation discussed and he is interested   Will look at pricing and availability   He does have significant peak and daryn with injections       2. Hypogonadotropic hypogonadism (HCC)  Assessment & Plan:  Forgot shot yesterday  Will take tomorrow   Recommended take shot and get lab work 1 week from injection   Option to oral supplementation discussed and he is interested   Will look at pricing and availability   He does have significant peak and daryn with injections     Orders:  -     CBC & Differential  -     Comprehensive Metabolic Panel  -     Testosterone  -     T4, Free  -     TSH  -     Testosterone Free Direct    3. Chronic pain of left knee  Assessment & Plan:  Has now seen Dr Pritchett and needs surgery   Also expecting new baby   Requests letter of necessity for family to visit in order to help with his care, care for new baby    Return in about 6 months (around 5/2/2022).    Roxanna Diaz MD  Signed Roxanna Diaz MD

## 2021-11-09 ENCOUNTER — LAB (OUTPATIENT)
Dept: LAB | Facility: HOSPITAL | Age: 26
End: 2021-11-09

## 2021-11-09 ENCOUNTER — LAB (OUTPATIENT)
Dept: ENDOCRINOLOGY | Facility: CLINIC | Age: 26
End: 2021-11-09

## 2021-11-09 LAB
BASOPHILS # BLD AUTO: 0.06 10*3/MM3 (ref 0–0.2)
BASOPHILS NFR BLD AUTO: 0.7 % (ref 0–1.5)
DEPRECATED RDW RBC AUTO: 39.1 FL (ref 37–54)
EOSINOPHIL # BLD AUTO: 0.24 10*3/MM3 (ref 0–0.4)
EOSINOPHIL NFR BLD AUTO: 2.9 % (ref 0.3–6.2)
ERYTHROCYTE [DISTWIDTH] IN BLOOD BY AUTOMATED COUNT: 12.5 % (ref 12.3–15.4)
HCT VFR BLD AUTO: 49.9 % (ref 37.5–51)
HGB BLD-MCNC: 17.1 G/DL (ref 13–17.7)
IMM GRANULOCYTES # BLD AUTO: 0.03 10*3/MM3 (ref 0–0.05)
IMM GRANULOCYTES NFR BLD AUTO: 0.4 % (ref 0–0.5)
LYMPHOCYTES # BLD AUTO: 2.91 10*3/MM3 (ref 0.7–3.1)
LYMPHOCYTES NFR BLD AUTO: 35.4 % (ref 19.6–45.3)
MCH RBC QN AUTO: 29.6 PG (ref 26.6–33)
MCHC RBC AUTO-ENTMCNC: 34.3 G/DL (ref 31.5–35.7)
MCV RBC AUTO: 86.5 FL (ref 79–97)
MONOCYTES # BLD AUTO: 0.64 10*3/MM3 (ref 0.1–0.9)
MONOCYTES NFR BLD AUTO: 7.8 % (ref 5–12)
NEUTROPHILS NFR BLD AUTO: 4.35 10*3/MM3 (ref 1.7–7)
NEUTROPHILS NFR BLD AUTO: 52.8 % (ref 42.7–76)
NRBC BLD AUTO-RTO: 0 /100 WBC (ref 0–0.2)
PLATELET # BLD AUTO: 204 10*3/MM3 (ref 140–450)
PMV BLD AUTO: 11.6 FL (ref 6–12)
RBC # BLD AUTO: 5.77 10*6/MM3 (ref 4.14–5.8)
WBC # BLD AUTO: 8.23 10*3/MM3 (ref 3.4–10.8)

## 2021-11-09 PROCEDURE — 84443 ASSAY THYROID STIM HORMONE: CPT | Performed by: INTERNAL MEDICINE

## 2021-11-09 PROCEDURE — 80053 COMPREHEN METABOLIC PANEL: CPT | Performed by: INTERNAL MEDICINE

## 2021-11-09 PROCEDURE — 84403 ASSAY OF TOTAL TESTOSTERONE: CPT | Performed by: INTERNAL MEDICINE

## 2021-11-09 PROCEDURE — 84402 ASSAY OF FREE TESTOSTERONE: CPT | Performed by: INTERNAL MEDICINE

## 2021-11-09 PROCEDURE — 85025 COMPLETE CBC W/AUTO DIFF WBC: CPT | Performed by: INTERNAL MEDICINE

## 2021-11-09 PROCEDURE — 84439 ASSAY OF FREE THYROXINE: CPT | Performed by: INTERNAL MEDICINE

## 2021-11-10 LAB
ALBUMIN SERPL-MCNC: 4.6 G/DL (ref 3.5–5.2)
ALBUMIN/GLOB SERPL: 1.4 G/DL
ALP SERPL-CCNC: 74 U/L (ref 39–117)
ALT SERPL W P-5'-P-CCNC: 43 U/L (ref 1–41)
ANION GAP SERPL CALCULATED.3IONS-SCNC: 8 MMOL/L (ref 5–15)
AST SERPL-CCNC: 51 U/L (ref 1–40)
BILIRUB SERPL-MCNC: 0.8 MG/DL (ref 0–1.2)
BUN SERPL-MCNC: 16 MG/DL (ref 6–20)
BUN/CREAT SERPL: 19.8 (ref 7–25)
CALCIUM SPEC-SCNC: 9.3 MG/DL (ref 8.6–10.5)
CHLORIDE SERPL-SCNC: 100 MMOL/L (ref 98–107)
CO2 SERPL-SCNC: 28 MMOL/L (ref 22–29)
CREAT SERPL-MCNC: 0.81 MG/DL (ref 0.76–1.27)
GFR SERPL CREATININE-BSD FRML MDRD: 113 ML/MIN/1.73
GLOBULIN UR ELPH-MCNC: 3.2 GM/DL
GLUCOSE SERPL-MCNC: 86 MG/DL (ref 65–99)
POTASSIUM SERPL-SCNC: 4.1 MMOL/L (ref 3.5–5.2)
PROT SERPL-MCNC: 7.8 G/DL (ref 6–8.5)
SODIUM SERPL-SCNC: 136 MMOL/L (ref 136–145)
T4 FREE SERPL-MCNC: 0.96 NG/DL (ref 0.93–1.7)
TESTOST SERPL-MCNC: 749 NG/DL (ref 249–836)
TSH SERPL DL<=0.05 MIU/L-ACNC: 0.88 UIU/ML (ref 0.27–4.2)

## 2021-11-12 LAB — TESTOST FREE SERPL-MCNC: 23.8 PG/ML (ref 9.3–26.5)

## 2021-11-18 ENCOUNTER — TELEPHONE (OUTPATIENT)
Dept: ENDOCRINOLOGY | Facility: CLINIC | Age: 26
End: 2021-11-18

## 2021-11-18 NOTE — TELEPHONE ENCOUNTER
Pt called to say that he would like to try the new medication for testosterone (they spoke of it at his last appt)   Not sure if she checked to see if his insurance would cover it  Pt uses live in [place]    pts number [phone]

## 2021-11-23 ENCOUNTER — TELEPHONE (OUTPATIENT)
Dept: ENDOCRINOLOGY | Facility: CLINIC | Age: 26
End: 2021-11-23

## 2021-11-23 DIAGNOSIS — E23.0 HYPOGONADOTROPIC HYPOGONADISM (HCC): Primary | ICD-10-CM

## 2021-11-23 RX ORDER — TESTOSTERONE UNDECANOATE 237 MG/1
237 CAPSULE, LIQUID FILLED ORAL 2 TIMES DAILY
Qty: 60 CAPSULE | Refills: 5 | Status: SHIPPED | OUTPATIENT
Start: 2021-11-23 | End: 2022-01-20 | Stop reason: CLARIF

## 2021-11-23 NOTE — TELEPHONE ENCOUNTER
Pt was advised with DR Diaz comments and recommendations  He states he has 2 doses of IM testosterone and will use that first before switching to oral  He voiced understanding with all instructions    Please create lab order

## 2021-11-23 NOTE — TELEPHONE ENCOUNTER
rx sent for oral testosterone replacement jatenzol 237 mg twice daily with food  He should have blood work on his current dose repeated in 1-2 weeks - to be done about 6 hours after morning dose of medication (so early afternoon)  Order for updated lab work created  He can find copay card / coupon on website for Samuel[employer]  Thanks,   Ed Diaz MD

## 2021-11-30 ENCOUNTER — OFFICE VISIT (OUTPATIENT)
Dept: INTERNAL MEDICINE | Facility: CLINIC | Age: 26
End: 2021-11-30

## 2021-11-30 VITALS
HEART RATE: 84 BPM | DIASTOLIC BLOOD PRESSURE: 90 MMHG | SYSTOLIC BLOOD PRESSURE: 140 MMHG | BODY MASS INDEX: 25.62 KG/M2 | WEIGHT: 179 LBS | HEIGHT: 70 IN | OXYGEN SATURATION: 97 %

## 2021-11-30 DIAGNOSIS — M54.50 ACUTE BILATERAL LOW BACK PAIN WITHOUT SCIATICA: Primary | ICD-10-CM

## 2021-11-30 PROCEDURE — 99213 OFFICE O/P EST LOW 20 MIN: CPT

## 2021-11-30 RX ORDER — NAPROXEN 500 MG/1
500 TABLET ORAL 2 TIMES DAILY WITH MEALS
Qty: 30 TABLET | Refills: 0 | Status: SHIPPED | OUTPATIENT
Start: 2021-11-30 | End: 2022-02-11

## 2021-11-30 RX ORDER — CYCLOBENZAPRINE HCL 10 MG
10 TABLET ORAL NIGHTLY PRN
Qty: 10 TABLET | Refills: 0 | Status: SHIPPED | OUTPATIENT
Start: 2021-11-30 | End: 2022-08-22

## 2021-12-01 ENCOUNTER — HOSPITAL ENCOUNTER (OUTPATIENT)
Dept: GENERAL RADIOLOGY | Facility: HOSPITAL | Age: 26
Discharge: HOME | End: 2021-12-01

## 2021-12-01 DIAGNOSIS — M54.50 ACUTE BILATERAL LOW BACK PAIN WITHOUT SCIATICA: ICD-10-CM

## 2021-12-01 PROCEDURE — 72100 X-RAY EXAM L-S SPINE 2/3 VWS: CPT

## 2021-12-03 ENCOUNTER — TELEPHONE (OUTPATIENT)
Dept: INTERNAL MEDICINE | Facility: CLINIC | Age: 26
End: 2021-12-03

## 2021-12-03 ENCOUNTER — PRIOR AUTHORIZATION (OUTPATIENT)
Dept: ENDOCRINOLOGY | Facility: CLINIC | Age: 26
End: 2021-12-03

## 2021-12-03 DIAGNOSIS — M54.50 ACUTE BILATERAL LOW BACK PAIN WITHOUT SCIATICA: Primary | ICD-10-CM

## 2021-12-03 NOTE — TELEPHONE ENCOUNTER
Caller: Blade Hall    Relationship: Self    Best call back number:     What is the medical concern/diagnosis: BACK PAIN     What specialty or service is being requested: PHYSICAL THERAPY     What is the provider, practice or medical service name: [facility]     What is the office phone number: [phone]    Any additional details:     AND     What test was performed: XRAY     When was the test performed: 12/1/21    Where was the test performed: [address]     Additional notes:

## 2021-12-17 NOTE — TELEPHONE ENCOUNTER
PATIENT CALLED TO CHECK STATUS OF THIS MEDICATION. HE STATES THAT HE HAS ONE MORE INJECTION LEFT BEFORE GETTING A REFILL AND WANTS TO KOW IF HE NEEDS TO STAY WITH THE INJECTABLE MEDICATION.

## 2021-12-19 DIAGNOSIS — E23.0 HYPOGONADOTROPIC HYPOGONADISM (HCC): ICD-10-CM

## 2021-12-20 RX ORDER — TESTOSTERONE ENANTHATE 200 MG/ML
INJECTION, SOLUTION INTRAMUSCULAR
Qty: 5 ML | Refills: 2 | Status: SHIPPED | OUTPATIENT
Start: 2021-12-20 | End: 2022-07-12

## 2021-12-20 NOTE — TELEPHONE ENCOUNTER
LOV 11-2-21  NOV 5-3-21  Jatenzo PA was denied so we need to refill testosterone injectable  Contract on file  mery updated today

## 2021-12-20 NOTE — TELEPHONE ENCOUNTER
Pt was advised the Jatenzo PA was denied so he will need to stay on the injectable testosterone  He had many questions about the denial and he was advised to call the insurance and also that he can appeal the decision and he voiced understanding

## 2021-12-22 RX ORDER — SYRINGE WITH NEEDLE, 1 ML 25GX5/8"
SYRINGE, EMPTY DISPOSABLE MISCELLANEOUS
Qty: 2 EACH | Refills: 5 | Status: SHIPPED | OUTPATIENT
Start: 2021-12-22 | End: 2022-01-20 | Stop reason: SDUPTHER

## 2021-12-23 RX ORDER — SYRINGE WITH NEEDLE, 1 ML 25GX5/8"
SYRINGE, EMPTY DISPOSABLE MISCELLANEOUS
Qty: 2 EACH | Refills: 5 | OUTPATIENT
Start: 2021-12-23

## 2021-12-23 NOTE — TELEPHONE ENCOUNTER
"Duplicate request RX E- scripted yesterday   B-D 3CC LUER-RENETTA SYR 80MT9-0/2 21G X 1-1/2\" 3 ML misc [[id number]]     Order Details  Dose, Route, Frequency: As Directed   Dispense Quantity: 2 each Refills: 5          Sig: USE ONE SYRINGE EVERY 2 WEEKS TO ADMINISTER TESTOSTERONE INJECTION         Start Date: 12/22/21 End Date: --   Written Date: 12/22/21 Expiration Date: 12/22/22   Original Order:  Syringe/Needle, Disp, (B-D 3CC LUER-RENETTA SYR 18UW4-8/2) 21G X 1-1/2\" 3 ML misc [[id number]]     Providers    Ordering Provider and Authorizing Provider:    Ijeoma So DO   [address] [place] [address]   Phone:  [phone]   Fax:  [phone]   NPI:  [id number]        Ordering User:  Ijeoma So DO          [address] [phone] [facility] [phone] [address] [address]   Phone:  [phone]  Fax:  [phone]       Orders with any of the following pharmaceutical classes: Medical Devices    Name Dose Frequency Start Date End Date Medication Warnings Interventions? Order Mode    Syringe/Needle, Disp, (B-D 3CC LUER-RENETTA SYR 88HC2-3/2) 21G X 1-1/2\" 3 ML misc   07/19/21 12/22/21   Outpatient      Warnings History    No Interaction Warnings Shown      Pharmacist Clinical Review History    This prescription has not been clinically reviewed.     Order Reconciliation Actions       Order Reconciliation Actions     E-Prescribing Status      Outpatient Medication Detail    B-D 3CC LUER-RENETTA SYR 91KU3-3/2 21G X 1-1/2\" 3 ML misc        Sig: USE ONE SYRINGE EVERY 2 WEEKS TO ADMINISTER TESTOSTERONE INJECTION        Sent to pharmacy as: BD Luer-Renetta Syringe 21G X 1-1/2\" 3 ML (Syringe/Needle (Disp))        Class: Normal        E-Prescribing Status: Receipt confirmed by pharmacy (12/22/2021  8:48 AM EST)              "

## 2022-01-20 ENCOUNTER — TELEPHONE (OUTPATIENT)
Dept: ENDOCRINOLOGY | Facility: CLINIC | Age: 27
End: 2022-01-20

## 2022-01-20 ENCOUNTER — CLINICAL SUPPORT (OUTPATIENT)
Dept: ENDOCRINOLOGY | Facility: CLINIC | Age: 27
End: 2022-01-20

## 2022-01-20 DIAGNOSIS — E23.0 HYPOGONADOTROPIC HYPOGONADISM: Primary | ICD-10-CM

## 2022-01-20 PROCEDURE — 96372 THER/PROPH/DIAG INJ SC/IM: CPT | Performed by: INTERNAL MEDICINE

## 2022-01-20 NOTE — TELEPHONE ENCOUNTER
Pt was given a testosterone injection here in the office and PA denial was discussed with patient.  He requested a refill on syringes to last longer than one month  rx will be sent

## 2022-01-20 NOTE — TELEPHONE ENCOUNTER
Pt is calling requesting a return call regarding his testosterone capsules, he states his insurance is no longer covering the prescription.

## 2022-01-24 RX ORDER — SYRINGE WITH NEEDLE, 1 ML 25GX5/8"
SYRINGE, EMPTY DISPOSABLE MISCELLANEOUS
Qty: 13 EACH | Refills: 3 | Status: SHIPPED | OUTPATIENT
Start: 2022-01-24 | End: 2023-03-20

## 2022-01-24 RX ORDER — TESTOSTERONE CYPIONATE 100 MG/ML
200 INJECTION, SOLUTION INTRAMUSCULAR ONCE
Status: COMPLETED | OUTPATIENT
Start: 2022-01-24 | End: 2022-10-11

## 2022-01-30 DIAGNOSIS — K21.9 GASTROESOPHAGEAL REFLUX DISEASE WITHOUT ESOPHAGITIS: ICD-10-CM

## 2022-01-31 RX ORDER — OMEPRAZOLE 20 MG/1
CAPSULE, DELAYED RELEASE ORAL
Qty: 90 CAPSULE | Refills: 1 | Status: SHIPPED | OUTPATIENT
Start: 2022-01-31 | End: 2022-08-22 | Stop reason: SDUPTHER

## 2022-02-11 ENCOUNTER — OFFICE VISIT (OUTPATIENT)
Dept: INTERNAL MEDICINE | Facility: CLINIC | Age: 27
End: 2022-02-11

## 2022-02-11 VITALS
HEIGHT: 70 IN | HEART RATE: 71 BPM | OXYGEN SATURATION: 96 % | DIASTOLIC BLOOD PRESSURE: 76 MMHG | SYSTOLIC BLOOD PRESSURE: 144 MMHG | BODY MASS INDEX: 28.06 KG/M2 | WEIGHT: 196 LBS

## 2022-02-11 DIAGNOSIS — M54.41 CHRONIC MIDLINE LOW BACK PAIN WITH BILATERAL SCIATICA: Primary | ICD-10-CM

## 2022-02-11 DIAGNOSIS — M54.42 CHRONIC MIDLINE LOW BACK PAIN WITH BILATERAL SCIATICA: Primary | ICD-10-CM

## 2022-02-11 DIAGNOSIS — G89.29 CHRONIC MIDLINE LOW BACK PAIN WITH BILATERAL SCIATICA: Primary | ICD-10-CM

## 2022-02-11 DIAGNOSIS — F41.1 GENERALIZED ANXIETY DISORDER: ICD-10-CM

## 2022-02-11 PROCEDURE — 99214 OFFICE O/P EST MOD 30 MIN: CPT | Performed by: INTERNAL MEDICINE

## 2022-02-11 RX ORDER — FLUOXETINE HYDROCHLORIDE 20 MG/1
20 CAPSULE ORAL DAILY
Qty: 90 CAPSULE | Refills: 1 | Status: SHIPPED | OUTPATIENT
Start: 2022-02-11 | End: 2022-08-22 | Stop reason: SDUPTHER

## 2022-02-11 RX ORDER — IBUPROFEN 600 MG/1
600 TABLET ORAL EVERY 8 HOURS PRN
Qty: 60 TABLET | Refills: 2 | Status: SHIPPED | OUTPATIENT
Start: 2022-02-11 | End: 2022-08-22
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
## 2022-03-07 ENCOUNTER — HOSPITAL ENCOUNTER (OUTPATIENT)
Dept: MRI IMAGING | Facility: HOSPITAL | Age: 27
Discharge: HOME | End: 2022-03-07
Admitting: INTERNAL MEDICINE

## 2022-03-07 DIAGNOSIS — M54.41 CHRONIC MIDLINE LOW BACK PAIN WITH BILATERAL SCIATICA: ICD-10-CM

## 2022-03-07 DIAGNOSIS — M54.42 CHRONIC MIDLINE LOW BACK PAIN WITH BILATERAL SCIATICA: ICD-10-CM

## 2022-03-07 DIAGNOSIS — G89.29 CHRONIC MIDLINE LOW BACK PAIN WITH BILATERAL SCIATICA: ICD-10-CM

## 2022-03-07 PROCEDURE — 72148 MRI LUMBAR SPINE W/O DYE: CPT

## 2022-03-10 ENCOUNTER — TELEPHONE (OUTPATIENT)
Dept: INTERNAL MEDICINE | Facility: CLINIC | Age: 27
End: 2022-03-10

## 2022-03-10 NOTE — TELEPHONE ENCOUNTER
Caller: Blade Hall    Relationship: Self    Best call back number: [phone]    What is the medical concern/diagnosis: NA    What specialty or service is being requested: PAIN MANAGEMENT REFERRAL    What is the provider, practice or medical service name: [facility]    What is the office location: NA    What is the office phone number: NA    Any additional details: PATIENT STATED HIS PHYSICAL THERAPIST RECOMMENDED HE SEE DOCTOR KEIKO FOR HIS BACK ISSUES

## 2022-03-16 ENCOUNTER — TELEPHONE (OUTPATIENT)
Dept: INTERNAL MEDICINE | Facility: CLINIC | Age: 27
End: 2022-03-16

## 2022-03-16 NOTE — TELEPHONE ENCOUNTER
Lynsey,    I just placed the referral last week. Can you check on the status and update patient ?    thanks

## 2022-03-16 NOTE — TELEPHONE ENCOUNTER
Caller: Blade Hall    Relationship: Self    Best call back number: [phone]    What is the medical concern/diagnosis: BACK PAIN    What specialty or service is being requested: PAIN MANAGEMENT    What is the provider, practice or medical service name: DR. KEYA ADEN    What is the office location:   [address] [place] [address] [address]  [place] [address]    What is the office phone number:   [phone]    Any additional details: PATIENT STATED HE HAD A REFERRAL PLACED FOR THIS BUT THE LOCATION WAS IN [place] AND HE WOULD LIKE THIS SENT TO THE LOCATION IN [place] INSTEAD
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
## 2022-04-12 PROBLEM — M47.816 SPONDYLOSIS OF LUMBAR REGION WITHOUT MYELOPATHY OR RADICULOPATHY: Status: ACTIVE | Noted: 2022-04-12

## 2022-04-12 PROBLEM — M51.379 DEGENERATION OF LUMBAR OR LUMBOSACRAL INTERVERTEBRAL DISC: Status: ACTIVE | Noted: 2022-04-12

## 2022-04-12 PROBLEM — M51.37 DEGENERATION OF LUMBAR OR LUMBOSACRAL INTERVERTEBRAL DISC: Status: ACTIVE | Noted: 2022-04-12

## 2022-04-12 PROBLEM — Z87.828 HISTORY OF MOTOR VEHICLE ACCIDENT: Status: ACTIVE | Noted: 2022-04-12

## 2022-04-14 ENCOUNTER — OFFICE VISIT (OUTPATIENT)
Dept: PAIN MEDICINE | Facility: CLINIC | Age: 27
End: 2022-04-14

## 2022-04-14 VITALS
DIASTOLIC BLOOD PRESSURE: 80 MMHG | WEIGHT: 191 LBS | TEMPERATURE: 98.3 F | SYSTOLIC BLOOD PRESSURE: 132 MMHG | HEIGHT: 70 IN | BODY MASS INDEX: 27.35 KG/M2 | RESPIRATION RATE: 18 BRPM | OXYGEN SATURATION: 99 % | HEART RATE: 73 BPM

## 2022-04-14 DIAGNOSIS — M47.816 SPONDYLOSIS OF LUMBAR REGION WITHOUT MYELOPATHY OR RADICULOPATHY: Primary | ICD-10-CM

## 2022-04-14 DIAGNOSIS — M25.562 CHRONIC PAIN OF LEFT KNEE: ICD-10-CM

## 2022-04-14 DIAGNOSIS — Z87.828 HISTORY OF MOTOR VEHICLE ACCIDENT: ICD-10-CM

## 2022-04-14 DIAGNOSIS — M51.37 DEGENERATION OF LUMBAR OR LUMBOSACRAL INTERVERTEBRAL DISC: ICD-10-CM

## 2022-04-14 DIAGNOSIS — M47.816 SPONDYLOSIS OF LUMBAR REGION WITHOUT MYELOPATHY OR RADICULOPATHY: ICD-10-CM

## 2022-04-14 DIAGNOSIS — M53.87 CHRONIC OR OLD STRAIN OF LUMBOSACRAL SPINE: ICD-10-CM

## 2022-04-14 DIAGNOSIS — G89.29 CHRONIC PAIN OF LEFT KNEE: ICD-10-CM

## 2022-04-14 PROCEDURE — 99204 OFFICE O/P NEW MOD 45 MIN: CPT | Performed by: ANESTHESIOLOGY

## 2022-04-14 RX ORDER — TIZANIDINE 2 MG/1
TABLET ORAL
Qty: 60 TABLET | Refills: 0 | Status: SHIPPED | OUTPATIENT
Start: 2022-04-14 | End: 2022-08-22

## 2022-04-14 RX ORDER — ME-TETRAHYDROFOLATE/B12/HRB236 1-1-500 MG
1 CAPSULE ORAL DAILY
Qty: 90 CAPSULE | Refills: 1 | Status: SHIPPED | OUTPATIENT
Start: 2022-04-14 | End: 2022-08-31

## 2022-05-02 ENCOUNTER — OUTSIDE FACILITY SERVICE (OUTPATIENT)
Dept: PAIN MEDICINE | Facility: CLINIC | Age: 27
End: 2022-05-02

## 2022-05-02 PROCEDURE — 64493 INJ PARAVERT F JNT L/S 1 LEV: CPT | Performed by: ANESTHESIOLOGY

## 2022-05-02 PROCEDURE — 99152 MOD SED SAME PHYS/QHP 5/>YRS: CPT | Performed by: ANESTHESIOLOGY

## 2022-05-02 PROCEDURE — 64494 INJ PARAVERT F JNT L/S 2 LEV: CPT | Performed by: ANESTHESIOLOGY

## 2022-05-03 ENCOUNTER — LAB (OUTPATIENT)
Dept: LAB | Facility: HOSPITAL | Age: 27
End: 2022-05-03

## 2022-05-03 ENCOUNTER — OFFICE VISIT (OUTPATIENT)
Dept: ENDOCRINOLOGY | Facility: CLINIC | Age: 27
End: 2022-05-03

## 2022-05-03 ENCOUNTER — TELEPHONE (OUTPATIENT)
Dept: PAIN MEDICINE | Facility: CLINIC | Age: 27
End: 2022-05-03

## 2022-05-03 VITALS
BODY MASS INDEX: 26.83 KG/M2 | WEIGHT: 187.4 LBS | HEART RATE: 73 BPM | SYSTOLIC BLOOD PRESSURE: 128 MMHG | DIASTOLIC BLOOD PRESSURE: 70 MMHG | HEIGHT: 70 IN | OXYGEN SATURATION: 98 %

## 2022-05-03 DIAGNOSIS — M25.562 CHRONIC PAIN OF LEFT KNEE: ICD-10-CM

## 2022-05-03 DIAGNOSIS — E23.0 HYPOGONADOTROPIC HYPOGONADISM: Primary | ICD-10-CM

## 2022-05-03 DIAGNOSIS — G89.29 CHRONIC PAIN OF LEFT KNEE: ICD-10-CM

## 2022-05-03 DIAGNOSIS — Z12.5 SCREENING FOR PROSTATE CANCER: ICD-10-CM

## 2022-05-03 LAB
BASOPHILS # BLD AUTO: 0.03 10*3/MM3 (ref 0–0.2)
BASOPHILS NFR BLD AUTO: 0.2 % (ref 0–1.5)
DEPRECATED RDW RBC AUTO: 41.6 FL (ref 37–54)
EOSINOPHIL # BLD AUTO: 0.04 10*3/MM3 (ref 0–0.4)
EOSINOPHIL NFR BLD AUTO: 0.2 % (ref 0.3–6.2)
ERYTHROCYTE [DISTWIDTH] IN BLOOD BY AUTOMATED COUNT: 12.8 % (ref 12.3–15.4)
HCT VFR BLD AUTO: 53 % (ref 37.5–51)
HGB BLD-MCNC: 17.9 G/DL (ref 13–17.7)
IMM GRANULOCYTES # BLD AUTO: 0.1 10*3/MM3 (ref 0–0.05)
IMM GRANULOCYTES NFR BLD AUTO: 0.6 % (ref 0–0.5)
LYMPHOCYTES # BLD AUTO: 2.23 10*3/MM3 (ref 0.7–3.1)
LYMPHOCYTES NFR BLD AUTO: 13.4 % (ref 19.6–45.3)
MCH RBC QN AUTO: 30 PG (ref 26.6–33)
MCHC RBC AUTO-ENTMCNC: 33.8 G/DL (ref 31.5–35.7)
MCV RBC AUTO: 88.9 FL (ref 79–97)
MONOCYTES # BLD AUTO: 0.79 10*3/MM3 (ref 0.1–0.9)
MONOCYTES NFR BLD AUTO: 4.8 % (ref 5–12)
NEUTROPHILS NFR BLD AUTO: 13.42 10*3/MM3 (ref 1.7–7)
NEUTROPHILS NFR BLD AUTO: 80.8 % (ref 42.7–76)
NRBC BLD AUTO-RTO: 0 /100 WBC (ref 0–0.2)
PLATELET # BLD AUTO: 210 10*3/MM3 (ref 140–450)
PMV BLD AUTO: 12.7 FL (ref 6–12)
PSA SERPL-MCNC: 0.51 NG/ML (ref 0–4)
RBC # BLD AUTO: 5.96 10*6/MM3 (ref 4.14–5.8)
T4 FREE SERPL-MCNC: 0.93 NG/DL (ref 0.93–1.7)
TESTOST SERPL-MCNC: 188 NG/DL (ref 249–836)
TSH SERPL DL<=0.05 MIU/L-ACNC: 0.75 UIU/ML (ref 0.27–4.2)
WBC NRBC COR # BLD: 16.61 10*3/MM3 (ref 3.4–10.8)

## 2022-05-03 PROCEDURE — 84402 ASSAY OF FREE TESTOSTERONE: CPT | Performed by: INTERNAL MEDICINE

## 2022-05-03 PROCEDURE — 84439 ASSAY OF FREE THYROXINE: CPT | Performed by: INTERNAL MEDICINE

## 2022-05-03 PROCEDURE — 84403 ASSAY OF TOTAL TESTOSTERONE: CPT | Performed by: INTERNAL MEDICINE

## 2022-05-03 PROCEDURE — 80061 LIPID PANEL: CPT | Performed by: INTERNAL MEDICINE

## 2022-05-03 PROCEDURE — 99213 OFFICE O/P EST LOW 20 MIN: CPT | Performed by: INTERNAL MEDICINE

## 2022-05-03 PROCEDURE — 80050 GENERAL HEALTH PANEL: CPT | Performed by: INTERNAL MEDICINE

## 2022-05-03 PROCEDURE — G0103 PSA SCREENING: HCPCS | Performed by: INTERNAL MEDICINE

## 2022-05-03 NOTE — ASSESSMENT & PLAN NOTE
Check cmp and cbc, check total and free T and psa   Discussed options for management   His insurance will not cover oral supplementation   Long acting supplementation discussed
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
## 2022-05-03 NOTE — TELEPHONE ENCOUNTER
Spoke with pt regarding yesterday’s procedure with Dr. Tam. Pt stated they are doing well, with no complaints. Advised of f/u appointment. Pt understood, and no further needs expressed

## 2022-05-04 LAB
ALBUMIN SERPL-MCNC: 5.1 G/DL (ref 3.5–5.2)
ALBUMIN/GLOB SERPL: 1.4 G/DL
ALP SERPL-CCNC: 88 U/L (ref 39–117)
ALT SERPL W P-5'-P-CCNC: 35 U/L (ref 1–41)
ANION GAP SERPL CALCULATED.3IONS-SCNC: 13.1 MMOL/L (ref 5–15)
AST SERPL-CCNC: 37 U/L (ref 1–40)
BILIRUB SERPL-MCNC: 1 MG/DL (ref 0–1.2)
BUN SERPL-MCNC: 18 MG/DL (ref 6–20)
BUN/CREAT SERPL: 23.7 (ref 7–25)
CALCIUM SPEC-SCNC: 9.8 MG/DL (ref 8.6–10.5)
CHLORIDE SERPL-SCNC: 100 MMOL/L (ref 98–107)
CHOLEST SERPL-MCNC: 196 MG/DL (ref 0–200)
CO2 SERPL-SCNC: 22.9 MMOL/L (ref 22–29)
CREAT SERPL-MCNC: 0.76 MG/DL (ref 0.76–1.27)
EGFRCR SERPLBLD CKD-EPI 2021: 125.6 ML/MIN/1.73
GLOBULIN UR ELPH-MCNC: 3.6 GM/DL
GLUCOSE SERPL-MCNC: 108 MG/DL (ref 65–99)
HDLC SERPL-MCNC: 39 MG/DL (ref 40–60)
LDLC SERPL CALC-MCNC: 120 MG/DL (ref 0–100)
LDLC/HDLC SERPL: 2.96 {RATIO}
POTASSIUM SERPL-SCNC: 3.9 MMOL/L (ref 3.5–5.2)
PROT SERPL-MCNC: 8.7 G/DL (ref 6–8.5)
SODIUM SERPL-SCNC: 136 MMOL/L (ref 136–145)
TRIGL SERPL-MCNC: 207 MG/DL (ref 0–150)
VLDLC SERPL-MCNC: 37 MG/DL (ref 5–40)

## 2022-05-06 LAB — TESTOST FREE SERPL-MCNC: 3.5 PG/ML (ref 9.3–26.5)

## 2022-06-23 NOTE — TELEPHONE ENCOUNTER
On call     Spoke to patient last night regarding his lower blood pressure patient states  he has a dialysis and  his blood pressure goes down and today after checking couple times.   It was 80/50   patient states he feels somewhat lightheaded , but overall pretty good , patient states he is also very hungry as well now   recommend patient to increase the water intake at least 3 to 4 glasses of water today also avoid taking his  bp medications on the day of dialysis can also hold on furosemide tonight - as well   Recheck  bp   Recommend to discuss with his PCP as well as the nephrologist  Regarding this issue patient has   Patient will call back if any lower blood pressure throughout the night  If any persistent worsening symptoms or patient becomes more lightheaded and weak to call 911 And go to emergency room   Patient agrees with plan verbalized understanding compliance PT CALLED REQUESTING TO GO OVER LAB RESULTS THE PT HAD DONE LAST WEEK. PT WANTED TO SPEAK WITH DR. BAEZ'S MA AND HAD A FEW QUESTIONS. BEST CALL BACK # [phone]

## 2022-06-27 ENCOUNTER — OFFICE VISIT (OUTPATIENT)
Dept: PAIN MEDICINE | Facility: CLINIC | Age: 27
End: 2022-06-27

## 2022-06-27 VITALS
OXYGEN SATURATION: 98 % | TEMPERATURE: 96.6 F | DIASTOLIC BLOOD PRESSURE: 82 MMHG | SYSTOLIC BLOOD PRESSURE: 135 MMHG | BODY MASS INDEX: 25.86 KG/M2 | HEIGHT: 70 IN | WEIGHT: 180.6 LBS | HEART RATE: 83 BPM

## 2022-06-27 DIAGNOSIS — M47.816 SPONDYLOSIS OF LUMBAR REGION WITHOUT MYELOPATHY OR RADICULOPATHY: ICD-10-CM

## 2022-06-27 DIAGNOSIS — Z87.828 HISTORY OF MOTOR VEHICLE ACCIDENT: ICD-10-CM

## 2022-06-27 DIAGNOSIS — M51.37 DEGENERATION OF LUMBAR OR LUMBOSACRAL INTERVERTEBRAL DISC: ICD-10-CM

## 2022-06-27 DIAGNOSIS — M25.562 CHRONIC PAIN OF LEFT KNEE: ICD-10-CM

## 2022-06-27 DIAGNOSIS — G89.29 CHRONIC PAIN OF LEFT KNEE: ICD-10-CM

## 2022-06-27 DIAGNOSIS — M53.87 CHRONIC OR OLD STRAIN OF LUMBOSACRAL SPINE: ICD-10-CM

## 2022-06-27 PROCEDURE — 99213 OFFICE O/P EST LOW 20 MIN: CPT | Performed by: NURSE PRACTITIONER

## 2022-06-27 RX ORDER — BACLOFEN 10 MG/1
10 TABLET ORAL 3 TIMES DAILY
Qty: 90 TABLET | Refills: 0 | Status: SHIPPED | OUTPATIENT
Start: 2022-06-27 | End: 2022-11-15
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
## 2022-07-12 DIAGNOSIS — E23.0 HYPOGONADOTROPIC HYPOGONADISM: ICD-10-CM

## 2022-07-12 RX ORDER — TESTOSTERONE ENANTHATE 200 MG/ML
INJECTION, SOLUTION INTRAMUSCULAR
Qty: 6 ML | Refills: 1 | Status: SHIPPED | OUTPATIENT
Start: 2022-07-12 | End: 2022-11-15

## 2022-07-15 ENCOUNTER — TELEMEDICINE (OUTPATIENT)
Dept: INTERNAL MEDICINE | Facility: CLINIC | Age: 27
End: 2022-07-15

## 2022-07-15 DIAGNOSIS — E78.5 HYPERLIPIDEMIA, UNSPECIFIED HYPERLIPIDEMIA TYPE: Primary | ICD-10-CM

## 2022-07-15 DIAGNOSIS — E23.0 HYPOGONADOTROPIC HYPOGONADISM: ICD-10-CM

## 2022-07-15 DIAGNOSIS — D72.829 LEUKOCYTOSIS, UNSPECIFIED TYPE: ICD-10-CM

## 2022-07-15 PROCEDURE — 99214 OFFICE O/P EST MOD 30 MIN: CPT | Performed by: PHYSICIAN ASSISTANT

## 2022-07-15 NOTE — PROGRESS NOTES
"Chief Complaint   Patient presents with   • Abnormal Lab       Subjective   Blade Hall is a 27 y.o. male.       History of Present Illness     This was an audio and video enabled telemedicine encounter.    Pt had labs done in May by Endocrinologist. He had elevated white count and RBC thought to be related to recent steroid shot and testosterone replacement. He also was not fasting for the labs and his cholesterol was elevated. He was instructed to discuss with Dr. Bull at his upcoming follow up but he became anxious and did not think he could wait that long.     He has stopped his testosterone because of the lab abnormalities. Having SE of headaches and anxiety because of this.      Current Outpatient Medications:   •  atenolol (TENORMIN) 25 MG tablet, Take 1 tablet by mouth 2 (two) times a day. (Patient taking differently: Take 25 mg by mouth Daily.), Disp: 180 tablet, Rfl: 1  •  baclofen (LIORESAL) 10 MG tablet, Take 1 tablet by mouth 3 (Three) Times a Day. Start 1/2 tab QHS, then 1/2 tab TID PRN muscle spasms, if tolerated, 1 tab TID PRN, Disp: 90 tablet, Rfl: 0  •  cyclobenzaprine (FLEXERIL) 10 MG tablet, Take 1 tablet by mouth At Night As Needed for Muscle Spasms., Disp: 10 tablet, Rfl: 0  •  Dietary Management Product (Rheumate) capsule, Take 1 capsule by mouth Daily., Disp: 90 capsule, Rfl: 1  •  FLUoxetine (PROzac) 20 MG capsule, Take 1 capsule by mouth Daily., Disp: 90 capsule, Rfl: 1  •  Gel Base gel, 2 g 4 (Four) Times a Day. prilocaine 2%, lidocaine 10%, capsaicin 0.001% and mannitol 20%, Disp: 240 g, Rfl: 5  •  ibuprofen (ADVIL,MOTRIN) 600 MG tablet, Take 1 tablet by mouth Every 8 (Eight) Hours As Needed for Mild Pain ., Disp: 60 tablet, Rfl: 2  •  omeprazole (priLOSEC) 20 MG capsule, TAKE ONE CAPSULE BY MOUTH DAILY, Disp: 90 capsule, Rfl: 1  •  Syringe/Needle, Disp, (B-D 3CC LUER-RENETTA SYR 69VW7-9/2) 21G X 1-1/2\" 3 ML misc, Use 2 per month to administer testosterone, Disp: 13 each, Rfl: 3  •  " Testosterone Enanthate 200 MG/ML solution, INJECT 1 MILLILITER INTRAMUSCULARLY EVERY 14 DAYS, Disp: 6 mL, Rfl: 1  •  tiZANidine (ZANAFLEX) 2 MG tablet, 1/2 to 1 tablet three times per day as needed for muscle spasms, Disp: 60 tablet, Rfl: 0    Current Facility-Administered Medications:   •  testosterone cypionate (DEPO-TESTOSTERONE) injection solution 200 mg, 200 mg, Intramuscular, Once, Roxanna Diaz MD     [facility]  The following portions of the patient's history were reviewed and updated as appropriate: allergies, current medications, past family history, past medical history, past social history, past surgical history and problem list.    Review of Systems   Constitutional: Negative for chills, fever and unexpected weight change.   HENT: Negative.    Eyes: Negative for pain and visual disturbance.   Respiratory: Negative for chest tightness and shortness of breath.    Cardiovascular: Negative for chest pain.   Gastrointestinal: Negative for abdominal pain and blood in stool.   Endocrine: Negative.    Genitourinary: Negative.    Musculoskeletal: Negative for joint swelling.   Skin: Negative for color change, rash and wound.   Allergic/Immunologic: Negative.    Neurological: Negative for syncope and speech difficulty.   Hematological: Negative for adenopathy.   Psychiatric/Behavioral: Positive for decreased concentration. Negative for confusion, hallucinations and suicidal ideas. The patient is nervous/anxious.        Objective   There were no vitals taken for this visit.    Physical Exam  Constitutional:       Appearance: He is well-developed.   HENT:      Head: Normocephalic and atraumatic.      Right Ear: External ear normal.      Left Ear: External ear normal.   Eyes:      Conjunctiva/sclera: Conjunctivae normal.   Pulmonary:      Effort: Pulmonary effort is normal.   Musculoskeletal:         General: Normal range of motion.      Cervical back: Normal range of motion.   Skin:     General: Skin is warm  and dry.   Psychiatric:         Behavior: Behavior normal.         Results for orders placed or performed in visit on 05/03/22   Comprehensive Metabolic Panel    Specimen: Blood   Result Value Ref Range    Glucose 108 (H) 65 - 99 mg/dL    BUN 18 6 - 20 mg/dL    Creatinine 0.76 0.76 - 1.27 mg/dL    Sodium 136 136 - 145 mmol/L    Potassium 3.9 3.5 - 5.2 mmol/L    Chloride 100 98 - 107 mmol/L    CO2 22.9 22.0 - 29.0 mmol/L    Calcium 9.8 8.6 - 10.5 mg/dL    Total Protein 8.7 (H) 6.0 - 8.5 g/dL    Albumin 5.10 3.50 - 5.20 g/dL    ALT (SGPT) 35 1 - 41 U/L    AST (SGOT) 37 1 - 40 U/L    Alkaline Phosphatase 88 39 - 117 U/L    Total Bilirubin 1.0 0.0 - 1.2 mg/dL    Globulin 3.6 gm/dL    A/G Ratio 1.4 g/dL    BUN/Creatinine Ratio 23.7 7.0 - 25.0    Anion Gap 13.1 5.0 - 15.0 mmol/L    eGFR 125.6 >60.0 mL/min/1.73   CBC Auto Differential    Specimen: Blood   Result Value Ref Range    WBC 16.61 (H) 3.40 - 10.80 10*3/mm3    RBC 5.96 (H) 4.14 - 5.80 10*6/mm3    Hemoglobin 17.9 (H) 13.0 - 17.7 g/dL    Hematocrit 53.0 (H) 37.5 - 51.0 %    MCV 88.9 79.0 - 97.0 fL    MCH 30.0 26.6 - 33.0 pg    MCHC 33.8 31.5 - 35.7 g/dL    RDW 12.8 12.3 - 15.4 %    RDW-SD 41.6 37.0 - 54.0 fl    MPV 12.7 (H) 6.0 - 12.0 fL    Platelets 210 140 - 450 10*3/mm3    Neutrophil % 80.8 (H) 42.7 - 76.0 %    Lymphocyte % 13.4 (L) 19.6 - 45.3 %    Monocyte % 4.8 (L) 5.0 - 12.0 %    Eosinophil % 0.2 (L) 0.3 - 6.2 %    Basophil % 0.2 0.0 - 1.5 %    Immature Grans % 0.6 (H) 0.0 - 0.5 %    Neutrophils, Absolute 13.42 (H) 1.70 - 7.00 10*3/mm3    Lymphocytes, Absolute 2.23 0.70 - 3.10 10*3/mm3    Monocytes, Absolute 0.79 0.10 - 0.90 10*3/mm3    Eosinophils, Absolute 0.04 0.00 - 0.40 10*3/mm3    Basophils, Absolute 0.03 0.00 - 0.20 10*3/mm3    Immature Grans, Absolute 0.10 (H) 0.00 - 0.05 10*3/mm3    nRBC 0.0 0.0 - 0.2 /100 WBC   TSH    Specimen: Blood   Result Value Ref Range    TSH 0.752 0.270 - 4.200 uIU/mL   T4, Free    Specimen: Blood   Result Value Ref Range     Free T4 0.93 0.93 - 1.70 ng/dL   Lipid Panel    Specimen: Blood   Result Value Ref Range    Total Cholesterol 196 0 - 200 mg/dL    Triglycerides 207 (H) 0 - 150 mg/dL    HDL Cholesterol 39 (L) 40 - 60 mg/dL    LDL Cholesterol  120 (H) 0 - 100 mg/dL    VLDL Cholesterol 37 5 - 40 mg/dL    LDL/HDL Ratio 2.96    Testosterone    Specimen: Blood   Result Value Ref Range    Testosterone, Total 188.00 (L) 249.00 - 836.00 ng/dL   Testosterone Free Direct    Specimen: Blood   Result Value Ref Range    Testosterone, Free 3.5 (L) 9.3 - 26.5 pg/mL   PSA Screen    Specimen: Blood   Result Value Ref Range    PSA 0.505 0.000 - 4.000 ng/mL        ASSESSMENT/PLAN    Diagnoses and all orders for this visit:    1. Hyperlipidemia, unspecified hyperlipidemia type (Primary)  Comments:  Levels sightly abnormal, recheck when fasting. Further recs based on results.  Orders:  -     Lipid Panel; Future    2. Leukocytosis, unspecified type  Comments:  Likely secondary to steroid shot he had had prior to labs. Pt will stop by for recheck.  Orders:  -     CBC & Differential; Future    3. Hypogonadotropic hypogonadism (HCC)  Comments:  Recheck lab abnormalities. If returning to normal, pt will discuss restarting testosterone with his Endocrinologist.             Return if symptoms worsen or fail to improve, for Next scheduled follow up.

## 2022-07-18 ENCOUNTER — LAB (OUTPATIENT)
Dept: LAB | Facility: HOSPITAL | Age: 27
End: 2022-07-18

## 2022-07-18 DIAGNOSIS — E78.5 HYPERLIPIDEMIA, UNSPECIFIED HYPERLIPIDEMIA TYPE: ICD-10-CM

## 2022-07-18 DIAGNOSIS — D72.829 LEUKOCYTOSIS, UNSPECIFIED TYPE: ICD-10-CM

## 2022-07-18 LAB
BASOPHILS # BLD AUTO: 0.1 10*3/MM3 (ref 0–0.2)
BASOPHILS NFR BLD AUTO: 1.2 % (ref 0–1.5)
CHOLEST SERPL-MCNC: 209 MG/DL (ref 0–200)
DEPRECATED RDW RBC AUTO: 39.4 FL (ref 37–54)
EOSINOPHIL # BLD AUTO: 0.16 10*3/MM3 (ref 0–0.4)
EOSINOPHIL NFR BLD AUTO: 1.9 % (ref 0.3–6.2)
ERYTHROCYTE [DISTWIDTH] IN BLOOD BY AUTOMATED COUNT: 12.9 % (ref 12.3–15.4)
HCT VFR BLD AUTO: 47.7 % (ref 37.5–51)
HDLC SERPL-MCNC: 41 MG/DL (ref 40–60)
HGB BLD-MCNC: 16.4 G/DL (ref 13–17.7)
IMM GRANULOCYTES # BLD AUTO: 0.03 10*3/MM3 (ref 0–0.05)
IMM GRANULOCYTES NFR BLD AUTO: 0.4 % (ref 0–0.5)
LDLC SERPL CALC-MCNC: 139 MG/DL (ref 0–100)
LDLC/HDLC SERPL: 3.32 {RATIO}
LYMPHOCYTES # BLD AUTO: 3.62 10*3/MM3 (ref 0.7–3.1)
LYMPHOCYTES NFR BLD AUTO: 42.7 % (ref 19.6–45.3)
MCH RBC QN AUTO: 29.4 PG (ref 26.6–33)
MCHC RBC AUTO-ENTMCNC: 34.4 G/DL (ref 31.5–35.7)
MCV RBC AUTO: 85.5 FL (ref 79–97)
MONOCYTES # BLD AUTO: 0.64 10*3/MM3 (ref 0.1–0.9)
MONOCYTES NFR BLD AUTO: 7.6 % (ref 5–12)
NEUTROPHILS NFR BLD AUTO: 3.92 10*3/MM3 (ref 1.7–7)
NEUTROPHILS NFR BLD AUTO: 46.2 % (ref 42.7–76)
NRBC BLD AUTO-RTO: 0 /100 WBC (ref 0–0.2)
PLATELET # BLD AUTO: 194 10*3/MM3 (ref 140–450)
PMV BLD AUTO: 12.2 FL (ref 6–12)
RBC # BLD AUTO: 5.58 10*6/MM3 (ref 4.14–5.8)
TRIGL SERPL-MCNC: 160 MG/DL (ref 0–150)
VLDLC SERPL-MCNC: 29 MG/DL (ref 5–40)
WBC NRBC COR # BLD: 8.47 10*3/MM3 (ref 3.4–10.8)

## 2022-07-18 PROCEDURE — 80061 LIPID PANEL: CPT

## 2022-07-18 PROCEDURE — 85025 COMPLETE CBC W/AUTO DIFF WBC: CPT

## 2022-07-18 NOTE — PROGRESS NOTES
I have reviewed the notes, assessments, and/or procedures performed by Angelique Ellison PA-C, I concur with her/his documentation of Blade Hall.

## 2022-07-19 NOTE — PROGRESS NOTES
Your blood counts are back to normal. You can discuss with your Endocrinologist how to restart your testosterone since you had stopped it.    Your cholesterol does remain elevated. Please work on adjusting your diet by reducing carbohydrates (bread, rice, pasta, sweets, sugary drinks, alcohol, etc) to bring these levels back down. No need for medication right now, just work on lifestyle changes.

## 2022-08-22 ENCOUNTER — OFFICE VISIT (OUTPATIENT)
Dept: INTERNAL MEDICINE | Facility: CLINIC | Age: 27
End: 2022-08-22

## 2022-08-22 ENCOUNTER — LAB (OUTPATIENT)
Dept: LAB | Facility: HOSPITAL | Age: 27
End: 2022-08-22

## 2022-08-22 VITALS
OXYGEN SATURATION: 97 % | BODY MASS INDEX: 26.83 KG/M2 | HEIGHT: 70 IN | DIASTOLIC BLOOD PRESSURE: 80 MMHG | WEIGHT: 187.4 LBS | TEMPERATURE: 97.7 F | SYSTOLIC BLOOD PRESSURE: 122 MMHG | HEART RATE: 63 BPM

## 2022-08-22 DIAGNOSIS — M51.37 DEGENERATION OF LUMBAR OR LUMBOSACRAL INTERVERTEBRAL DISC: ICD-10-CM

## 2022-08-22 DIAGNOSIS — K21.9 GASTROESOPHAGEAL REFLUX DISEASE WITHOUT ESOPHAGITIS: ICD-10-CM

## 2022-08-22 DIAGNOSIS — Z00.00 ROUTINE GENERAL MEDICAL EXAMINATION AT A HEALTH CARE FACILITY: ICD-10-CM

## 2022-08-22 DIAGNOSIS — Z00.00 ROUTINE GENERAL MEDICAL EXAMINATION AT A HEALTH CARE FACILITY: Primary | ICD-10-CM

## 2022-08-22 DIAGNOSIS — F41.1 GENERALIZED ANXIETY DISORDER: ICD-10-CM

## 2022-08-22 LAB
BILIRUB BLD-MCNC: NEGATIVE MG/DL
CLARITY, POC: CLEAR
COLOR UR: ABNORMAL
DEPRECATED RDW RBC AUTO: 39.3 FL (ref 37–54)
ERYTHROCYTE [DISTWIDTH] IN BLOOD BY AUTOMATED COUNT: 13 % (ref 12.3–15.4)
EXPIRATION DATE: ABNORMAL
GLUCOSE UR STRIP-MCNC: NEGATIVE MG/DL
HCT VFR BLD AUTO: 46.8 % (ref 37.5–51)
HGB BLD-MCNC: 16.1 G/DL (ref 13–17.7)
KETONES UR QL: NEGATIVE
LEUKOCYTE EST, POC: NEGATIVE
Lab: ABNORMAL
MCH RBC QN AUTO: 29.1 PG (ref 26.6–33)
MCHC RBC AUTO-ENTMCNC: 34.4 G/DL (ref 31.5–35.7)
MCV RBC AUTO: 84.5 FL (ref 79–97)
NITRITE UR-MCNC: NEGATIVE MG/ML
PH UR: 6 [PH] (ref 5–8)
PLATELET # BLD AUTO: 195 10*3/MM3 (ref 140–450)
PMV BLD AUTO: 11.8 FL (ref 6–12)
PROT UR STRIP-MCNC: NEGATIVE MG/DL
RBC # BLD AUTO: 5.54 10*6/MM3 (ref 4.14–5.8)
RBC # UR STRIP: NEGATIVE /UL
SP GR UR: 1.01 (ref 1–1.03)
UROBILINOGEN UR QL: NORMAL
WBC NRBC COR # BLD: 7.47 10*3/MM3 (ref 3.4–10.8)

## 2022-08-22 PROCEDURE — 82607 VITAMIN B-12: CPT

## 2022-08-22 PROCEDURE — 80061 LIPID PANEL: CPT

## 2022-08-22 PROCEDURE — 99395 PREV VISIT EST AGE 18-39: CPT | Performed by: INTERNAL MEDICINE

## 2022-08-22 PROCEDURE — 81003 URINALYSIS AUTO W/O SCOPE: CPT | Performed by: INTERNAL MEDICINE

## 2022-08-22 PROCEDURE — 80050 GENERAL HEALTH PANEL: CPT

## 2022-08-22 PROCEDURE — 36415 COLL VENOUS BLD VENIPUNCTURE: CPT

## 2022-08-22 RX ORDER — MELOXICAM 7.5 MG/1
7.5 TABLET ORAL DAILY
Qty: 30 TABLET | Refills: 5 | Status: SHIPPED | OUTPATIENT
Start: 2022-08-22 | End: 2022-11-15

## 2022-08-22 RX ORDER — OMEPRAZOLE 20 MG/1
20 CAPSULE, DELAYED RELEASE ORAL DAILY
Qty: 90 CAPSULE | Refills: 3 | Status: SHIPPED | OUTPATIENT
Start: 2022-08-22 | End: 2023-03-20

## 2022-08-22 RX ORDER — FLUOXETINE HYDROCHLORIDE 20 MG/1
20 CAPSULE ORAL DAILY
Qty: 90 CAPSULE | Refills: 3 | Status: SHIPPED | OUTPATIENT
Start: 2022-08-22
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
## 2022-08-23 LAB
ALBUMIN SERPL-MCNC: 4.9 G/DL (ref 3.5–5.2)
ALBUMIN/GLOB SERPL: 1.6 G/DL
ALP SERPL-CCNC: 64 U/L (ref 39–117)
ALT SERPL W P-5'-P-CCNC: 24 U/L (ref 1–41)
ANION GAP SERPL CALCULATED.3IONS-SCNC: 14.8 MMOL/L (ref 5–15)
AST SERPL-CCNC: 40 U/L (ref 1–40)
BILIRUB SERPL-MCNC: 0.8 MG/DL (ref 0–1.2)
BUN SERPL-MCNC: 13 MG/DL (ref 6–20)
BUN/CREAT SERPL: 17.3 (ref 7–25)
CALCIUM SPEC-SCNC: 9.5 MG/DL (ref 8.6–10.5)
CHLORIDE SERPL-SCNC: 101 MMOL/L (ref 98–107)
CHOLEST SERPL-MCNC: 188 MG/DL (ref 0–200)
CO2 SERPL-SCNC: 24.2 MMOL/L (ref 22–29)
CREAT SERPL-MCNC: 0.75 MG/DL (ref 0.76–1.27)
EGFRCR SERPLBLD CKD-EPI 2021: 125.3 ML/MIN/1.73
GLOBULIN UR ELPH-MCNC: 3.1 GM/DL
GLUCOSE SERPL-MCNC: 73 MG/DL (ref 65–99)
HDLC SERPL-MCNC: 33 MG/DL (ref 40–60)
LDLC SERPL CALC-MCNC: 130 MG/DL (ref 0–100)
LDLC/HDLC SERPL: 3.85 {RATIO}
POTASSIUM SERPL-SCNC: 4.4 MMOL/L (ref 3.5–5.2)
PROT SERPL-MCNC: 8 G/DL (ref 6–8.5)
SODIUM SERPL-SCNC: 140 MMOL/L (ref 136–145)
TRIGL SERPL-MCNC: 139 MG/DL (ref 0–150)
TSH SERPL DL<=0.05 MIU/L-ACNC: 1.09 UIU/ML (ref 0.27–4.2)
VIT B12 BLD-MCNC: 1235 PG/ML (ref 211–946)
VLDLC SERPL-MCNC: 25 MG/DL (ref 5–40)

## 2022-08-30 DIAGNOSIS — M47.816 SPONDYLOSIS OF LUMBAR REGION WITHOUT MYELOPATHY OR RADICULOPATHY: ICD-10-CM

## 2022-08-31 RX ORDER — ME-TETRAHYDROFOLATE/B12/HRB236 1-1-500 MG
1 CAPSULE ORAL DAILY
Qty: 90 CAPSULE | Refills: 0 | Status: SHIPPED | OUTPATIENT
Start: 2022-08-31 | End: 2023-02-22

## 2022-10-11 ENCOUNTER — CLINICAL SUPPORT (OUTPATIENT)
Dept: ENDOCRINOLOGY | Facility: CLINIC | Age: 27
End: 2022-10-11

## 2022-10-11 DIAGNOSIS — E23.0 HYPOGONADOTROPIC HYPOGONADISM: ICD-10-CM

## 2022-10-11 PROCEDURE — 96372 THER/PROPH/DIAG INJ SC/IM: CPT | Performed by: INTERNAL MEDICINE

## 2022-10-11 RX ADMIN — TESTOSTERONE CYPIONATE 200 MG: 100 INJECTION, SOLUTION INTRAMUSCULAR at 17:08

## 2022-11-15 ENCOUNTER — OFFICE VISIT (OUTPATIENT)
Dept: ENDOCRINOLOGY | Facility: CLINIC | Age: 27
End: 2022-11-15

## 2022-11-15 VITALS
OXYGEN SATURATION: 99 % | SYSTOLIC BLOOD PRESSURE: 124 MMHG | HEIGHT: 70 IN | WEIGHT: 183.6 LBS | BODY MASS INDEX: 26.28 KG/M2 | DIASTOLIC BLOOD PRESSURE: 72 MMHG | HEART RATE: 78 BPM

## 2022-11-15 DIAGNOSIS — E23.0 HYPOGONADOTROPIC HYPOGONADISM: Primary | ICD-10-CM

## 2022-11-15 PROCEDURE — 99213 OFFICE O/P EST LOW 20 MIN: CPT | Performed by: INTERNAL MEDICINE

## 2022-11-15 RX ORDER — TESTOSTERONE UNDECANOATE 237 MG/1
237 CAPSULE, LIQUID FILLED ORAL 2 TIMES DAILY
Qty: 60 CAPSULE | Refills: 5 | Status: SHIPPED | OUTPATIENT
Start: 2022-11-15

## 2022-11-15 NOTE — ASSESSMENT & PLAN NOTE
On long term testosterone therapy now having issues with injections (intolerance)  Has also tried and failed topical  rx jatenzo sent  copay card provided  Recent normal cbc, psa  Low T levels   F/u 4-6 months
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
## 2022-12-30 DIAGNOSIS — E23.0 HYPOGONADOTROPIC HYPOGONADISM: Primary | ICD-10-CM

## 2022-12-30 RX ORDER — TESTOSTERONE ENANTHATE 200 MG/ML
INJECTION, SOLUTION INTRAMUSCULAR
Qty: 5 ML | OUTPATIENT
Start: 2022-12-30

## 2023-01-03 RX ORDER — TESTOSTERONE ENANTHATE 200 MG/ML
INJECTION, SOLUTION INTRAMUSCULAR
Qty: 10 ML | Refills: 0 | Status: SHIPPED | OUTPATIENT
Start: 2023-01-03

## 2023-01-04 NOTE — TELEPHONE ENCOUNTER
LOV 11-15-22  -23    Insurance does not cover oral testosterone so pt requesting rx for injectable    Contract on file  
Pt called back voice understood message from Allyson. I told pt he needs to reach out to Insurance company to see why testosterone oral is not covered. I told pt to call us back to let us know what was said from insurance company.  
Pt called requesting a prescription for testosterone enanthate 200 mg prescription was denied. I do see a prescription sent in on 11/15/22 for testosterone undecanoate 237 mg. Please contact pt. Pt next f/u 05/16/23  
no vomiting/no fever/no diarrhea

## 2023-01-10 ENCOUNTER — PRIOR AUTHORIZATION (OUTPATIENT)
Dept: ENDOCRINOLOGY | Facility: CLINIC | Age: 28
End: 2023-01-10
Payer: COMMERCIAL

## 2023-02-22 ENCOUNTER — LAB (OUTPATIENT)
Dept: LAB | Facility: HOSPITAL | Age: 28
End: 2023-02-22
Payer: COMMERCIAL

## 2023-02-22 ENCOUNTER — OFFICE VISIT (OUTPATIENT)
Dept: INTERNAL MEDICINE | Facility: CLINIC | Age: 28
End: 2023-02-22
Payer: COMMERCIAL

## 2023-02-22 VITALS
HEART RATE: 85 BPM | SYSTOLIC BLOOD PRESSURE: 138 MMHG | TEMPERATURE: 98.2 F | WEIGHT: 180 LBS | BODY MASS INDEX: 25.77 KG/M2 | OXYGEN SATURATION: 98 % | DIASTOLIC BLOOD PRESSURE: 82 MMHG | HEIGHT: 70 IN

## 2023-02-22 DIAGNOSIS — R19.7 DIARRHEA, UNSPECIFIED TYPE: Primary | ICD-10-CM

## 2023-02-22 DIAGNOSIS — E23.0 HYPOGONADOTROPIC HYPOGONADISM: ICD-10-CM

## 2023-02-22 DIAGNOSIS — R19.7 DIARRHEA, UNSPECIFIED TYPE: ICD-10-CM

## 2023-02-22 LAB
ALBUMIN SERPL-MCNC: 4.9 G/DL (ref 3.5–5.2)
ALBUMIN/GLOB SERPL: 1.8 G/DL
ALP SERPL-CCNC: 66 U/L (ref 39–117)
ALT SERPL W P-5'-P-CCNC: 24 U/L (ref 1–41)
ANION GAP SERPL CALCULATED.3IONS-SCNC: 11.6 MMOL/L (ref 5–15)
AST SERPL-CCNC: 30 U/L (ref 1–40)
BASOPHILS # BLD AUTO: 0.07 10*3/MM3 (ref 0–0.2)
BASOPHILS NFR BLD AUTO: 0.8 % (ref 0–1.5)
BILIRUB SERPL-MCNC: 1 MG/DL (ref 0–1.2)
BUN SERPL-MCNC: 14 MG/DL (ref 6–20)
BUN/CREAT SERPL: 16.3 (ref 7–25)
CALCIUM SPEC-SCNC: 9.8 MG/DL (ref 8.6–10.5)
CHLORIDE SERPL-SCNC: 101 MMOL/L (ref 98–107)
CO2 SERPL-SCNC: 25.4 MMOL/L (ref 22–29)
CREAT SERPL-MCNC: 0.86 MG/DL (ref 0.76–1.27)
DEPRECATED RDW RBC AUTO: 40.5 FL (ref 37–54)
EGFRCR SERPLBLD CKD-EPI 2021: 120.2 ML/MIN/1.73
EOSINOPHIL # BLD AUTO: 0.18 10*3/MM3 (ref 0–0.4)
EOSINOPHIL NFR BLD AUTO: 1.9 % (ref 0.3–6.2)
ERYTHROCYTE [DISTWIDTH] IN BLOOD BY AUTOMATED COUNT: 12.8 % (ref 12.3–15.4)
GLOBULIN UR ELPH-MCNC: 2.8 GM/DL
GLUCOSE SERPL-MCNC: 86 MG/DL (ref 65–99)
HCT VFR BLD AUTO: 51.5 % (ref 37.5–51)
HGB BLD-MCNC: 17.6 G/DL (ref 13–17.7)
IMM GRANULOCYTES # BLD AUTO: 0.02 10*3/MM3 (ref 0–0.05)
IMM GRANULOCYTES NFR BLD AUTO: 0.2 % (ref 0–0.5)
LYMPHOCYTES # BLD AUTO: 2.16 10*3/MM3 (ref 0.7–3.1)
LYMPHOCYTES NFR BLD AUTO: 23.4 % (ref 19.6–45.3)
MCH RBC QN AUTO: 29.6 PG (ref 26.6–33)
MCHC RBC AUTO-ENTMCNC: 34.2 G/DL (ref 31.5–35.7)
MCV RBC AUTO: 86.7 FL (ref 79–97)
MONOCYTES # BLD AUTO: 0.85 10*3/MM3 (ref 0.1–0.9)
MONOCYTES NFR BLD AUTO: 9.2 % (ref 5–12)
NEUTROPHILS NFR BLD AUTO: 5.96 10*3/MM3 (ref 1.7–7)
NEUTROPHILS NFR BLD AUTO: 64.5 % (ref 42.7–76)
NRBC BLD AUTO-RTO: 0 /100 WBC (ref 0–0.2)
PLATELET # BLD AUTO: 201 10*3/MM3 (ref 140–450)
PMV BLD AUTO: 12.2 FL (ref 6–12)
POTASSIUM SERPL-SCNC: 4.3 MMOL/L (ref 3.5–5.2)
PROT SERPL-MCNC: 7.7 G/DL (ref 6–8.5)
RBC # BLD AUTO: 5.94 10*6/MM3 (ref 4.14–5.8)
SODIUM SERPL-SCNC: 138 MMOL/L (ref 136–145)
WBC NRBC COR # BLD: 9.24 10*3/MM3 (ref 3.4–10.8)

## 2023-02-22 PROCEDURE — 87209 SMEAR COMPLEX STAIN: CPT

## 2023-02-22 PROCEDURE — 99213 OFFICE O/P EST LOW 20 MIN: CPT | Performed by: INTERNAL MEDICINE

## 2023-02-22 PROCEDURE — 80053 COMPREHEN METABOLIC PANEL: CPT

## 2023-02-22 PROCEDURE — 96372 THER/PROPH/DIAG INJ SC/IM: CPT | Performed by: INTERNAL MEDICINE

## 2023-02-22 PROCEDURE — 87177 OVA AND PARASITES SMEARS: CPT

## 2023-02-22 PROCEDURE — 85025 COMPLETE CBC W/AUTO DIFF WBC: CPT

## 2023-02-22 RX ADMIN — TESTOSTERONE CYPIONATE 200 MG: 200 INJECTION, SOLUTION INTRAMUSCULAR at 07:05

## 2023-02-22 NOTE — PROGRESS NOTES
"Depression, Heartburn, and Allergies    Subjective   Blade Hall is a 27 y.o. male is here today for follow-up.    History of Present Illness   Blade Hall is a 27-year-old male who presents today for a 6-month follow-up.    The patient states that he is doing well. He denies any recent changes to his medication. He is still taking testosterone injections. He notes that he occasionally feels like the injections are effective and other times they are not working. He reports that he feels energetic and that he has good days and bad days.    He complains of persistent loose diarrhea. He reports that he feels like there is a \"worm\" coming out when he defecates. He was previously diagnosed with hemorrhoids. He was prescribed a hemorrhoid cream, and his symptoms have improved. He denies any pain. He adds that he eats a lot of fiber. He reports that he gets the urge to have a bowel movement approximately 10 to 15 minutes after eating. He reports he eats a lot and denies any weight gain. He states he can eat 6 to 7000 calories a day. His diarrhea is worse with consuming apples and oatmeal.    He notes his stress levels are not high. His blood pressure is slightly elevated today. He denies working out today. He notes that he did not sleep well last night.    He complains of allergy symptoms. He complains of nasal congestion. He denies taking any medication for his allergies. He adds that he sneezes a lot.      Current Outpatient Medications:   •  FLUoxetine (PROzac) 20 MG capsule, Take 1 capsule by mouth Daily., Disp: 90 capsule, Rfl: 3  •  omeprazole (priLOSEC) 20 MG capsule, Take 1 capsule by mouth Daily., Disp: 90 capsule, Rfl: 3  •  Syringe/Needle, Disp, (B-D 3CC LUER-RENETTA SYR 66ZA5-1/2) 21G X 1-1/2\" 3 ML misc, Use 2 per month to administer testosterone, Disp: 13 each, Rfl: 3  •  Testosterone Enanthate 200 MG/ML solution, INJECT 1 MILLILITER INTRAMUSCULARLY EVERY 14 DAYS, Disp: 10 mL, Rfl: 0  •  Testosterone Undecanoate " "(Jatenzo) 237 MG capsule, Take 237 mg by mouth 2 (Two) Times a Day., Disp: 60 capsule, Rfl: 5  •  atenolol (TENORMIN) 25 MG tablet, Take 1 tablet by mouth 2 (two) times a day. (Patient taking differently: Take 25 mg by mouth Daily.), Disp: 180 tablet, Rfl: 1    Current Facility-Administered Medications:   •  Testosterone Cypionate (DEPOTESTOTERONE CYPIONATE) injection 200 mg, 200 mg, Intramuscular, Once, Bre Bull MD      The following portions of the patient's history were reviewed and updated as appropriate: allergies, current medications, past family history, past medical history, past social history, past surgical history and problem list.    Review of Systems   Gastrointestinal: Positive for diarrhea.       Objective   /82   Pulse 85   Temp 98.2 °F (36.8 °C)   Ht 177.8 cm (70\")   Wt 81.6 kg (180 lb)   SpO2 98% Comment: ra  BMI 25.83 kg/m²   Physical Exam  Vitals and nursing note reviewed.   Constitutional:       Appearance: He is well-developed.   HENT:      Head: Normocephalic and atraumatic.      Right Ear: External ear normal.      Left Ear: External ear normal.      Mouth/Throat:      Pharynx: No oropharyngeal exudate.   Eyes:      Conjunctiva/sclera: Conjunctivae normal.      Pupils: Pupils are equal, round, and reactive to light.   Neck:      Thyroid: No thyromegaly.   Cardiovascular:      Rate and Rhythm: Normal rate and regular rhythm.      Pulses: Normal pulses.      Heart sounds: Normal heart sounds. No murmur heard.    No friction rub. No gallop.   Pulmonary:      Effort: Pulmonary effort is normal.      Breath sounds: Normal breath sounds.   Abdominal:      General: Bowel sounds are normal. There is no distension.      Palpations: Abdomen is soft.      Tenderness: There is no abdominal tenderness.   Musculoskeletal:      Cervical back: Neck supple.   Skin:     General: Skin is warm and dry.   Neurological:      Mental Status: He is alert and oriented to person, place, and time. "      Cranial Nerves: No cranial nerve deficit.   Psychiatric:         Judgment: Judgment normal.      left tympanic membrane is red  Abdomen sounds okay and he denies pain with palpation, he reports feeling pressure.      Results for orders placed or performed in visit on 08/22/22   CBC (No Diff)    Specimen: Blood   Result Value Ref Range    WBC 7.47 3.40 - 10.80 10*3/mm3    RBC 5.54 4.14 - 5.80 10*6/mm3    Hemoglobin 16.1 13.0 - 17.7 g/dL    Hematocrit 46.8 37.5 - 51.0 %    MCV 84.5 79.0 - 97.0 fL    MCH 29.1 26.6 - 33.0 pg    MCHC 34.4 31.5 - 35.7 g/dL    RDW 13.0 12.3 - 15.4 %    RDW-SD 39.3 37.0 - 54.0 fl    MPV 11.8 6.0 - 12.0 fL    Platelets 195 140 - 450 10*3/mm3   Comprehensive Metabolic Panel    Specimen: Blood   Result Value Ref Range    Glucose 73 65 - 99 mg/dL    BUN 13 6 - 20 mg/dL    Creatinine 0.75 (L) 0.76 - 1.27 mg/dL    Sodium 140 136 - 145 mmol/L    Potassium 4.4 3.5 - 5.2 mmol/L    Chloride 101 98 - 107 mmol/L    CO2 24.2 22.0 - 29.0 mmol/L    Calcium 9.5 8.6 - 10.5 mg/dL    Total Protein 8.0 6.0 - 8.5 g/dL    Albumin 4.90 3.50 - 5.20 g/dL    ALT (SGPT) 24 1 - 41 U/L    AST (SGOT) 40 1 - 40 U/L    Alkaline Phosphatase 64 39 - 117 U/L    Total Bilirubin 0.8 0.0 - 1.2 mg/dL    Globulin 3.1 gm/dL    A/G Ratio 1.6 g/dL    BUN/Creatinine Ratio 17.3 7.0 - 25.0    Anion Gap 14.8 5.0 - 15.0 mmol/L    eGFR 125.3 >60.0 mL/min/1.73   Lipid Panel    Specimen: Blood   Result Value Ref Range    Total Cholesterol 188 0 - 200 mg/dL    Triglycerides 139 0 - 150 mg/dL    HDL Cholesterol 33 (L) 40 - 60 mg/dL    LDL Cholesterol  130 (H) 0 - 100 mg/dL    VLDL Cholesterol 25 5 - 40 mg/dL    LDL/HDL Ratio 3.85    TSH    Specimen: Blood   Result Value Ref Range    TSH 1.090 0.270 - 4.200 uIU/mL   Vitamin B12    Specimen: Blood   Result Value Ref Range    Vitamin B-12 1,235 (H) 211 - 946 pg/mL             Assessment & Plan   Diagnoses and all orders for this visit:    Diarrhea, unspecified type  -     Ova & Parasite  Examination - Stool, Per Rectum; Future    Hypogonadotropic hypogonadism (HCC)  -     CBC & Differential; Future  -     Comprehensive Metabolic Panel; Future  -     Testosterone Cypionate (DEPOTESTOTERONE CYPIONATE) injection 200 mg    1. Health maintenance  - Routine lab work will be obtained for further evaluation.  - He will receive a testosterone injection today.    2. Diarrhea  - Stool sample will be obtained for further evaluation.  - Advised the patient to follow a FODMAP diet.  Literature on FODMAP diet given             Return for Next scheduled follow up.    Electronically signed by:    Bre Bull MD   Transcribed from ambient dictation for Bre Bull MD by Rubina Graves.  02/22/23   13:52 EST    Patient or patient representative verbalized consent to the visit recording.  I have personally performed the services described in this document as transcribed by the above individual, and it is both accurate and complete.  Bre Bull MD  2/23/2023  19:43 EST

## 2023-02-23 RX ORDER — TESTOSTERONE CYPIONATE 200 MG/ML
200 INJECTION, SOLUTION INTRAMUSCULAR ONCE
Status: COMPLETED | OUTPATIENT
Start: 2023-02-23 | End: 2023-02-22

## 2023-03-05 LAB
O+P SPEC MICRO: NORMAL
O+P STL TRI STN: NORMAL

## 2023-03-20 ENCOUNTER — LAB (OUTPATIENT)
Dept: LAB | Facility: HOSPITAL | Age: 28
End: 2023-03-20
Payer: COMMERCIAL

## 2023-03-20 ENCOUNTER — OFFICE VISIT (OUTPATIENT)
Dept: INTERNAL MEDICINE | Facility: CLINIC | Age: 28
End: 2023-03-20
Payer: COMMERCIAL

## 2023-03-20 VITALS
TEMPERATURE: 97.8 F | WEIGHT: 180 LBS | SYSTOLIC BLOOD PRESSURE: 122 MMHG | HEART RATE: 65 BPM | DIASTOLIC BLOOD PRESSURE: 72 MMHG | BODY MASS INDEX: 25.77 KG/M2 | HEIGHT: 70 IN | OXYGEN SATURATION: 97 %

## 2023-03-20 DIAGNOSIS — K21.9 GASTROESOPHAGEAL REFLUX DISEASE WITHOUT ESOPHAGITIS: ICD-10-CM

## 2023-03-20 DIAGNOSIS — K52.9 CHRONIC DIARRHEA: ICD-10-CM

## 2023-03-20 DIAGNOSIS — E23.0 HYPOGONADOTROPIC HYPOGONADISM: Primary | ICD-10-CM

## 2023-03-20 DIAGNOSIS — Z00.00 ROUTINE GENERAL MEDICAL EXAMINATION AT A HEALTH CARE FACILITY: ICD-10-CM

## 2023-03-20 LAB
DEPRECATED RDW RBC AUTO: 38.5 FL (ref 37–54)
ERYTHROCYTE [DISTWIDTH] IN BLOOD BY AUTOMATED COUNT: 12.5 % (ref 12.3–15.4)
HCT VFR BLD AUTO: 51.4 % (ref 37.5–51)
HGB BLD-MCNC: 18 G/DL (ref 13–17.7)
MCH RBC QN AUTO: 30.2 PG (ref 26.6–33)
MCHC RBC AUTO-ENTMCNC: 35 G/DL (ref 31.5–35.7)
MCV RBC AUTO: 86.2 FL (ref 79–97)
PLATELET # BLD AUTO: 201 10*3/MM3 (ref 140–450)
PMV BLD AUTO: 12.1 FL (ref 6–12)
RBC # BLD AUTO: 5.96 10*6/MM3 (ref 4.14–5.8)
WBC NRBC COR # BLD: 8.91 10*3/MM3 (ref 3.4–10.8)

## 2023-03-20 PROCEDURE — 85027 COMPLETE CBC AUTOMATED: CPT

## 2023-03-20 PROCEDURE — 86258 DGP ANTIBODY EACH IG CLASS: CPT

## 2023-03-20 PROCEDURE — 86677 HELICOBACTER PYLORI ANTIBODY: CPT

## 2023-03-20 PROCEDURE — 86364 TISS TRNSGLTMNASE EA IG CLAS: CPT

## 2023-03-20 PROCEDURE — 82784 ASSAY IGA/IGD/IGG/IGM EACH: CPT

## 2023-03-20 PROCEDURE — 86231 EMA EACH IG CLASS: CPT

## 2023-03-20 PROCEDURE — 36415 COLL VENOUS BLD VENIPUNCTURE: CPT

## 2023-03-20 RX ORDER — OMEPRAZOLE 40 MG/1
40 CAPSULE, DELAYED RELEASE ORAL DAILY
Qty: 30 CAPSULE | Refills: 1 | Status: SHIPPED | OUTPATIENT
Start: 2023-03-20

## 2023-03-20 RX ORDER — SYRINGE WITH NEEDLE, 1 ML 25GX5/8"
SYRINGE, EMPTY DISPOSABLE MISCELLANEOUS
Qty: 13 EACH | Refills: 3 | Status: SHIPPED | OUTPATIENT
Start: 2023-03-20

## 2023-03-20 RX ORDER — TESTOSTERONE CYPIONATE 200 MG/ML
200 INJECTION, SOLUTION INTRAMUSCULAR ONCE
Status: COMPLETED | OUTPATIENT
Start: 2023-03-20 | End: 2023-03-20

## 2023-03-20 RX ADMIN — TESTOSTERONE CYPIONATE 200 MG: 200 INJECTION, SOLUTION INTRAMUSCULAR at 13:30

## 2023-03-20 NOTE — PROGRESS NOTES
"Follow-up (On stool studies and diet.  Still having diarrhea)    Subjective   Blade Hall is a 27 y.o. male is here today for follow-up.    History of Present Illness     Mr. Hall complains of ongoing diarrhea postprandially despite making recommended dietary changes such as avoiding milk and glutens. The ova and parasite stool test completed on 02/22/2023 was negative and he no longer experiences pruritus ani. The patient denies noticing stool that is too light in color or very dark in color. No gross hematochezia and no abdominal pain reported.    He also complains of frequent abdominal bloating and significant heartburn, which also occurs with consuming just water. Confirms taking omeprazole as prescribed, over-the-counter probiotics, which he began approximately 10 days ago, and drinking kombucha tea often.    Mr. Hall received the COVID-19 vaccine today.    Current Outpatient Medications:   •  B-D 3CC LUER-RENETTA SYR 85WM2-0/2 21G X 1-1/2\" 3 ML misc, USE 2 PER MONTH TO ADMINISTER TESTOSTERONE, Disp: 13 each, Rfl: 3  •  FLUoxetine (PROzac) 20 MG capsule, Take 1 capsule by mouth Daily., Disp: 90 capsule, Rfl: 3  •  omeprazole (priLOSEC) 40 MG capsule, Take 1 capsule by mouth Daily., Disp: 30 capsule, Rfl: 1  •  Testosterone Enanthate 200 MG/ML solution, INJECT 1 MILLILITER INTRAMUSCULARLY EVERY 14 DAYS, Disp: 10 mL, Rfl: 0  •  riFAXIMin (XIFAXAN) 550 MG tablet, Take 1 tablet by mouth Every 12 (Twelve) Hours., Disp: 12 tablet, Rfl: 0  •  Testosterone Undecanoate (Jatenzo) 237 MG capsule, Take 237 mg by mouth 2 (Two) Times a Day. (Patient not taking: Reported on 3/20/2023), Disp: 60 capsule, Rfl: 5      The following portions of the patient's history were reviewed and updated as appropriate: allergies, current medications, past family history, past medical history, past social history, past surgical history and problem list.    Review of Systems   Constitutional: Negative.  Negative for chills and fever.   HENT: " "Negative for ear discharge, ear pain, sinus pressure and sore throat.    Respiratory: Negative for cough, chest tightness and shortness of breath.    Cardiovascular: Negative for chest pain, palpitations and leg swelling.   Gastrointestinal: Positive for diarrhea. Negative for nausea and vomiting.   Musculoskeletal: Negative for arthralgias, back pain and myalgias.   Neurological: Negative for dizziness, syncope and headaches.   Psychiatric/Behavioral: Negative for confusion and sleep disturbance.          Objective   /72   Pulse 65   Temp 97.8 °F (36.6 °C)   Ht 177.8 cm (70\")   Wt 81.6 kg (180 lb)   SpO2 97% Comment: ra  BMI 25.83 kg/m²   Physical Exam  Vitals and nursing note reviewed.   Constitutional:       Appearance: He is well-developed.   HENT:      Head: Normocephalic and atraumatic.   Eyes:      Conjunctiva/sclera: Conjunctivae normal.      Pupils: Pupils are equal, round, and reactive to light.   Neck:      Thyroid: No thyromegaly.   Cardiovascular:      Rate and Rhythm: Normal rate and regular rhythm.      Pulses: Normal pulses.      Heart sounds: Normal heart sounds. No murmur heard.    No friction rub. No gallop.   Pulmonary:      Effort: Pulmonary effort is normal.      Breath sounds: Normal breath sounds.   Abdominal:      General: Bowel sounds are normal. There is no distension.      Palpations: Abdomen is soft.      Tenderness: There is no abdominal tenderness. There is no guarding or rebound.      Hernia: No hernia is present.   Musculoskeletal:      Cervical back: Neck supple.   Skin:     General: Skin is warm and dry.   Neurological:      Mental Status: He is alert and oriented to person, place, and time.      Cranial Nerves: No cranial nerve deficit.   Psychiatric:         Judgment: Judgment normal.           Results for orders placed or performed in visit on 02/22/23   Ova & Parasite Examination - Stool, Per Rectum    Specimen: Per Rectum; Stool   Result Value Ref Range    Ova + " Parasite Exam No ova or parasites seen on concentrated smear No Ova or Parasites Seen    Trichrome Stain No ova or parasites seen on trichrome stain    Comprehensive Metabolic Panel    Specimen: Blood   Result Value Ref Range    Glucose 86 65 - 99 mg/dL    BUN 14 6 - 20 mg/dL    Creatinine 0.86 0.76 - 1.27 mg/dL    Sodium 138 136 - 145 mmol/L    Potassium 4.3 3.5 - 5.2 mmol/L    Chloride 101 98 - 107 mmol/L    CO2 25.4 22.0 - 29.0 mmol/L    Calcium 9.8 8.6 - 10.5 mg/dL    Total Protein 7.7 6.0 - 8.5 g/dL    Albumin 4.9 3.5 - 5.2 g/dL    ALT (SGPT) 24 1 - 41 U/L    AST (SGOT) 30 1 - 40 U/L    Alkaline Phosphatase 66 39 - 117 U/L    Total Bilirubin 1.0 0.0 - 1.2 mg/dL    Globulin 2.8 gm/dL    A/G Ratio 1.8 g/dL    BUN/Creatinine Ratio 16.3 7.0 - 25.0    Anion Gap 11.6 5.0 - 15.0 mmol/L    eGFR 120.2 >60.0 mL/min/1.73   CBC Auto Differential    Specimen: Blood   Result Value Ref Range    WBC 9.24 3.40 - 10.80 10*3/mm3    RBC 5.94 (H) 4.14 - 5.80 10*6/mm3    Hemoglobin 17.6 13.0 - 17.7 g/dL    Hematocrit 51.5 (H) 37.5 - 51.0 %    MCV 86.7 79.0 - 97.0 fL    MCH 29.6 26.6 - 33.0 pg    MCHC 34.2 31.5 - 35.7 g/dL    RDW 12.8 12.3 - 15.4 %    RDW-SD 40.5 37.0 - 54.0 fl    MPV 12.2 (H) 6.0 - 12.0 fL    Platelets 201 140 - 450 10*3/mm3    Neutrophil % 64.5 42.7 - 76.0 %    Lymphocyte % 23.4 19.6 - 45.3 %    Monocyte % 9.2 5.0 - 12.0 %    Eosinophil % 1.9 0.3 - 6.2 %    Basophil % 0.8 0.0 - 1.5 %    Immature Grans % 0.2 0.0 - 0.5 %    Neutrophils, Absolute 5.96 1.70 - 7.00 10*3/mm3    Lymphocytes, Absolute 2.16 0.70 - 3.10 10*3/mm3    Monocytes, Absolute 0.85 0.10 - 0.90 10*3/mm3    Eosinophils, Absolute 0.18 0.00 - 0.40 10*3/mm3    Basophils, Absolute 0.07 0.00 - 0.20 10*3/mm3    Immature Grans, Absolute 0.02 0.00 - 0.05 10*3/mm3    nRBC 0.0 0.0 - 0.2 /100 WBC             Assessment & Plan   Diagnoses and all orders for this visit:    Hypogonadotropic hypogonadism (HCC)  -     Testosterone Cypionate (DEPOTESTOTERONE CYPIONATE)  injection 200 mg    Gastroesophageal reflux disease without esophagitis  -     Helicobacter Pylori, IgA IgG IgM; Future  -     omeprazole (priLOSEC) 40 MG capsule; Take 1 capsule by mouth Daily.    Chronic diarrhea  -     Celiac Comprehensive Panel; Future  -     riFAXIMin (XIFAXAN) 550 MG tablet; Take 1 tablet by mouth Every 12 (Twelve) Hours.  -     Ambulatory Referral to Gastroenterology    samples of xifaxan x 12 pills given, and adv to take BID as above.  Proceed with GI referral for possible egd and c-scope.             Return for Next scheduled follow up.    Electronically signed by:    Bre Bull MD     Transcribed from ambient dictation for Bre Bull MD by Charley Beltre.  03/20/23   13:52 EDT    Patient or patient representative verbalized consent to the visit recording.

## 2023-03-21 LAB
ENDOMYSIUM IGA SER QL: NEGATIVE
GLIADIN PEPTIDE IGA SER-ACNC: 4 UNITS (ref 0–19)
GLIADIN PEPTIDE IGG SER-ACNC: 2 UNITS (ref 0–19)
H PYLORI IGA SER-ACNC: <9 UNITS (ref 0–8.9)
H PYLORI IGG SER IA-ACNC: 0.18 INDEX VALUE (ref 0–0.79)
H PYLORI IGM SER-ACNC: <9 UNITS (ref 0–8.9)
IGA SERPL-MCNC: 252 MG/DL (ref 90–386)
TTG IGA SER-ACNC: <2 U/ML (ref 0–3)
TTG IGG SER-ACNC: <2 U/ML (ref 0–5)

## 2023-05-13 DIAGNOSIS — E23.0 HYPOGONADOTROPIC HYPOGONADISM: ICD-10-CM

## 2023-05-16 ENCOUNTER — OFFICE VISIT (OUTPATIENT)
Dept: ENDOCRINOLOGY | Facility: CLINIC | Age: 28
End: 2023-05-16
Payer: COMMERCIAL

## 2023-05-16 VITALS
DIASTOLIC BLOOD PRESSURE: 64 MMHG | HEIGHT: 70 IN | HEART RATE: 86 BPM | OXYGEN SATURATION: 98 % | SYSTOLIC BLOOD PRESSURE: 108 MMHG | BODY MASS INDEX: 26.03 KG/M2 | WEIGHT: 181.8 LBS

## 2023-05-16 DIAGNOSIS — E23.0 HYPOGONADOTROPIC HYPOGONADISM: ICD-10-CM

## 2023-05-16 DIAGNOSIS — Z12.5 SCREENING FOR PROSTATE CANCER: Primary | ICD-10-CM

## 2023-05-16 LAB
ALBUMIN SERPL-MCNC: 4.8 G/DL (ref 3.5–5.2)
ALBUMIN/GLOB SERPL: 1.7 G/DL
ALP SERPL-CCNC: 57 U/L (ref 39–117)
ALT SERPL W P-5'-P-CCNC: 27 U/L (ref 1–41)
ANION GAP SERPL CALCULATED.3IONS-SCNC: 11.1 MMOL/L (ref 5–15)
AST SERPL-CCNC: 28 U/L (ref 1–40)
BASOPHILS # BLD AUTO: 0.06 10*3/MM3 (ref 0–0.2)
BASOPHILS NFR BLD AUTO: 0.7 % (ref 0–1.5)
BILIRUB SERPL-MCNC: 0.8 MG/DL (ref 0–1.2)
BUN SERPL-MCNC: 17 MG/DL (ref 6–20)
BUN/CREAT SERPL: 23 (ref 7–25)
CALCIUM SPEC-SCNC: 9.7 MG/DL (ref 8.6–10.5)
CHLORIDE SERPL-SCNC: 99 MMOL/L (ref 98–107)
CO2 SERPL-SCNC: 26.9 MMOL/L (ref 22–29)
CREAT SERPL-MCNC: 0.74 MG/DL (ref 0.76–1.27)
DEPRECATED RDW RBC AUTO: 39.5 FL (ref 37–54)
EGFRCR SERPLBLD CKD-EPI 2021: 125.8 ML/MIN/1.73
EOSINOPHIL # BLD AUTO: 0.18 10*3/MM3 (ref 0–0.4)
EOSINOPHIL NFR BLD AUTO: 2.1 % (ref 0.3–6.2)
ERYTHROCYTE [DISTWIDTH] IN BLOOD BY AUTOMATED COUNT: 12.7 % (ref 12.3–15.4)
GLOBULIN UR ELPH-MCNC: 2.8 GM/DL
GLUCOSE SERPL-MCNC: 84 MG/DL (ref 65–99)
HCT VFR BLD AUTO: 47.8 % (ref 37.5–51)
HGB BLD-MCNC: 16.3 G/DL (ref 13–17.7)
IMM GRANULOCYTES # BLD AUTO: 0.02 10*3/MM3 (ref 0–0.05)
IMM GRANULOCYTES NFR BLD AUTO: 0.2 % (ref 0–0.5)
IRON 24H UR-MRATE: 66 MCG/DL (ref 59–158)
LYMPHOCYTES # BLD AUTO: 3.95 10*3/MM3 (ref 0.7–3.1)
LYMPHOCYTES NFR BLD AUTO: 46.5 % (ref 19.6–45.3)
MCH RBC QN AUTO: 29.6 PG (ref 26.6–33)
MCHC RBC AUTO-ENTMCNC: 34.1 G/DL (ref 31.5–35.7)
MCV RBC AUTO: 86.8 FL (ref 79–97)
MONOCYTES # BLD AUTO: 0.57 10*3/MM3 (ref 0.1–0.9)
MONOCYTES NFR BLD AUTO: 6.7 % (ref 5–12)
NEUTROPHILS NFR BLD AUTO: 3.72 10*3/MM3 (ref 1.7–7)
NEUTROPHILS NFR BLD AUTO: 43.8 % (ref 42.7–76)
NRBC BLD AUTO-RTO: 0 /100 WBC (ref 0–0.2)
PLATELET # BLD AUTO: 188 10*3/MM3 (ref 140–450)
PMV BLD AUTO: 12 FL (ref 6–12)
POTASSIUM SERPL-SCNC: 3.8 MMOL/L (ref 3.5–5.2)
PROT SERPL-MCNC: 7.6 G/DL (ref 6–8.5)
RBC # BLD AUTO: 5.51 10*6/MM3 (ref 4.14–5.8)
SODIUM SERPL-SCNC: 137 MMOL/L (ref 136–145)
WBC NRBC COR # BLD: 8.5 10*3/MM3 (ref 3.4–10.8)

## 2023-05-16 PROCEDURE — 84402 ASSAY OF FREE TESTOSTERONE: CPT | Performed by: INTERNAL MEDICINE

## 2023-05-16 PROCEDURE — 85025 COMPLETE CBC W/AUTO DIFF WBC: CPT | Performed by: INTERNAL MEDICINE

## 2023-05-16 PROCEDURE — 80053 COMPREHEN METABOLIC PANEL: CPT | Performed by: INTERNAL MEDICINE

## 2023-05-16 PROCEDURE — 99214 OFFICE O/P EST MOD 30 MIN: CPT | Performed by: INTERNAL MEDICINE

## 2023-05-16 PROCEDURE — 84403 ASSAY OF TOTAL TESTOSTERONE: CPT | Performed by: INTERNAL MEDICINE

## 2023-05-16 PROCEDURE — 83540 ASSAY OF IRON: CPT | Performed by: INTERNAL MEDICINE

## 2023-05-16 PROCEDURE — G0103 PSA SCREENING: HCPCS | Performed by: INTERNAL MEDICINE

## 2023-05-16 RX ORDER — TESTOSTERONE UNDECANOATE 237 MG/1
237 CAPSULE, LIQUID FILLED ORAL 2 TIMES DAILY
Qty: 60 CAPSULE | Refills: 5 | Status: SHIPPED | OUTPATIENT
Start: 2023-05-16

## 2023-05-16 RX ORDER — TESTOSTERONE ENANTHATE 200 MG/ML
200 INJECTION, SOLUTION INTRAMUSCULAR
Qty: 10 ML | Refills: 1 | Status: SHIPPED | OUTPATIENT
Start: 2023-05-16 | End: 2023-05-18

## 2023-05-16 RX ORDER — TESTOSTERONE ENANTHATE 200 MG/ML
INJECTION, SOLUTION INTRAMUSCULAR
Qty: 5 ML
Start: 2023-05-16

## 2023-05-16 NOTE — ASSESSMENT & PLAN NOTE
Polycythemia assoc with injectable testosterone   Feels like beneficial for first 3 days after injection then feels tired, low libido  Would benefit from oral supplement  Discussed topical use but has small child at home- risk of transfer to child and wife discussed  F/u 6 months - will try to get jatenzo approved due to lack of benefit/side effects with injection, risk assoc with topical
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
## 2023-05-17 LAB
PSA SERPL-MCNC: 0.56 NG/ML (ref 0–4)
TESTOST SERPL-MCNC: 249 NG/DL (ref 249–836)

## 2023-05-18 DIAGNOSIS — E23.0 HYPOGONADOTROPIC HYPOGONADISM: ICD-10-CM

## 2023-05-18 RX ORDER — TESTOSTERONE ENANTHATE 200 MG/ML
200 INJECTION, SOLUTION INTRAMUSCULAR
Qty: 10 ML | Refills: 1 | Status: SHIPPED | OUTPATIENT
Start: 2023-05-18

## 2023-05-19 LAB — TESTOST FREE SERPL-MCNC: 11.9 PG/ML (ref 9.3–26.5)

## 2023-06-01 ENCOUNTER — PRIOR AUTHORIZATION (OUTPATIENT)
Dept: ENDOCRINOLOGY | Facility: CLINIC | Age: 28
End: 2023-06-01

## 2023-07-22 ENCOUNTER — TELEPHONE (OUTPATIENT)
Dept: INTERNAL MEDICINE | Facility: CLINIC | Age: 28
End: 2023-07-22
Payer: COMMERCIAL

## 2023-07-22 NOTE — TELEPHONE ENCOUNTER
PATIENT CALLED AND STATED AT HIS APPOINT WITH DR BAEZ 7/21/2023 WAS GIVEN UBRELVY SAMPLES AND WAS WANTING TO KNOW IF HE COULD HAVE A RX FOR UBREVLY.

## 2023-07-25 NOTE — TELEPHONE ENCOUNTER
Called patient and advised of appointment.  Patient states he has an appointment on 08/10/2023 and will discuss with Dr. Bull at that appt.

## 2023-07-25 NOTE — TELEPHONE ENCOUNTER
If his headaches are persisting after stopping the medication, needs to come back in to decide how to best manage daily headaches.

## 2023-08-01 ENCOUNTER — OFFICE VISIT (OUTPATIENT)
Dept: INTERNAL MEDICINE | Facility: CLINIC | Age: 28
End: 2023-08-01
Payer: COMMERCIAL

## 2023-08-01 VITALS
DIASTOLIC BLOOD PRESSURE: 82 MMHG | SYSTOLIC BLOOD PRESSURE: 148 MMHG | HEIGHT: 70 IN | TEMPERATURE: 98.5 F | WEIGHT: 181 LBS | OXYGEN SATURATION: 97 % | HEART RATE: 62 BPM | BODY MASS INDEX: 25.91 KG/M2

## 2023-08-01 DIAGNOSIS — R51.9 INTRACTABLE HEADACHE, UNSPECIFIED CHRONICITY PATTERN, UNSPECIFIED HEADACHE TYPE: ICD-10-CM

## 2023-08-01 DIAGNOSIS — J01.10 SUBACUTE FRONTAL SINUSITIS: Primary | ICD-10-CM

## 2023-08-01 RX ORDER — METHYLPREDNISOLONE ACETATE 40 MG/ML
40 INJECTION, SUSPENSION INTRA-ARTICULAR; INTRALESIONAL; INTRAMUSCULAR; SOFT TISSUE ONCE
Status: COMPLETED | OUTPATIENT
Start: 2023-08-01 | End: 2023-08-01

## 2023-08-01 RX ORDER — AMOXICILLIN AND CLAVULANATE POTASSIUM 875; 125 MG/1; MG/1
1 TABLET, FILM COATED ORAL 2 TIMES DAILY
Qty: 20 TABLET | Refills: 0 | Status: SHIPPED | OUTPATIENT
Start: 2023-08-01

## 2023-08-01 RX ORDER — METHYLPREDNISOLONE 4 MG/1
TABLET ORAL
Qty: 21 TABLET | Refills: 0 | Status: SHIPPED | OUTPATIENT
Start: 2023-08-01

## 2023-08-01 RX ORDER — FLUTICASONE PROPIONATE 50 MCG
2 SPRAY, SUSPENSION (ML) NASAL DAILY
Qty: 16 G | Refills: 0 | Status: SHIPPED | OUTPATIENT
Start: 2023-08-01

## 2023-08-01 RX ORDER — TESTOSTERONE UNDECANOATE 237 MG/1
CAPSULE, LIQUID FILLED ORAL
COMMUNITY

## 2023-08-01 RX ADMIN — METHYLPREDNISOLONE ACETATE 40 MG: 40 INJECTION, SUSPENSION INTRA-ARTICULAR; INTRALESIONAL; INTRAMUSCULAR; SOFT TISSUE at 15:15

## 2023-08-17 ENCOUNTER — TELEPHONE (OUTPATIENT)
Dept: ENDOCRINOLOGY | Facility: CLINIC | Age: 28
End: 2023-08-17
Payer: COMMERCIAL

## 2023-08-17 DIAGNOSIS — E23.0 HYPOGONADOTROPIC HYPOGONADISM: Primary | ICD-10-CM

## 2023-08-17 NOTE — TELEPHONE ENCOUNTER
Pt wanted to update Dr Diaz on his condition  He did see Angelique Ellison and Dr Bull for  headaches.  He had a CT scan and then was treated for sinus infection with antibiotics and medrol dose pack and he has not had any more headaches.  He restarted the Jatenzo and is doing well so far on it.  He does not want to take the testosterone injections if he can continue on the Jatenzo  He is scheduled for FU here in November and is asking if he needs lab levels done before that considering he is now on the Jatenzo     Please advise

## 2023-08-18 RX ORDER — TESTOSTERONE UNDECANOATE 237 MG/1
237 CAPSULE, LIQUID FILLED ORAL 2 TIMES DAILY
Qty: 60 CAPSULE | Refills: 5 | Status: SHIPPED | OUTPATIENT
Start: 2023-08-18

## 2023-08-18 NOTE — TELEPHONE ENCOUNTER
LMOM to advise pt rx was sent to pharmacy and no need to do labs before the OV in November and to call back if needed or questions

## 2023-08-18 NOTE — TELEPHONE ENCOUNTER
Rx Refill Note    Requested Prescriptions     Signed Prescriptions Disp Refills    Testosterone Undecanoate (Jatenzo) 237 MG capsule 60 capsule 5     Sig: Take 237 mg by mouth 2 (Two) Times a Day.     Authorizing Provider: ANISHA DE LEÓN        Last office visit with prescribing clinician: 5/16/2023     Last telemedicine visit with prescribing clinician: Visit date not found     Next office visit with prescribing clinician: 11/7/2023   {    Allyson Duenas MA  08/18/23, 13:33 EDT

## 2023-09-03 DIAGNOSIS — J01.10 SUBACUTE FRONTAL SINUSITIS: ICD-10-CM

## 2023-09-03 DIAGNOSIS — R51.9 INTRACTABLE HEADACHE, UNSPECIFIED CHRONICITY PATTERN, UNSPECIFIED HEADACHE TYPE: ICD-10-CM

## 2023-09-04 RX ORDER — FLUTICASONE PROPIONATE 50 MCG
SPRAY, SUSPENSION (ML) NASAL
Qty: 16 G | Refills: 0 | Status: SHIPPED | OUTPATIENT
Start: 2023-09-04

## 2023-09-15 DIAGNOSIS — F41.1 GENERALIZED ANXIETY DISORDER: ICD-10-CM

## 2023-09-15 RX ORDER — FLUOXETINE HYDROCHLORIDE 20 MG/1
CAPSULE ORAL
Qty: 90 CAPSULE | Refills: 0 | Status: SHIPPED | OUTPATIENT
Start: 2023-09-15

## 2023-11-07 ENCOUNTER — OFFICE VISIT (OUTPATIENT)
Dept: ENDOCRINOLOGY | Facility: CLINIC | Age: 28
End: 2023-11-07
Payer: COMMERCIAL

## 2023-11-07 VITALS
WEIGHT: 176 LBS | SYSTOLIC BLOOD PRESSURE: 132 MMHG | OXYGEN SATURATION: 98 % | HEART RATE: 92 BPM | BODY MASS INDEX: 25.2 KG/M2 | DIASTOLIC BLOOD PRESSURE: 90 MMHG | HEIGHT: 70 IN

## 2023-11-07 DIAGNOSIS — E23.0 HYPOGONADOTROPIC HYPOGONADISM: ICD-10-CM

## 2023-11-07 LAB
ALBUMIN SERPL-MCNC: 4.8 G/DL (ref 3.5–5.2)
ALBUMIN/GLOB SERPL: 1.5 G/DL
ALP SERPL-CCNC: 56 U/L (ref 39–117)
ALT SERPL W P-5'-P-CCNC: 22 U/L (ref 1–41)
ANION GAP SERPL CALCULATED.3IONS-SCNC: 11.3 MMOL/L (ref 5–15)
AST SERPL-CCNC: 25 U/L (ref 1–40)
BASOPHILS # BLD AUTO: 0.06 10*3/MM3 (ref 0–0.2)
BASOPHILS NFR BLD AUTO: 0.9 % (ref 0–1.5)
BILIRUB SERPL-MCNC: 0.8 MG/DL (ref 0–1.2)
BUN SERPL-MCNC: 13 MG/DL (ref 6–20)
BUN/CREAT SERPL: 14.9 (ref 7–25)
CALCIUM SPEC-SCNC: 9.7 MG/DL (ref 8.6–10.5)
CHLORIDE SERPL-SCNC: 102 MMOL/L (ref 98–107)
CO2 SERPL-SCNC: 25.7 MMOL/L (ref 22–29)
CREAT SERPL-MCNC: 0.87 MG/DL (ref 0.76–1.27)
DEPRECATED RDW RBC AUTO: 38.7 FL (ref 37–54)
EGFRCR SERPLBLD CKD-EPI 2021: 119 ML/MIN/1.73
EOSINOPHIL # BLD AUTO: 0.12 10*3/MM3 (ref 0–0.4)
EOSINOPHIL NFR BLD AUTO: 1.8 % (ref 0.3–6.2)
ERYTHROCYTE [DISTWIDTH] IN BLOOD BY AUTOMATED COUNT: 12.3 % (ref 12.3–15.4)
GLOBULIN UR ELPH-MCNC: 3.3 GM/DL
GLUCOSE SERPL-MCNC: 85 MG/DL (ref 65–99)
HCT VFR BLD AUTO: 49.2 % (ref 37.5–51)
HGB BLD-MCNC: 17.3 G/DL (ref 13–17.7)
IMM GRANULOCYTES # BLD AUTO: 0.01 10*3/MM3 (ref 0–0.05)
IMM GRANULOCYTES NFR BLD AUTO: 0.1 % (ref 0–0.5)
LYMPHOCYTES # BLD AUTO: 2.49 10*3/MM3 (ref 0.7–3.1)
LYMPHOCYTES NFR BLD AUTO: 37.1 % (ref 19.6–45.3)
MCH RBC QN AUTO: 30.7 PG (ref 26.6–33)
MCHC RBC AUTO-ENTMCNC: 35.2 G/DL (ref 31.5–35.7)
MCV RBC AUTO: 87.4 FL (ref 79–97)
MONOCYTES # BLD AUTO: 0.52 10*3/MM3 (ref 0.1–0.9)
MONOCYTES NFR BLD AUTO: 7.7 % (ref 5–12)
NEUTROPHILS NFR BLD AUTO: 3.52 10*3/MM3 (ref 1.7–7)
NEUTROPHILS NFR BLD AUTO: 52.4 % (ref 42.7–76)
NRBC BLD AUTO-RTO: 0.1 /100 WBC (ref 0–0.2)
PLATELET # BLD AUTO: 209 10*3/MM3 (ref 140–450)
PMV BLD AUTO: 11.8 FL (ref 6–12)
POTASSIUM SERPL-SCNC: 4.1 MMOL/L (ref 3.5–5.2)
PROT SERPL-MCNC: 8.1 G/DL (ref 6–8.5)
RBC # BLD AUTO: 5.63 10*6/MM3 (ref 4.14–5.8)
SODIUM SERPL-SCNC: 139 MMOL/L (ref 136–145)
TESTOST SERPL-MCNC: 981 NG/DL (ref 249–836)
WBC NRBC COR # BLD: 6.72 10*3/MM3 (ref 3.4–10.8)

## 2023-11-07 PROCEDURE — 85025 COMPLETE CBC W/AUTO DIFF WBC: CPT | Performed by: INTERNAL MEDICINE

## 2023-11-07 PROCEDURE — 84402 ASSAY OF FREE TESTOSTERONE: CPT | Performed by: INTERNAL MEDICINE

## 2023-11-07 PROCEDURE — 99213 OFFICE O/P EST LOW 20 MIN: CPT | Performed by: INTERNAL MEDICINE

## 2023-11-07 PROCEDURE — 84403 ASSAY OF TOTAL TESTOSTERONE: CPT | Performed by: INTERNAL MEDICINE

## 2023-11-07 PROCEDURE — 80053 COMPREHEN METABOLIC PANEL: CPT | Performed by: INTERNAL MEDICINE

## 2023-11-07 RX ORDER — TESTOSTERONE UNDECANOATE 237 MG/1
237 CAPSULE, LIQUID FILLED ORAL 2 TIMES DAILY
Qty: 180 CAPSULE | Refills: 1 | Status: CANCELLED | OUTPATIENT
Start: 2023-11-07

## 2023-11-07 RX ORDER — TESTOSTERONE UNDECANOATE 237 MG/1
237 CAPSULE, LIQUID FILLED ORAL 2 TIMES DAILY
Qty: 60 CAPSULE | Refills: 5 | Status: SHIPPED | OUTPATIENT
Start: 2023-11-07

## 2023-11-07 NOTE — ASSESSMENT & PLAN NOTE
Check Total and Free Testosterone and cbc, doing well on current replacement- mid dose today   F/u 6 months, refill sent
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
## 2023-11-08 DIAGNOSIS — E23.0 HYPOGONADOTROPIC HYPOGONADISM: Primary | ICD-10-CM

## 2023-11-11 LAB — TESTOST FREE SERPL-MCNC: 37.1 PG/ML (ref 8.7–25.1)

## 2023-11-28 ENCOUNTER — OFFICE VISIT (OUTPATIENT)
Dept: INTERNAL MEDICINE | Facility: CLINIC | Age: 28
End: 2023-11-28
Payer: COMMERCIAL

## 2023-11-28 ENCOUNTER — LAB (OUTPATIENT)
Dept: LAB | Facility: HOSPITAL | Age: 28
End: 2023-11-28
Payer: COMMERCIAL

## 2023-11-28 VITALS
BODY MASS INDEX: 25.31 KG/M2 | HEART RATE: 76 BPM | HEIGHT: 70 IN | OXYGEN SATURATION: 97 % | WEIGHT: 176.8 LBS | DIASTOLIC BLOOD PRESSURE: 68 MMHG | TEMPERATURE: 98.5 F | SYSTOLIC BLOOD PRESSURE: 122 MMHG

## 2023-11-28 DIAGNOSIS — Z00.00 ROUTINE GENERAL MEDICAL EXAMINATION AT A HEALTH CARE FACILITY: Primary | ICD-10-CM

## 2023-11-28 DIAGNOSIS — K21.9 GASTROESOPHAGEAL REFLUX DISEASE WITHOUT ESOPHAGITIS: ICD-10-CM

## 2023-11-28 DIAGNOSIS — Z00.00 ROUTINE GENERAL MEDICAL EXAMINATION AT A HEALTH CARE FACILITY: ICD-10-CM

## 2023-11-28 DIAGNOSIS — J30.89 SEASONAL ALLERGIC RHINITIS DUE TO OTHER ALLERGIC TRIGGER: ICD-10-CM

## 2023-11-28 DIAGNOSIS — R51.9 INTRACTABLE HEADACHE, UNSPECIFIED CHRONICITY PATTERN, UNSPECIFIED HEADACHE TYPE: ICD-10-CM

## 2023-11-28 DIAGNOSIS — E55.9 VITAMIN D DEFICIENCY: ICD-10-CM

## 2023-11-28 DIAGNOSIS — R31.9 HEMATURIA, UNSPECIFIED TYPE: ICD-10-CM

## 2023-11-28 DIAGNOSIS — F41.1 GENERALIZED ANXIETY DISORDER: ICD-10-CM

## 2023-11-28 DIAGNOSIS — J01.10 SUBACUTE FRONTAL SINUSITIS: ICD-10-CM

## 2023-11-28 LAB
BILIRUB BLD-MCNC: NEGATIVE MG/DL
CHOLEST SERPL-MCNC: 219 MG/DL (ref 0–200)
CLARITY, POC: CLEAR
COLOR UR: YELLOW
EXPIRATION DATE: ABNORMAL
GLUCOSE UR STRIP-MCNC: NEGATIVE MG/DL
HDLC SERPL-MCNC: 29 MG/DL (ref 40–60)
KETONES UR QL: NEGATIVE
LDLC SERPL CALC-MCNC: 172 MG/DL (ref 0–100)
LDLC/HDLC SERPL: 5.86 {RATIO}
LEUKOCYTE EST, POC: NEGATIVE
Lab: ABNORMAL
NITRITE UR-MCNC: NEGATIVE MG/ML
PH UR: 6 [PH] (ref 5–8)
PROT UR STRIP-MCNC: NEGATIVE MG/DL
RBC # UR STRIP: ABNORMAL /UL
SP GR UR: 1.03 (ref 1–1.03)
TRIGL SERPL-MCNC: 101 MG/DL (ref 0–150)
TSH SERPL DL<=0.05 MIU/L-ACNC: 0.96 UIU/ML (ref 0.27–4.2)
UROBILINOGEN UR QL: NORMAL
VLDLC SERPL-MCNC: 18 MG/DL (ref 5–40)

## 2023-11-28 PROCEDURE — 82306 VITAMIN D 25 HYDROXY: CPT

## 2023-11-28 PROCEDURE — 99395 PREV VISIT EST AGE 18-39: CPT | Performed by: INTERNAL MEDICINE

## 2023-11-28 PROCEDURE — 83036 HEMOGLOBIN GLYCOSYLATED A1C: CPT

## 2023-11-28 PROCEDURE — 80061 LIPID PANEL: CPT

## 2023-11-28 PROCEDURE — 84443 ASSAY THYROID STIM HORMONE: CPT

## 2023-11-28 PROCEDURE — 87086 URINE CULTURE/COLONY COUNT: CPT | Performed by: INTERNAL MEDICINE

## 2023-11-28 PROCEDURE — 82607 VITAMIN B-12: CPT

## 2023-11-28 PROCEDURE — 81003 URINALYSIS AUTO W/O SCOPE: CPT | Performed by: INTERNAL MEDICINE

## 2023-11-28 RX ORDER — FLUTICASONE PROPIONATE 50 MCG
2 SPRAY, SUSPENSION (ML) NASAL DAILY
Qty: 16 G | Refills: 5 | Status: SHIPPED | OUTPATIENT
Start: 2023-11-28

## 2023-11-28 RX ORDER — OMEPRAZOLE 40 MG/1
40 CAPSULE, DELAYED RELEASE ORAL DAILY
Qty: 90 CAPSULE | Refills: 1 | Status: SHIPPED | OUTPATIENT
Start: 2023-11-28

## 2023-11-28 RX ORDER — FLUOXETINE HYDROCHLORIDE 20 MG/1
20 CAPSULE ORAL DAILY
Qty: 90 CAPSULE | Refills: 3 | Status: CANCELLED | OUTPATIENT
Start: 2023-11-28

## 2023-11-28 RX ORDER — FLUOXETINE HYDROCHLORIDE 40 MG/1
40 CAPSULE ORAL DAILY
Qty: 90 CAPSULE | Refills: 1 | Status: SHIPPED | OUTPATIENT
Start: 2023-11-28

## 2023-11-28 RX ORDER — AZITHROMYCIN 250 MG/1
TABLET, FILM COATED ORAL
Qty: 6 TABLET | Refills: 0 | Status: SHIPPED | OUTPATIENT
Start: 2023-11-28 | End: 2023-11-28 | Stop reason: SDUPTHER

## 2023-11-28 RX ORDER — HYDROXYZINE HYDROCHLORIDE 10 MG/1
10 TABLET, FILM COATED ORAL 3 TIMES DAILY PRN
Qty: 30 TABLET | Refills: 2 | Status: SHIPPED | OUTPATIENT
Start: 2023-11-28

## 2023-11-28 RX ORDER — AZITHROMYCIN 250 MG/1
TABLET, FILM COATED ORAL
Qty: 6 TABLET | Refills: 0 | Status: SHIPPED | OUTPATIENT
Start: 2023-11-28
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
## 2023-11-29 ENCOUNTER — TELEPHONE (OUTPATIENT)
Dept: INTERNAL MEDICINE | Facility: CLINIC | Age: 28
End: 2023-11-29
Payer: COMMERCIAL

## 2023-11-29 LAB
25(OH)D3 SERPL-MCNC: 32.8 NG/ML (ref 30–100)
BACTERIA SPEC AEROBE CULT: NO GROWTH
HBA1C MFR BLD: 5.7 % (ref 4.8–5.6)
VIT B12 BLD-MCNC: 610 PG/ML (ref 211–946)

## 2023-11-29 NOTE — TELEPHONE ENCOUNTER
Pt advised, states he will stop in Friday morning.   Alert and oriented to person, place and time, memory intact, behavior appropriate to situation, PERRL.

## 2023-11-29 NOTE — TELEPHONE ENCOUNTER
Bárbara from lab called to advise they had to ca urine micro as they did not have enough urine to test.

## 2023-12-06 ENCOUNTER — CLINICAL SUPPORT (OUTPATIENT)
Dept: INTERNAL MEDICINE | Facility: CLINIC | Age: 28
End: 2023-12-06
Payer: COMMERCIAL

## 2023-12-06 DIAGNOSIS — R31.9 HEMATURIA, UNSPECIFIED TYPE: ICD-10-CM

## 2023-12-06 PROCEDURE — 81001 URINALYSIS AUTO W/SCOPE: CPT | Performed by: INTERNAL MEDICINE

## 2023-12-07 LAB
BACTERIA UR QL AUTO: NORMAL /HPF
BILIRUB UR QL STRIP: NEGATIVE
CLARITY UR: CLEAR
COLOR UR: YELLOW
GLUCOSE UR STRIP-MCNC: NEGATIVE MG/DL
HGB UR QL STRIP.AUTO: NEGATIVE
HYALINE CASTS UR QL AUTO: NORMAL /LPF
KETONES UR QL STRIP: NEGATIVE
LEUKOCYTE ESTERASE UR QL STRIP.AUTO: NEGATIVE
NITRITE UR QL STRIP: NEGATIVE
PH UR STRIP.AUTO: 6.5 [PH] (ref 5–8)
PROT UR QL STRIP: NEGATIVE
RBC # UR STRIP: NORMAL /HPF
REF LAB TEST METHOD: NORMAL
SP GR UR STRIP: 1.01 (ref 1–1.03)
SQUAMOUS #/AREA URNS HPF: NORMAL /HPF
UROBILINOGEN UR QL STRIP: NORMAL
WBC # UR STRIP: NORMAL /HPF

## 2023-12-21 DIAGNOSIS — F41.1 GENERALIZED ANXIETY DISORDER: ICD-10-CM

## 2023-12-21 RX ORDER — FLUOXETINE HYDROCHLORIDE 20 MG/1
20 CAPSULE ORAL DAILY
Qty: 90 CAPSULE | Refills: 0 | OUTPATIENT
Start: 2023-12-21

## 2024-02-02 DIAGNOSIS — F41.1 GENERALIZED ANXIETY DISORDER: ICD-10-CM

## 2024-02-02 RX ORDER — FLUOXETINE HYDROCHLORIDE 20 MG/1
20 CAPSULE ORAL DAILY
Qty: 90 CAPSULE | Refills: 0 | OUTPATIENT
Start: 2024-02-02

## 2024-02-13 ENCOUNTER — OFFICE VISIT (OUTPATIENT)
Dept: INTERNAL MEDICINE | Facility: CLINIC | Age: 29
End: 2024-02-13
Payer: COMMERCIAL

## 2024-02-13 VITALS
TEMPERATURE: 97.8 F | BODY MASS INDEX: 25.77 KG/M2 | HEART RATE: 71 BPM | WEIGHT: 180 LBS | DIASTOLIC BLOOD PRESSURE: 82 MMHG | SYSTOLIC BLOOD PRESSURE: 140 MMHG | OXYGEN SATURATION: 98 % | HEIGHT: 70 IN

## 2024-02-13 DIAGNOSIS — H93.8X3 SENSATION OF FULLNESS IN BOTH EARS: Primary | ICD-10-CM

## 2024-02-13 DIAGNOSIS — H92.02 DISCOMFORT OF AURICLE OF LEFT EAR: ICD-10-CM

## 2024-02-13 DIAGNOSIS — Z86.69 HISTORY OF IMPACTED CERUMEN: ICD-10-CM

## 2024-02-13 NOTE — PROGRESS NOTES
Office Note     Name: Blade Hall    : 1995     MRN: [id number]     Chief Complaint  Cerumen Impaction    Subjective     History of Present Illness:  Blade Hall is a 28 y.o. male who presents today for evaluation of ear complaints.  Patient has a history of cerumen impaction requiring irrigation in the past.  Patient reports he normally flushes his ears out at home and is able to get wax out that way.  He has been feeling some left ear discomfort and feels as though there may be something in it.  He reports this started about 3 days ago.  He is using Flonase regularly to assist with symptoms.  He reports frequent issues with his ears when the weather changes.  He denies any hearing issues or deficits at this time.  He follows with Dr. Bull for chronic conditions.  No further complaints or concerns at this time.  Pleasant visit with the patient today.      Past Medical History:   Diagnosis Date    Asthma     GERD (gastroesophageal reflux disease)     Headache     Hypogonadotropic hypogonadism 03/15/2017    Low testosterone in male     Palpitations 2020    Panic attack as reaction to stress 2016    Last Impression: 12 May 2015  Adv. to start atenolol tonight, and then after 2 days use     prn.Take vistaril to sleep, so that he gets 8-9 hrs rest.cont prozac at current dose, as he     has dizziness with med change.  Bre Bull (Internal Medicine)       Past Surgical History:   Procedure Laterality Date    APPENDECTOMY         Social History     Socioeconomic History    Marital status:    Tobacco Use    Smoking status: Former     Packs/day: 0.00     Years: 0.00     Additional pack years: 0.00     Total pack years: 0.00     Types: Cigarettes     Start date: 2015     Quit date: 2016     Years since quittin.1    Smokeless tobacco: Never    Tobacco comments:     Budget   Vaping Use    Vaping Use: Never used   Substance and Sexual Activity    Alcohol use: No    Drug use:  "No    Sexual activity: Yes     Partners: Female         Current Outpatient Medications:     B-D 3CC LUER-RENETTA SYR 37HP6-3/2 21G X 1-1/2\" 3 ML misc, USE 2 PER MONTH TO ADMINISTER TESTOSTERONE, Disp: 13 each, Rfl: 3    FLUoxetine (PROzac) 40 MG capsule, Take 1 capsule by mouth Daily., Disp: 90 capsule, Rfl: 1    fluticasone (FLONASE) 50 MCG/ACT nasal spray, 2 sprays by Each Nare route Daily., Disp: 16 g, Rfl: 5    hydrOXYzine (ATARAX) 10 MG tablet, Take 1 tablet by mouth 3 (Three) Times a Day As Needed for Anxiety., Disp: 30 tablet, Rfl: 2    omeprazole (priLOSEC) 40 MG capsule, Take 1 capsule by mouth Daily., Disp: 90 capsule, Rfl: 1    Testosterone Undecanoate (Jatenzo) 237 MG capsule, Take 237 mg by mouth 2 (Two) Times a Day., Disp: 60 capsule, Rfl: 5    Objective     Vital Signs  /82   Pulse 71   Temp 97.8 °F (36.6 °C)   Ht 177.8 cm (70\")   Wt 81.6 kg (180 lb)   SpO2 98%   BMI 25.83 kg/m²   Estimated body mass index is 25.83 kg/m² as calculated from the following:    Height as of this encounter: 177.8 cm (70\").    Weight as of this encounter: 81.6 kg (180 lb).           Physical Exam  Constitutional:       Appearance: Normal appearance.   HENT:      Head: Normocephalic and atraumatic.      Right Ear: Tympanic membrane, ear canal and external ear normal.      Left Ear: Tympanic membrane, ear canal and external ear normal.      Ears:      Comments: Clear effusion bilaterally     Nose: Nose normal.   Eyes:      Extraocular Movements: Extraocular movements intact.      Conjunctiva/sclera: Conjunctivae normal.      Pupils: Pupils are equal, round, and reactive to light.   Cardiovascular:      Rate and Rhythm: Normal rate.   Pulmonary:      Effort: Pulmonary effort is normal. No respiratory distress.   Musculoskeletal:         General: Normal range of motion.      Cervical back: Normal range of motion and neck supple.   Skin:     General: Skin is warm and dry.   Neurological:      General: No focal deficit " present.      Mental Status: He is alert and oriented to person, place, and time. Mental status is at baseline.   Psychiatric:         Mood and Affect: Mood normal.         Behavior: Behavior normal.         Thought Content: Thought content normal.         Judgment: Judgment normal.          Assessment and Plan     Diagnoses and all orders for this visit:    1. Sensation of fullness in both ears (Primary)    2. Discomfort of auricle of left ear    3. History of impacted cerumen        Follow Up  Return if symptoms worsen or fail to improve, for Follow up with Dr. Bull.    BECCA Ferris    Part of this note may be an electronic transcription/translation of spoken language to printed text using the Dragon Dictation System.

## 2024-03-11 DIAGNOSIS — E23.0 HYPOGONADOTROPIC HYPOGONADISM: ICD-10-CM

## 2024-03-11 RX ORDER — TESTOSTERONE ENANTHATE 200 MG/ML
INJECTION, SOLUTION INTRAMUSCULAR
Qty: 10 ML | Refills: 1 | Status: SHIPPED | OUTPATIENT
Start: 2024-03-11

## 2024-05-07 ENCOUNTER — OFFICE VISIT (OUTPATIENT)
Dept: ENDOCRINOLOGY | Facility: CLINIC | Age: 29
End: 2024-05-07
Payer: COMMERCIAL

## 2024-05-07 VITALS
HEIGHT: 70 IN | SYSTOLIC BLOOD PRESSURE: 124 MMHG | BODY MASS INDEX: 25.22 KG/M2 | WEIGHT: 176.2 LBS | DIASTOLIC BLOOD PRESSURE: 70 MMHG

## 2024-05-07 DIAGNOSIS — E23.0 HYPOGONADOTROPIC HYPOGONADISM: Primary | ICD-10-CM

## 2024-05-07 LAB
ALBUMIN SERPL-MCNC: 4.6 G/DL (ref 3.5–5.2)
ALBUMIN/GLOB SERPL: 1.5 G/DL
ALP SERPL-CCNC: 55 U/L (ref 39–117)
ALT SERPL W P-5'-P-CCNC: 22 U/L (ref 1–41)
ANION GAP SERPL CALCULATED.3IONS-SCNC: 12.7 MMOL/L (ref 5–15)
AST SERPL-CCNC: 27 U/L (ref 1–40)
BASOPHILS # BLD AUTO: 0.03 10*3/MM3 (ref 0–0.2)
BASOPHILS NFR BLD AUTO: 0.4 % (ref 0–1.5)
BILIRUB SERPL-MCNC: 0.9 MG/DL (ref 0–1.2)
BUN SERPL-MCNC: 17 MG/DL (ref 6–20)
BUN/CREAT SERPL: 19.1 (ref 7–25)
CALCIUM SPEC-SCNC: 9.4 MG/DL (ref 8.6–10.5)
CHLORIDE SERPL-SCNC: 100 MMOL/L (ref 98–107)
CO2 SERPL-SCNC: 24.3 MMOL/L (ref 22–29)
CREAT SERPL-MCNC: 0.89 MG/DL (ref 0.76–1.27)
DEPRECATED RDW RBC AUTO: 40.5 FL (ref 37–54)
EGFRCR SERPLBLD CKD-EPI 2021: 118.2 ML/MIN/1.73
EOSINOPHIL # BLD AUTO: 0.15 10*3/MM3 (ref 0–0.4)
EOSINOPHIL NFR BLD AUTO: 1.9 % (ref 0.3–6.2)
ERYTHROCYTE [DISTWIDTH] IN BLOOD BY AUTOMATED COUNT: 12.5 % (ref 12.3–15.4)
GLOBULIN UR ELPH-MCNC: 3.1 GM/DL
GLUCOSE SERPL-MCNC: 86 MG/DL (ref 65–99)
HCT VFR BLD AUTO: 51.4 % (ref 37.5–51)
HGB BLD-MCNC: 17.6 G/DL (ref 13–17.7)
IMM GRANULOCYTES # BLD AUTO: 0.03 10*3/MM3 (ref 0–0.05)
IMM GRANULOCYTES NFR BLD AUTO: 0.4 % (ref 0–0.5)
LYMPHOCYTES # BLD AUTO: 2.15 10*3/MM3 (ref 0.7–3.1)
LYMPHOCYTES NFR BLD AUTO: 26.5 % (ref 19.6–45.3)
MCH RBC QN AUTO: 30 PG (ref 26.6–33)
MCHC RBC AUTO-ENTMCNC: 34.2 G/DL (ref 31.5–35.7)
MCV RBC AUTO: 87.6 FL (ref 79–97)
MONOCYTES # BLD AUTO: 0.72 10*3/MM3 (ref 0.1–0.9)
MONOCYTES NFR BLD AUTO: 8.9 % (ref 5–12)
NEUTROPHILS NFR BLD AUTO: 5.02 10*3/MM3 (ref 1.7–7)
NEUTROPHILS NFR BLD AUTO: 61.9 % (ref 42.7–76)
NRBC BLD AUTO-RTO: 0 /100 WBC (ref 0–0.2)
PLATELET # BLD AUTO: 184 10*3/MM3 (ref 140–450)
PMV BLD AUTO: 11.9 FL (ref 6–12)
POTASSIUM SERPL-SCNC: 4.1 MMOL/L (ref 3.5–5.2)
PROT SERPL-MCNC: 7.7 G/DL (ref 6–8.5)
RBC # BLD AUTO: 5.87 10*6/MM3 (ref 4.14–5.8)
SODIUM SERPL-SCNC: 137 MMOL/L (ref 136–145)
TESTOST SERPL-MCNC: 286 NG/DL (ref 249–836)
WBC NRBC COR # BLD AUTO: 8.1 10*3/MM3 (ref 3.4–10.8)

## 2024-05-07 PROCEDURE — 99213 OFFICE O/P EST LOW 20 MIN: CPT | Performed by: INTERNAL MEDICINE

## 2024-05-07 PROCEDURE — 80053 COMPREHEN METABOLIC PANEL: CPT | Performed by: INTERNAL MEDICINE

## 2024-05-07 PROCEDURE — 84403 ASSAY OF TOTAL TESTOSTERONE: CPT | Performed by: INTERNAL MEDICINE

## 2024-05-07 PROCEDURE — 85025 COMPLETE CBC W/AUTO DIFF WBC: CPT | Performed by: INTERNAL MEDICINE

## 2024-05-07 RX ORDER — TESTOSTERONE UNDECANOATE 237 MG/1
1 CAPSULE, LIQUID FILLED ORAL EVERY 12 HOURS SCHEDULED
COMMUNITY
Start: 2024-04-12 | End: 2024-05-08 | Stop reason: SDUPTHER

## 2024-05-08 DIAGNOSIS — E23.0 HYPOGONADOTROPIC HYPOGONADISM: Primary | ICD-10-CM

## 2024-05-08 RX ORDER — TESTOSTERONE UNDECANOATE 237 MG/1
1 CAPSULE, LIQUID FILLED ORAL EVERY 12 HOURS SCHEDULED
Qty: 60 CAPSULE | Refills: 5 | Status: SHIPPED | OUTPATIENT
Start: 2024-05-08

## 2024-05-21 DIAGNOSIS — E23.0 HYPOGONADOTROPIC HYPOGONADISM: ICD-10-CM

## 2024-05-21 RX ORDER — TESTOSTERONE UNDECANOATE 237 MG/1
CAPSULE, LIQUID FILLED ORAL
Qty: 60 CAPSULE | OUTPATIENT
Start: 2024-05-21

## 2024-05-28 ENCOUNTER — TELEPHONE (OUTPATIENT)
Dept: INTERNAL MEDICINE | Facility: CLINIC | Age: 29
End: 2024-05-28

## 2024-05-28 DIAGNOSIS — F41.1 GENERALIZED ANXIETY DISORDER: ICD-10-CM

## 2024-05-28 RX ORDER — FLUOXETINE HYDROCHLORIDE 40 MG/1
40 CAPSULE ORAL DAILY
Qty: 90 CAPSULE | Refills: 0 | Status: SHIPPED | OUTPATIENT
Start: 2024-05-28

## 2024-05-28 NOTE — TELEPHONE ENCOUNTER
Caller: Rafael Blade CASSIDY    Relationship: Self    Best call back number: [phone]     What is the best time to reach you: ANY    Who are you requesting to speak with (clinical staff, provider,  specific staff member): DR. BAEZ OR HER NURSE    What was the call regarding: THE PATIENT HAD TO RESCHEDULE HIS APPOINTMENT TODAY BECAUSE HIS WORK WILL NOT LET HIM OFF. THE PATIENT SCHEDULED FOR THE SOONEST AFTERNOON AVAILABLE WITH DR. BAEZ. THIS IS NOT UNTIL LATE SEPTEMBER AND HE WOULD LIKE TO MAKE SURE HIS FLUoxetine (PROzac) 40 MG capsule WILL STILL BE FILLED UNTIL THEN. THE PATIENT SAID THAT IF SOMETHING COMES UP SOONER PLEASE CALL HIM.     Is it okay if the provider responds through [facility]Mark[facility]: NO

## 2024-05-31 ENCOUNTER — TELEPHONE (OUTPATIENT)
Dept: ENDOCRINOLOGY | Facility: CLINIC | Age: 29
End: 2024-05-31
Payer: COMMERCIAL

## 2024-06-03 NOTE — TELEPHONE ENCOUNTER
Pt was advised he should call Dr Bull to prescribe the clomid because she gave it to him the last time  Pt voiced understanding

## 2024-06-04 ENCOUNTER — TELEPHONE (OUTPATIENT)
Dept: INTERNAL MEDICINE | Facility: CLINIC | Age: 29
End: 2024-06-04

## 2024-06-04 DIAGNOSIS — N46.9 MALE FERTILITY PROBLEM: Primary | ICD-10-CM

## 2024-06-04 RX ORDER — TRIAMCINOLONE ACETONIDE 1 MG/G
CREAM TOPICAL
Qty: 15 TABLET | Refills: 1 | Status: SHIPPED | OUTPATIENT
Start: 2024-06-04

## 2024-06-04 NOTE — TELEPHONE ENCOUNTER
Caller: Blade Hall    Relationship: Self    Best call back number: [phone]     What medication are you requesting: CLOMIPHENE OR CLOMID     What are your current symptoms: FOR WHEN HE GETS OFF OF TESTOSTERONE.     Have you had these symptoms before:    [x] Yes  [] No    Have you been treated for these symptoms before:   [x] Yes  [] No    If a prescription is needed, what is your preferred pharmacy and phone number:  [id number] [id number]  AT [facility] - [phone] [facility] [phone] FX      Additional notes: THE PATIENT WAS TOLD BY GENET STEARNS TO HAVE DR. BAEZ PRESCRIBED THIS MEDICATION SINCE SHE PRESCRIBED THIS LAST TIME.

## 2024-06-04 NOTE — TELEPHONE ENCOUNTER
Please find out if he is off the testosterone replacement and if yes,he can try a short course of Clomid.  But it would be better for him to get into the fertility clinic so they can do hCG injections and other treatments that are more effective.

## 2024-08-22 DIAGNOSIS — E23.0 HYPOGONADOTROPIC HYPOGONADISM: ICD-10-CM

## 2024-08-23 RX ORDER — TESTOSTERONE UNDECANOATE 237 MG/1
1 CAPSULE, LIQUID FILLED ORAL 2 TIMES DAILY
Qty: 60 CAPSULE | Refills: 1 | Status: SHIPPED | OUTPATIENT
Start: 2024-08-23

## 2024-08-23 NOTE — TELEPHONE ENCOUNTER
Rx Refill Note  Requested Prescriptions     Pending Prescriptions Disp Refills    Jatenzo 237 MG capsule [Pharmacy Med Name: JATENZO 237MG CAPSULES] 60 capsule      Sig: TAKE 1 CAPSULE BY MOUTH TWICE DAILY      Last office visit with prescribing clinician: 5/7/2024   Next office visit with prescribing clinician: 8/23/2024                           Charley Solis MA  08/23/24, 09:58 EDT

## 2024-09-24 ENCOUNTER — OFFICE VISIT (OUTPATIENT)
Dept: INTERNAL MEDICINE | Facility: CLINIC | Age: 29
End: 2024-09-24
Payer: COMMERCIAL

## 2024-09-24 VITALS
DIASTOLIC BLOOD PRESSURE: 72 MMHG | BODY MASS INDEX: 26.23 KG/M2 | HEIGHT: 70 IN | HEART RATE: 81 BPM | SYSTOLIC BLOOD PRESSURE: 116 MMHG | OXYGEN SATURATION: 97 % | TEMPERATURE: 97.2 F | WEIGHT: 183.2 LBS

## 2024-09-24 DIAGNOSIS — R51.9 INTRACTABLE HEADACHE, UNSPECIFIED CHRONICITY PATTERN, UNSPECIFIED HEADACHE TYPE: ICD-10-CM

## 2024-09-24 DIAGNOSIS — K21.9 GASTROESOPHAGEAL REFLUX DISEASE WITHOUT ESOPHAGITIS: ICD-10-CM

## 2024-09-24 DIAGNOSIS — R37 SEXUAL DYSFUNCTION: ICD-10-CM

## 2024-09-24 DIAGNOSIS — F41.1 GENERALIZED ANXIETY DISORDER: Primary | ICD-10-CM

## 2024-09-24 PROCEDURE — 99214 OFFICE O/P EST MOD 30 MIN: CPT | Performed by: INTERNAL MEDICINE

## 2024-09-24 RX ORDER — OMEPRAZOLE 40 MG/1
40 CAPSULE, DELAYED RELEASE ORAL DAILY
Qty: 90 CAPSULE | Refills: 1 | Status: SHIPPED | OUTPATIENT
Start: 2024-09-24

## 2024-09-24 RX ORDER — FLUOXETINE 40 MG/1
40 CAPSULE ORAL DAILY
Qty: 90 CAPSULE | Refills: 3 | Status: SHIPPED | OUTPATIENT
Start: 2024-09-24 | End: 2024-09-24

## 2024-09-24 RX ORDER — FLUTICASONE PROPIONATE 50 UG/1
2 SPRAY, METERED NASAL DAILY
Qty: 16 G | Refills: 5 | Status: SHIPPED | OUTPATIENT
Start: 2024-09-24

## 2024-09-24 RX ORDER — VILAZODONE HYDROCHLORIDE 20 MG/1
20 TABLET ORAL DAILY
Qty: 30 TABLET | Refills: 3 | Status: SHIPPED | OUTPATIENT
Start: 2024-09-24

## 2024-10-15 ENCOUNTER — TELEPHONE (OUTPATIENT)
Dept: INTERNAL MEDICINE | Facility: CLINIC | Age: 29
End: 2024-10-15

## 2024-10-15 ENCOUNTER — TELEPHONE (OUTPATIENT)
Dept: BEHAVIORAL HEALTH | Facility: CLINIC | Age: 29
End: 2024-10-15
Payer: COMMERCIAL

## 2024-10-15 ENCOUNTER — OFFICE VISIT (OUTPATIENT)
Dept: INTERNAL MEDICINE | Facility: CLINIC | Age: 29
End: 2024-10-15
Payer: COMMERCIAL

## 2024-10-15 VITALS
HEIGHT: 70 IN | BODY MASS INDEX: 26.71 KG/M2 | DIASTOLIC BLOOD PRESSURE: 82 MMHG | HEART RATE: 76 BPM | OXYGEN SATURATION: 97 % | SYSTOLIC BLOOD PRESSURE: 154 MMHG | TEMPERATURE: 97.4 F | WEIGHT: 186.6 LBS

## 2024-10-15 DIAGNOSIS — F41.1 GENERALIZED ANXIETY DISORDER: ICD-10-CM

## 2024-10-15 DIAGNOSIS — R37 SEXUAL DYSFUNCTION: Primary | ICD-10-CM

## 2024-10-15 DIAGNOSIS — K13.0 ANGULAR CHEILOSIS: ICD-10-CM

## 2024-10-15 PROCEDURE — 99214 OFFICE O/P EST MOD 30 MIN: CPT | Performed by: INTERNAL MEDICINE

## 2024-10-15 RX ORDER — DESVENLAFAXINE 50 MG/1
50 TABLET, FILM COATED, EXTENDED RELEASE ORAL DAILY
Qty: 30 TABLET | Refills: 5 | Status: SHIPPED | OUTPATIENT
Start: 2024-10-15

## 2024-10-15 RX ORDER — NYSTATIN AND TRIAMCINOLONE ACETONIDE 100000; 1 [USP'U]/G; MG/G
1 OINTMENT TOPICAL 2 TIMES DAILY
Qty: 30 G | Refills: 2 | Status: SHIPPED | OUTPATIENT
Start: 2024-10-15

## 2024-10-15 RX ORDER — SILDENAFIL 100 MG/1
50-100 TABLET, FILM COATED ORAL DAILY PRN
Qty: 30 TABLET | Refills: 0 | Status: SHIPPED | OUTPATIENT
Start: 2024-10-15

## 2024-10-15 NOTE — TELEPHONE ENCOUNTER
Caller: Rafael Blade KHANH    Relationship: Self    Best call back number: [phone]     What form or medical record are you requesting: WORK EXCUSE FOR TODAYS APPOINTMENT    Who is requesting this form or medical record from you: PATIENT     How would you like to receive the form or medical records (pick-up, mail, fax): UP LOAD TO MY CHART  If fax, what is the fax number:   If mail, what is the address:   If pick-up, provide patient with address and location details    Timeframe paperwork needed: ASAP    Additional notes: PATIENT IS NEEDING A WORK EXCUSE FOR TODAYS OFFICE VISIT. PLEASE UPLOAD TO HIS MYCHART

## 2024-10-15 NOTE — PROGRESS NOTES
"Depression (Having a lot of side effects on new medication)    Subjective   Blade Hall is a 29 y.o. male is here today for follow-up.    DepressionPatient is not experiencing: confusion, palpitations, shortness of breath, chest pain, dizziness and nausea.  His past medical history is significant for depression.     Having a lot of diarrhea on the viibryd also would like to get some viagra.  Lip- cold sore, touched his groin after touching the cold sore and now has a blister. down there.        Current Outpatient Medications:     B-D 3CC LUER-RENETTA SYR 21PR9-7/2 21G X 1-1/2\" 3 ML misc, USE 2 PER MONTH TO ADMINISTER TESTOSTERONE, Disp: 13 each, Rfl: 3    fluticasone (FLONASE) 50 MCG/ACT nasal spray, 2 sprays by Each Nare route Daily., Disp: 16 g, Rfl: 5    omeprazole (priLOSEC) 40 MG capsule, Take 1 capsule by mouth Daily., Disp: 90 capsule, Rfl: 1    Testosterone Undecanoate (Jatenzo) 237 MG capsule, TAKE 1 CAPSULE BY MOUTH TWICE DAILY, Disp: 60 capsule, Rfl: 1    desvenlafaxine (Pristiq) 50 MG 24 hr tablet, Take 1 tablet by mouth Daily. Replaces viibryd, Disp: 30 tablet, Rfl: 5    nystatin-triamcinolone (MYCOLOG) 737140-2.1 UNIT/GM-% ointment, Apply 1 Application topically to the appropriate area as directed 2 (Two) Times a Day., Disp: 30 g, Rfl: 2    sildenafil (Viagra) 100 MG tablet, Take 0.5-1 tablets by mouth Daily As Needed for Erectile Dysfunction., Disp: 30 tablet, Rfl: 0      The following portions of the patient's history were reviewed and updated as appropriate: allergies, current medications, past family history, past medical history, past social history, past surgical history and problem list.    Review of Systems   Constitutional: Negative.  Negative for chills and fever.   HENT:  Negative for ear discharge, ear pain, sinus pressure and sore throat.    Respiratory:  Negative for cough, chest tightness and shortness of breath.    Cardiovascular:  Negative for chest pain, palpitations and leg swelling. " "  Gastrointestinal:  Negative for diarrhea, nausea and vomiting.   Musculoskeletal:  Negative for arthralgias, back pain and myalgias.   Neurological:  Negative for dizziness, syncope and headaches.   Psychiatric/Behavioral:  Negative for confusion and sleep disturbance.        Objective   /82   Pulse 76   Temp 97.4 °F (36.3 °C)   Ht 177.8 cm (70\")   Wt 84.6 kg (186 lb 9.6 oz)   SpO2 97% Comment: ra  BMI 26.77 kg/m²   Physical Exam  Vitals and nursing note reviewed.   Constitutional:       Appearance: He is well-developed.   HENT:      Head: Normocephalic and atraumatic.      Right Ear: External ear normal.      Left Ear: External ear normal.      Mouth/Throat:      Pharynx: No oropharyngeal exudate.   Eyes:      Conjunctiva/sclera: Conjunctivae normal.      Pupils: Pupils are equal, round, and reactive to light.   Neck:      Thyroid: No thyromegaly.   Cardiovascular:      Rate and Rhythm: Normal rate and regular rhythm.      Pulses: Normal pulses.      Heart sounds: Normal heart sounds. No murmur heard.     No friction rub. No gallop.   Pulmonary:      Effort: Pulmonary effort is normal.      Breath sounds: Normal breath sounds.   Abdominal:      General: Bowel sounds are normal. There is no distension.      Palpations: Abdomen is soft.      Tenderness: There is no abdominal tenderness.   Musculoskeletal:      Cervical back: Neck supple.   Skin:     General: Skin is warm and dry.      Findings: Rash (2 tiny papular eruption over base of penis, no erythema  or scaling) present.      Comments: Angular cheilosis   Neurological:      Mental Status: He is alert and oriented to person, place, and time.      Cranial Nerves: No cranial nerve deficit.   Psychiatric:         Judgment: Judgment normal.                 Results for orders placed or performed in visit on 05/07/24   Comprehensive Metabolic Panel    Collection Time: 05/07/24 12:27 PM    Specimen: Arm, Left; Blood   Result Value Ref Range    Glucose 86 " 65 - 99 mg/dL    BUN 17 6 - 20 mg/dL    Creatinine 0.89 0.76 - 1.27 mg/dL    Sodium 137 136 - 145 mmol/L    Potassium 4.1 3.5 - 5.2 mmol/L    Chloride 100 98 - 107 mmol/L    CO2 24.3 22.0 - 29.0 mmol/L    Calcium 9.4 8.6 - 10.5 mg/dL    Total Protein 7.7 6.0 - 8.5 g/dL    Albumin 4.6 3.5 - 5.2 g/dL    ALT (SGPT) 22 1 - 41 U/L    AST (SGOT) 27 1 - 40 U/L    Alkaline Phosphatase 55 39 - 117 U/L    Total Bilirubin 0.9 0.0 - 1.2 mg/dL    Globulin 3.1 gm/dL    A/G Ratio 1.5 g/dL    BUN/Creatinine Ratio 19.1 7.0 - 25.0    Anion Gap 12.7 5.0 - 15.0 mmol/L    eGFR 118.2 >60.0 mL/min/1.73   CBC Auto Differential    Collection Time: 05/07/24 12:27 PM    Specimen: Blood   Result Value Ref Range    WBC 8.10 3.40 - 10.80 10*3/mm3    RBC 5.87 (H) 4.14 - 5.80 10*6/mm3    Hemoglobin 17.6 13.0 - 17.7 g/dL    Hematocrit 51.4 (H) 37.5 - 51.0 %    MCV 87.6 79.0 - 97.0 fL    MCH 30.0 26.6 - 33.0 pg    MCHC 34.2 31.5 - 35.7 g/dL    RDW 12.5 12.3 - 15.4 %    RDW-SD 40.5 37.0 - 54.0 fl    MPV 11.9 6.0 - 12.0 fL    Platelets 184 140 - 450 10*3/mm3    Neutrophil % 61.9 42.7 - 76.0 %    Lymphocyte % 26.5 19.6 - 45.3 %    Monocyte % 8.9 5.0 - 12.0 %    Eosinophil % 1.9 0.3 - 6.2 %    Basophil % 0.4 0.0 - 1.5 %    Immature Grans % 0.4 0.0 - 0.5 %    Neutrophils, Absolute 5.02 1.70 - 7.00 10*3/mm3    Lymphocytes, Absolute 2.15 0.70 - 3.10 10*3/mm3    Monocytes, Absolute 0.72 0.10 - 0.90 10*3/mm3    Eosinophils, Absolute 0.15 0.00 - 0.40 10*3/mm3    Basophils, Absolute 0.03 0.00 - 0.20 10*3/mm3    Immature Grans, Absolute 0.03 0.00 - 0.05 10*3/mm3    nRBC 0.0 0.0 - 0.2 /100 WBC   Testosterone    Collection Time: 05/07/24 12:27 PM    Specimen: Arm, Left; Blood   Result Value Ref Range    Testosterone, Total 286.00 249.00 - 836.00 ng/dL             Assessment & Plan   Diagnoses and all orders for this visit:    Sexual dysfunction  -     sildenafil (Viagra) 100 MG tablet; Take 0.5-1 tablets by mouth Daily As Needed for Erectile  Dysfunction.    Generalized anxiety disorder  Comments:  start viibryd  Orders:  -     desvenlafaxine (Pristiq) 50 MG 24 hr tablet; Take 1 tablet by mouth Daily. Replaces viibryd    Angular cheilosis  -     nystatin-triamcinolone (MYCOLOG) 800072-7.1 UNIT/GM-% ointment; Apply 1 Application topically to the appropriate area as directed 2 (Two) Times a Day.    Counseled his sxs are not cold sores rather cheilosis. Meds as above, can use it for the rash on his penis as well             Return for Next scheduled follow up.    Electronically signed by:    Bre Bull MD

## 2024-11-12 ENCOUNTER — OFFICE VISIT (OUTPATIENT)
Dept: ENDOCRINOLOGY | Facility: CLINIC | Age: 29
End: 2024-11-12
Payer: COMMERCIAL

## 2024-11-12 VITALS
DIASTOLIC BLOOD PRESSURE: 74 MMHG | WEIGHT: 188 LBS | BODY MASS INDEX: 26.92 KG/M2 | HEIGHT: 70 IN | HEART RATE: 76 BPM | SYSTOLIC BLOOD PRESSURE: 132 MMHG

## 2024-11-12 DIAGNOSIS — Z12.5 SCREENING FOR PROSTATE CANCER: ICD-10-CM

## 2024-11-12 DIAGNOSIS — E23.0 HYPOGONADOTROPIC HYPOGONADISM: Primary | ICD-10-CM

## 2024-11-12 LAB
BASOPHILS # BLD AUTO: 0.09 10*3/MM3 (ref 0–0.2)
BASOPHILS NFR BLD AUTO: 0.9 % (ref 0–1.5)
DEPRECATED RDW RBC AUTO: 39.9 FL (ref 37–54)
EOSINOPHIL # BLD AUTO: 0.38 10*3/MM3 (ref 0–0.4)
EOSINOPHIL NFR BLD AUTO: 3.9 % (ref 0.3–6.2)
ERYTHROCYTE [DISTWIDTH] IN BLOOD BY AUTOMATED COUNT: 12.4 % (ref 12.3–15.4)
HCT VFR BLD AUTO: 48.2 % (ref 37.5–51)
HGB BLD-MCNC: 15.9 G/DL (ref 13–17.7)
IMM GRANULOCYTES # BLD AUTO: 0.03 10*3/MM3 (ref 0–0.05)
IMM GRANULOCYTES NFR BLD AUTO: 0.3 % (ref 0–0.5)
LYMPHOCYTES # BLD AUTO: 3.67 10*3/MM3 (ref 0.7–3.1)
LYMPHOCYTES NFR BLD AUTO: 38 % (ref 19.6–45.3)
MCH RBC QN AUTO: 29 PG (ref 26.6–33)
MCHC RBC AUTO-ENTMCNC: 33 G/DL (ref 31.5–35.7)
MCV RBC AUTO: 87.8 FL (ref 79–97)
MONOCYTES # BLD AUTO: 0.75 10*3/MM3 (ref 0.1–0.9)
MONOCYTES NFR BLD AUTO: 7.8 % (ref 5–12)
NEUTROPHILS NFR BLD AUTO: 4.73 10*3/MM3 (ref 1.7–7)
NEUTROPHILS NFR BLD AUTO: 49.1 % (ref 42.7–76)
NRBC BLD AUTO-RTO: 0 /100 WBC (ref 0–0.2)
PLATELET # BLD AUTO: 215 10*3/MM3 (ref 140–450)
PMV BLD AUTO: 11.9 FL (ref 6–12)
RBC # BLD AUTO: 5.49 10*6/MM3 (ref 4.14–5.8)
WBC NRBC COR # BLD AUTO: 9.65 10*3/MM3 (ref 3.4–10.8)

## 2024-11-12 PROCEDURE — 84403 ASSAY OF TOTAL TESTOSTERONE: CPT | Performed by: INTERNAL MEDICINE

## 2024-11-12 PROCEDURE — G0103 PSA SCREENING: HCPCS | Performed by: INTERNAL MEDICINE

## 2024-11-12 PROCEDURE — 99214 OFFICE O/P EST MOD 30 MIN: CPT | Performed by: INTERNAL MEDICINE

## 2024-11-12 PROCEDURE — 80053 COMPREHEN METABOLIC PANEL: CPT | Performed by: INTERNAL MEDICINE

## 2024-11-12 PROCEDURE — 85025 COMPLETE CBC W/AUTO DIFF WBC: CPT | Performed by: INTERNAL MEDICINE
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
## 2024-11-13 LAB
ALBUMIN SERPL-MCNC: 4.6 G/DL (ref 3.5–5.2)
ALBUMIN/GLOB SERPL: 1.6 G/DL
ALP SERPL-CCNC: 68 U/L (ref 39–117)
ALT SERPL W P-5'-P-CCNC: 44 U/L (ref 1–41)
ANION GAP SERPL CALCULATED.3IONS-SCNC: 12.1 MMOL/L (ref 5–15)
AST SERPL-CCNC: 37 U/L (ref 1–40)
BILIRUB SERPL-MCNC: 0.6 MG/DL (ref 0–1.2)
BUN SERPL-MCNC: 13 MG/DL (ref 6–20)
BUN/CREAT SERPL: 17.6 (ref 7–25)
CALCIUM SPEC-SCNC: 9.2 MG/DL (ref 8.6–10.5)
CHLORIDE SERPL-SCNC: 104 MMOL/L (ref 98–107)
CO2 SERPL-SCNC: 24.9 MMOL/L (ref 22–29)
CREAT SERPL-MCNC: 0.74 MG/DL (ref 0.76–1.27)
EGFRCR SERPLBLD CKD-EPI 2021: 124.2 ML/MIN/1.73
GLOBULIN UR ELPH-MCNC: 2.9 GM/DL
GLUCOSE SERPL-MCNC: 75 MG/DL (ref 65–99)
POTASSIUM SERPL-SCNC: 3.8 MMOL/L (ref 3.5–5.2)
PROT SERPL-MCNC: 7.5 G/DL (ref 6–8.5)
PSA SERPL-MCNC: 0.69 NG/ML (ref 0–4)
SODIUM SERPL-SCNC: 141 MMOL/L (ref 136–145)
TESTOST SERPL-MCNC: 505 NG/DL (ref 249–836)

## 2024-12-02 ENCOUNTER — OFFICE VISIT (OUTPATIENT)
Dept: INTERNAL MEDICINE | Facility: CLINIC | Age: 29
End: 2024-12-02
Payer: COMMERCIAL

## 2024-12-02 ENCOUNTER — LAB (OUTPATIENT)
Dept: LAB | Facility: HOSPITAL | Age: 29
End: 2024-12-02
Payer: COMMERCIAL

## 2024-12-02 VITALS
WEIGHT: 189.6 LBS | TEMPERATURE: 98.3 F | DIASTOLIC BLOOD PRESSURE: 78 MMHG | HEIGHT: 70 IN | SYSTOLIC BLOOD PRESSURE: 128 MMHG | BODY MASS INDEX: 27.14 KG/M2 | HEART RATE: 88 BPM | OXYGEN SATURATION: 97 %

## 2024-12-02 DIAGNOSIS — Z00.00 ROUTINE GENERAL MEDICAL EXAMINATION AT A HEALTH CARE FACILITY: Primary | ICD-10-CM

## 2024-12-02 DIAGNOSIS — Z00.00 ROUTINE GENERAL MEDICAL EXAMINATION AT A HEALTH CARE FACILITY: ICD-10-CM

## 2024-12-02 DIAGNOSIS — E55.9 VITAMIN D DEFICIENCY: ICD-10-CM

## 2024-12-02 DIAGNOSIS — R37 SEXUAL DYSFUNCTION: ICD-10-CM

## 2024-12-02 DIAGNOSIS — F41.1 GENERALIZED ANXIETY DISORDER: ICD-10-CM

## 2024-12-02 DIAGNOSIS — M51.370 DEGENERATION OF INTERVERTEBRAL DISC OF LUMBOSACRAL REGION WITH DISCOGENIC BACK PAIN: ICD-10-CM

## 2024-12-02 DIAGNOSIS — R31.9 HEMATURIA, UNSPECIFIED TYPE: ICD-10-CM

## 2024-12-02 LAB
BILIRUB BLD-MCNC: NEGATIVE MG/DL
CLARITY, POC: CLEAR
COLOR UR: YELLOW
EXPIRATION DATE: ABNORMAL
GLUCOSE UR STRIP-MCNC: NEGATIVE MG/DL
HBA1C MFR BLD: 5.5 % (ref 4.8–5.6)
KETONES UR QL: NEGATIVE
LEUKOCYTE EST, POC: NEGATIVE
Lab: ABNORMAL
NITRITE UR-MCNC: NEGATIVE MG/ML
PH UR: 6.5 [PH] (ref 5–8)
PROT UR STRIP-MCNC: NEGATIVE MG/DL
RBC # UR STRIP: ABNORMAL /UL
SP GR UR: 1.01 (ref 1–1.03)
UROBILINOGEN UR QL: NORMAL

## 2024-12-02 PROCEDURE — 82306 VITAMIN D 25 HYDROXY: CPT

## 2024-12-02 PROCEDURE — 83036 HEMOGLOBIN GLYCOSYLATED A1C: CPT

## 2024-12-02 PROCEDURE — 82607 VITAMIN B-12: CPT

## 2024-12-02 PROCEDURE — 84443 ASSAY THYROID STIM HORMONE: CPT

## 2024-12-02 PROCEDURE — 80061 LIPID PANEL: CPT

## 2024-12-02 PROCEDURE — 99395 PREV VISIT EST AGE 18-39: CPT | Performed by: INTERNAL MEDICINE

## 2024-12-02 PROCEDURE — 81003 URINALYSIS AUTO W/O SCOPE: CPT | Performed by: INTERNAL MEDICINE

## 2024-12-02 RX ORDER — DESVENLAFAXINE 50 MG/1
50 TABLET, FILM COATED, EXTENDED RELEASE ORAL DAILY
Qty: 90 TABLET | Refills: 2 | Status: SHIPPED | OUTPATIENT
Start: 2024-12-02

## 2024-12-02 RX ORDER — SILDENAFIL 100 MG/1
50-100 TABLET, FILM COATED ORAL DAILY PRN
Qty: 30 TABLET | Refills: 0 | Status: SHIPPED | OUTPATIENT
Start: 2024-12-02
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
## 2024-12-03 LAB
25(OH)D3 SERPL-MCNC: 32.6 NG/ML (ref 30–100)
CHOLEST SERPL-MCNC: 241 MG/DL (ref 0–200)
HDLC SERPL-MCNC: 30 MG/DL (ref 40–60)
LDLC SERPL CALC-MCNC: 178 MG/DL (ref 0–100)
LDLC/HDLC SERPL: 5.87 {RATIO}
TRIGL SERPL-MCNC: 174 MG/DL (ref 0–150)
TSH SERPL DL<=0.05 MIU/L-ACNC: 1.94 UIU/ML (ref 0.27–4.2)
VIT B12 BLD-MCNC: 732 PG/ML (ref 211–946)
VLDLC SERPL-MCNC: 33 MG/DL (ref 5–40)

## 2024-12-10 ENCOUNTER — LAB (OUTPATIENT)
Dept: LAB | Facility: HOSPITAL | Age: 29
End: 2024-12-10
Payer: COMMERCIAL

## 2024-12-10 DIAGNOSIS — R31.9 HEMATURIA, UNSPECIFIED TYPE: ICD-10-CM

## 2024-12-10 PROCEDURE — 81001 URINALYSIS AUTO W/SCOPE: CPT

## 2024-12-11 LAB
BACTERIA UR QL AUTO: NORMAL /HPF
BILIRUB UR QL STRIP: NEGATIVE
CLARITY UR: CLEAR
COLOR UR: YELLOW
GLUCOSE UR STRIP-MCNC: NEGATIVE MG/DL
HGB UR QL STRIP.AUTO: NEGATIVE
HYALINE CASTS UR QL AUTO: NORMAL /LPF
KETONES UR QL STRIP: NEGATIVE
LEUKOCYTE ESTERASE UR QL STRIP.AUTO: NEGATIVE
NITRITE UR QL STRIP: NEGATIVE
PH UR STRIP.AUTO: 6 [PH] (ref 5–8)
PROT UR QL STRIP: NEGATIVE
RBC # UR STRIP: NORMAL /HPF
REF LAB TEST METHOD: NORMAL
SP GR UR STRIP: 1.02 (ref 1–1.03)
SQUAMOUS #/AREA URNS HPF: NORMAL /HPF
UROBILINOGEN UR QL STRIP: NORMAL
WBC # UR STRIP: NORMAL /HPF

## 2024-12-16 DIAGNOSIS — E23.0 HYPOGONADOTROPIC HYPOGONADISM: ICD-10-CM

## 2024-12-16 RX ORDER — TESTOSTERONE UNDECANOATE 237 MG/1
1 CAPSULE, LIQUID FILLED ORAL EVERY 12 HOURS
Qty: 60 CAPSULE | Refills: 5 | Status: SHIPPED | OUTPATIENT
Start: 2024-12-16

## 2025-04-01 NOTE — TELEPHONE ENCOUNTER
Caller: [facility]    Relationship: Self    Best call back number: [phone]    Caller requesting test results: [facility]    What test was performed: KNEE MRI    When was the test performed: LAST YEAR    Where was the test performed:  [facility]    Additional notes: NEED LEFT KNEE MRI RESULTS SENT TO [facility].    ALSO IF DR THINKS HE NEEDS SURGERY.       Bariatric Surgery Consult  DATE:  4/1/2025   REFERRING PHYSICIAN:  Yomi Green MD  CHIEF COMPLAINT: Morbid Obesity        Subjective:     Flores Garcia is a 32 year old female who presents to bariatric surgery clinic for evaluation and treatment of clinically significant obesity. The patient has made multiple previous attempts to lose weight but despite these efforts has failed to achieve lasting success, and is interested in bariatric surgery to help achieve their weight-loss and health-related goals.     Patient states that she has been overweight for the last 10 years.  She has been trying to lose dmitriy for the last 3 years.      Informational Seminar Completed: 3/12/25    Patient's Preferred Bariatric Procedure: sleeve gastrectomy  Patient's Preferred Surgeon: no preference    Previous Weight Loss Attempts:   Dietary: HMR, Weight Management Program- Dr. Olea, Weight Watchers, keto, calorie counting,   Weight Loss Medications: Topamax- not effective, Wegovy- patient took for 6 months and then felt stopped working  Exercise: walking, swimming  Mobility Status: independent  Diet Difficulties: portion control, not consistent eating schedule d/t work, skipping meals, snacking-  chips, stress eating    Heaviest Lifetime Weight: 289 lbs- current weight.  The most she has been able to lose is  almost 40 lbs, by taking Wegovy.   Lowest Adult Weight: 178 lbs in 2008  Patient Reported Weight Loss goal and/or Goals of Surgery: wants to feel better, wants to be able to work out consistently, wants to be able to move around better, wants to get prediabetes undercontrol  Goal Weight: no number goal    Social History  Smoking History: former cigarettes- quit 2010  Alcohol Use: social- 1-2x month  Illicit Drug Use: CBD gummies- occasionally about 1x a month- last used 1 month ago  Work: surgical tech at [employer]    Obesity-related Medical Conditions  Previous Mental Health Hospitalizations: age 10 for  depression  GERD: yes, controlled taking omeprazole 1x day  MAY: no previous sleep studies  DM: Hx of prediabetes. Last A1C 5.1 on 3/11/25.  HTN- taking losartan  Hyperlipidemia  Abnormal liver enzymes  Vitamin D deficiency  Depression  Fatty liver    Prior Abdominal Surgeries   section x1  Laparoscopic appendectomy  Laparoscopic incisional hernia repair      Past Medical History:   Diagnosis Date    Family history of Hashimoto thyroiditis     History of chlamydia     Hx Fracture     Major depression     Obesity       Past Surgical History:   Procedure Laterality Date     section, low transverse      Fracture surgery      ankle     Hernia repair  2021    Robotic assisted laparoscopic incisional hernia repair    Laparoscopic appendectomy  2019    Dr. Lawson    Lipoma resection Left 2021    Excision of left hip lipoma      ALLERGIES:  No Known Allergies   Social History     Tobacco Use    Smoking status: Former     Current packs/day: 0.00     Average packs/day: 0.3 packs/day for 9.0 years (2.3 ttl pk-yrs)     Types: Cigarettes     Start date: 2006     Quit date: 2015     Years since quittin.3    Smokeless tobacco: Never   Substance Use Topics    Alcohol use: Yes     Alcohol/week: 1.0 standard drink of alcohol     Types: 1 Standard drinks or equivalent per week     Comment: Seldom      Family History   Problem Relation Age of Onset    Hypertension Mother     Thyroid Mother     Depression Mother     Hypertension Father     Hyperlipidemia Father     Depression Father     Depression Sister     Schizophrenia Sister     Bipolar disorder Sister       Prior to Admission medications    Medication Sig Start Date End Date Taking? Authorizing Provider   losartan (COZAAR) 50 MG tablet Take 1 tablet by mouth daily. 24  Yes Esthela Sanders NP   omeprazole (PriLOSEC) 40 MG capsule Take 1 capsule by mouth daily. 23  Yes Yomi Green MD   semaglutide-Weight Management  (Wegovy) 2.4 MG/0.75ML injection Inject 2.4 mg into the skin every 7 days. Indications: OBESITY Dx E66.9  Patient not taking: Reported on 12/12/2024 7/11/24   Yomi Green MD   ibuprofen (MOTRIN) 800 MG tablet Take 1 tablet by mouth every 8 hours as needed for Pain.  Patient not taking: Reported on 4/1/2025 3/11/24   Yomi Green MD       Review of Systems   General/Constitutional:  Negative for fever, chills.  HEENT:  No dysphagia or odynophagia.   Cardiovascular:  Negative for palpitations or chest pain.    Respiratory:  Negative for chronic or productive cough or hemoptysis.    Gastrointestinal:  No abdominal pain, nausea, vomiting, or changes in bowel habits.    Genitourinary: Negative for dysuria or kidney stones  Musculoskeletal: Negative for other arthralgias, myalgias, or back pain than described.   Neurological: Negative for seizures.   Psychiatric: Negative for untreated disorders.   Lymph/Heme: No history clotting or bleeding disorders.   Endocrine: Negative for thyroid or other endocrine disorders   Skin: Negative for acute skin lesions.       Objective:     Visit Vitals  BP (!) 142/96   Pulse 73   Ht 5' 9\" (1.753 m)   Wt 130.6 kg (287 lb 14.7 oz)   LMP 11/15/2024 (Approximate)   SpO2 99%   BMI 42.52 kg/m²    Body mass index is 42.52 kg/m². 130.6 kg (287 lb 14.7 oz)    Constitutional: She is oriented and developed, nourished, and not distressed.   HENT:Head: Normocephalic.    Pulmonary/Chest: Effort normal and no respiratory distress.   Abdominal: Normal appearance, no distension and no mass. No tenderness. Scars from previous abdominal surgery identified.  Musculoskeletal: Grossly normal range of motion. 0+ edema.   Neurological: She is alert and oriented. No focal abnormalities.                                        Skin: Skin is warm, dry and intact. No cyanosis. Nails show no clubbing.   Psychiatric: Affect normal.     Data Review:  Lab Results   Component Value Date    WBC 7.3 03/11/2025     WBC 7.3 12/30/2019    MCV 88.6 03/11/2025    MCV 85.5 12/30/2019     Lab Results   Component Value Date    SODIUM 137 03/11/2025    SODIUM 133 (L) 12/30/2019    POTASSIUM 3.6 03/11/2025    POTASSIUM 4.1 12/31/2019    GLUCOSE 132 (H) 03/11/2025    GLUCOSE 104 (H) 12/30/2019    CHLORIDE 106 03/11/2025    CHLORIDE 99 12/30/2019    CO2 20 (L) 03/11/2025    CO2 21 12/30/2019    BUN 16 03/11/2025    BUN 11 12/30/2019    CREATININE 0.89 03/11/2025    CREATININE 0.74 12/30/2019     Lab Results   Component Value Date    AST 90 (H) 03/11/2025    AST 18 12/30/2019    BILIRUBIN 0.7 03/11/2025    BILIRUBIN 0.9 12/30/2019     Lab Results   Component Value Date    HGBA1C 5.1 03/11/2025         Assessment:     Morbid Obesity     Plan:     The patient was informed of their options that included continued, nonoperative medical management, as well as, other available surgical options such as sleeve gastrectomy and RNYGB.  We discussed these operations, how they achieve weight loss and lifestyle changes required for success, including that surgery is only a tool and results are not guaranteed.     We also discussed the risks, complications or side-effects whenever someone has an operation or medical procedure.  We did talk about the major and most common complications that could happen after this operation including: Death, heart and lung problems (heart attack, stroke, abnormal heart rhythm), blood clots and pulmonary embolism, leaks at the suture lines in the bowel, bleeding requiring transfusion or reoperation, conversion to an open operation, wound complications such as infections and hernias, strictures in the pouch, nutritional problems including low iron, B12, calcium, and others, gall stones, body image issues related to loose skin, internal hernias, marginal ulcers (shouldn't smoke or take NSAIDs), watch consumption of alcohol, and other rare non-discussed complications    We discussed that our goal is to lose weight and  ameliorate medical problems and not to obtain a specific body mass index.     We discussed the need for dietary evaluation and psychological evaluation. These consults will be placed.    Patient is interested in a sleeve gastrectomy. Patient understands the need for pre-operative EGD and liver shrink diet. We also discussed the need for urine nicotine and drug screen. Once again, we discussed our bariatric program policy of no smoking and drug policy in detail. They consent to urine screen.  Patient also understands the need for preoperative physical therapy evaluation and pre hab. Patient scored 2 on sleep apnea questionnaire therefore Sleep Medicine referral not needed.        ALPHONSO Zimmerman      To Do List:   -Urine drug and Nicotine screen- patient will occasionally eat CBD gummies. Last used 1 month ago. Patient will wait at least 1 month before urine drug and nicotine screens.   -Attend support group                STOP-BANG Sleep Apnea Questionnaire        STOP      Do you SNORE (louder than talking or loud enough to be heard through closed doors)? No   Do you often feel TIRED, fatigued, or sleepy during the daytime? No   Has anyone OBSERVED you stop breathing during your sleep? No   Do you or have you been treated for high blood PRESSURE? Yes     BANG      BMI more than 35kg/m2 Yes   AGE over 50 years old? No   NECK circumference >16 inches (40 cm)? No   GENDER: Male? No     TOTAL SCORE   2       High risk for MAY: Yes 5-8  Intermediate risk for MAY: Yes 3-4  Low risk for MAY: 0-2

## 2025-05-12 ENCOUNTER — OFFICE VISIT (OUTPATIENT)
Dept: ENDOCRINOLOGY | Facility: CLINIC | Age: 30
End: 2025-05-12
Payer: COMMERCIAL

## 2025-05-12 VITALS
HEART RATE: 86 BPM | SYSTOLIC BLOOD PRESSURE: 130 MMHG | WEIGHT: 189 LBS | BODY MASS INDEX: 27.06 KG/M2 | DIASTOLIC BLOOD PRESSURE: 72 MMHG | HEIGHT: 70 IN

## 2025-05-12 DIAGNOSIS — E23.0 HYPOGONADOTROPIC HYPOGONADISM: Primary | ICD-10-CM

## 2025-05-12 LAB
BASOPHILS # BLD AUTO: 0.1 10*3/MM3 (ref 0–0.2)
BASOPHILS NFR BLD AUTO: 1 % (ref 0–1.5)
DEPRECATED RDW RBC AUTO: 39.4 FL (ref 37–54)
EOSINOPHIL # BLD AUTO: 0.25 10*3/MM3 (ref 0–0.4)
EOSINOPHIL NFR BLD AUTO: 2.5 % (ref 0.3–6.2)
ERYTHROCYTE [DISTWIDTH] IN BLOOD BY AUTOMATED COUNT: 12.5 % (ref 12.3–15.4)
HCT VFR BLD AUTO: 47.7 % (ref 37.5–51)
HGB BLD-MCNC: 16.3 G/DL (ref 13–17.7)
IMM GRANULOCYTES # BLD AUTO: 0.04 10*3/MM3 (ref 0–0.05)
IMM GRANULOCYTES NFR BLD AUTO: 0.4 % (ref 0–0.5)
LYMPHOCYTES # BLD AUTO: 3.49 10*3/MM3 (ref 0.7–3.1)
LYMPHOCYTES NFR BLD AUTO: 34.5 % (ref 19.6–45.3)
MCH RBC QN AUTO: 29.9 PG (ref 26.6–33)
MCHC RBC AUTO-ENTMCNC: 34.2 G/DL (ref 31.5–35.7)
MCV RBC AUTO: 87.5 FL (ref 79–97)
MONOCYTES # BLD AUTO: 0.52 10*3/MM3 (ref 0.1–0.9)
MONOCYTES NFR BLD AUTO: 5.1 % (ref 5–12)
NEUTROPHILS NFR BLD AUTO: 5.72 10*3/MM3 (ref 1.7–7)
NEUTROPHILS NFR BLD AUTO: 56.5 % (ref 42.7–76)
NRBC BLD AUTO-RTO: 0 /100 WBC (ref 0–0.2)
PLATELET # BLD AUTO: 212 10*3/MM3 (ref 140–450)
PMV BLD AUTO: 12.2 FL (ref 6–12)
RBC # BLD AUTO: 5.45 10*6/MM3 (ref 4.14–5.8)
WBC NRBC COR # BLD AUTO: 10.12 10*3/MM3 (ref 3.4–10.8)

## 2025-05-12 PROCEDURE — 84403 ASSAY OF TOTAL TESTOSTERONE: CPT | Performed by: INTERNAL MEDICINE

## 2025-05-12 PROCEDURE — 85025 COMPLETE CBC W/AUTO DIFF WBC: CPT | Performed by: INTERNAL MEDICINE

## 2025-05-12 PROCEDURE — 99213 OFFICE O/P EST LOW 20 MIN: CPT | Performed by: INTERNAL MEDICINE

## 2025-05-12 RX ORDER — TESTOSTERONE UNDECANOATE 237 MG/1
1 CAPSULE, LIQUID FILLED ORAL EVERY 12 HOURS
Qty: 180 CAPSULE | Refills: 1 | Status: SHIPPED | OUTPATIENT
Start: 2025-05-12
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
## 2025-05-13 ENCOUNTER — RESULTS FOLLOW-UP (OUTPATIENT)
Dept: ENDOCRINOLOGY | Facility: CLINIC | Age: 30
End: 2025-05-13
Payer: COMMERCIAL

## 2025-05-13 LAB — TESTOST SERPL-MCNC: 202 NG/DL (ref 249–836)

## 2025-05-13 NOTE — LETTER
Blade CASSIDY [facility]  [address] [place] [address]    May 13, 2025     Dear Mr. Hall:    Below are the results from your recent visit:    Resulted Orders   CBC Auto Differential   Result Value Ref Range    WBC 10.12 3.40 - 10.80 10*3/mm3    RBC 5.45 4.14 - 5.80 10*6/mm3    Hemoglobin 16.3 13.0 - 17.7 g/dL    Hematocrit 47.7 37.5 - 51.0 %    MCV 87.5 79.0 - 97.0 fL    MCH 29.9 26.6 - 33.0 pg    MCHC 34.2 31.5 - 35.7 g/dL    RDW 12.5 12.3 - 15.4 %    RDW-SD 39.4 37.0 - 54.0 fl    MPV 12.2 (H) 6.0 - 12.0 fL    Platelets 212 140 - 450 10*3/mm3    Neutrophil % 56.5 42.7 - 76.0 %    Lymphocyte % 34.5 19.6 - 45.3 %    Monocyte % 5.1 5.0 - 12.0 %    Eosinophil % 2.5 0.3 - 6.2 %    Basophil % 1.0 0.0 - 1.5 %    Immature Grans % 0.4 0.0 - 0.5 %    Neutrophils, Absolute 5.72 1.70 - 7.00 10*3/mm3    Lymphocytes, Absolute 3.49 (H) 0.70 - 3.10 10*3/mm3    Monocytes, Absolute 0.52 0.10 - 0.90 10*3/mm3    Eosinophils, Absolute 0.25 0.00 - 0.40 10*3/mm3    Basophils, Absolute 0.10 0.00 - 0.20 10*3/mm3    Immature Grans, Absolute 0.04 0.00 - 0.05 10*3/mm3    nRBC 0.0 0.0 - 0.2 /100 WBC   Testosterone   Result Value Ref Range    Testosterone, Total 202.00 (L) 249.00 - 836.00 ng/dL       Testosterone level is low (due to no dosing of jatenzo for 24 hours).    Red and white cell counts are normal overall.  We will continue to monitor     If you have any questions or concerns, please don't hesitate to call.         Sincerely,        Roxanna Diaz MD

## 2025-05-23 ENCOUNTER — LAB (OUTPATIENT)
Dept: LAB | Facility: HOSPITAL | Age: 30
End: 2025-05-23
Payer: COMMERCIAL

## 2025-05-23 ENCOUNTER — OFFICE VISIT (OUTPATIENT)
Dept: INTERNAL MEDICINE | Facility: CLINIC | Age: 30
End: 2025-05-23
Payer: COMMERCIAL

## 2025-05-23 VITALS
BODY MASS INDEX: 26.43 KG/M2 | HEART RATE: 72 BPM | OXYGEN SATURATION: 96 % | SYSTOLIC BLOOD PRESSURE: 108 MMHG | TEMPERATURE: 98.2 F | WEIGHT: 184.6 LBS | DIASTOLIC BLOOD PRESSURE: 64 MMHG | HEIGHT: 70 IN

## 2025-05-23 DIAGNOSIS — E78.2 MIXED HYPERLIPIDEMIA: ICD-10-CM

## 2025-05-23 DIAGNOSIS — E78.2 MIXED HYPERLIPIDEMIA: Primary | ICD-10-CM

## 2025-05-23 DIAGNOSIS — R37 SEXUAL DYSFUNCTION: ICD-10-CM

## 2025-05-23 DIAGNOSIS — R73.01 IFG (IMPAIRED FASTING GLUCOSE): ICD-10-CM

## 2025-05-23 DIAGNOSIS — K21.9 GASTROESOPHAGEAL REFLUX DISEASE WITHOUT ESOPHAGITIS: ICD-10-CM

## 2025-05-23 DIAGNOSIS — F41.1 GENERALIZED ANXIETY DISORDER: ICD-10-CM

## 2025-05-23 DIAGNOSIS — K13.0 ANGULAR CHEILOSIS: ICD-10-CM

## 2025-05-23 LAB
ALBUMIN SERPL-MCNC: 4.8 G/DL (ref 3.5–5.2)
ALBUMIN/GLOB SERPL: 1.4 G/DL
ALP SERPL-CCNC: 62 U/L (ref 39–117)
ALT SERPL W P-5'-P-CCNC: 30 U/L (ref 1–41)
ANION GAP SERPL CALCULATED.3IONS-SCNC: 13 MMOL/L (ref 5–15)
AST SERPL-CCNC: 34 U/L (ref 1–40)
BILIRUB SERPL-MCNC: 0.9 MG/DL (ref 0–1.2)
BUN SERPL-MCNC: 12 MG/DL (ref 6–20)
BUN/CREAT SERPL: 14.8 (ref 7–25)
CALCIUM SPEC-SCNC: 9.7 MG/DL (ref 8.6–10.5)
CHLORIDE SERPL-SCNC: 100 MMOL/L (ref 98–107)
CHOLEST SERPL-MCNC: 231 MG/DL (ref 0–200)
CO2 SERPL-SCNC: 26 MMOL/L (ref 22–29)
CREAT SERPL-MCNC: 0.81 MG/DL (ref 0.76–1.27)
EGFRCR SERPLBLD CKD-EPI 2021: 120.9 ML/MIN/1.73
GLOBULIN UR ELPH-MCNC: 3.4 GM/DL
GLUCOSE SERPL-MCNC: 74 MG/DL (ref 65–99)
HBA1C MFR BLD: 5.4 % (ref 4.8–5.6)
HDLC SERPL-MCNC: 30 MG/DL (ref 40–60)
LDLC SERPL CALC-MCNC: 181 MG/DL (ref 0–100)
LDLC/HDLC SERPL: 5.96 {RATIO}
POTASSIUM SERPL-SCNC: 3.9 MMOL/L (ref 3.5–5.2)
PROT SERPL-MCNC: 8.2 G/DL (ref 6–8.5)
SODIUM SERPL-SCNC: 139 MMOL/L (ref 136–145)
TRIGL SERPL-MCNC: 111 MG/DL (ref 0–150)
VLDLC SERPL-MCNC: 20 MG/DL (ref 5–40)

## 2025-05-23 PROCEDURE — 83036 HEMOGLOBIN GLYCOSYLATED A1C: CPT

## 2025-05-23 PROCEDURE — 99213 OFFICE O/P EST LOW 20 MIN: CPT | Performed by: INTERNAL MEDICINE

## 2025-05-23 PROCEDURE — 80053 COMPREHEN METABOLIC PANEL: CPT

## 2025-05-23 PROCEDURE — 80061 LIPID PANEL: CPT

## 2025-05-23 RX ORDER — OMEPRAZOLE 40 MG/1
40 CAPSULE, DELAYED RELEASE ORAL DAILY
Qty: 90 CAPSULE | Refills: 1 | Status: SHIPPED | OUTPATIENT
Start: 2025-05-23

## 2025-05-23 RX ORDER — HYDROXYZINE HYDROCHLORIDE 10 MG/1
10 TABLET, FILM COATED ORAL 3 TIMES DAILY PRN
Qty: 90 TABLET | Refills: 1 | Status: SHIPPED | OUTPATIENT
Start: 2025-05-23

## 2025-05-23 RX ORDER — NYSTATIN AND TRIAMCINOLONE ACETONIDE 100000; 1 [USP'U]/G; MG/G
1 OINTMENT TOPICAL 2 TIMES DAILY
Qty: 60 G | Refills: 1 | Status: SHIPPED | OUTPATIENT
Start: 2025-05-23

## 2025-05-23 RX ORDER — DESVENLAFAXINE 50 MG/1
50 TABLET, FILM COATED, EXTENDED RELEASE ORAL DAILY
Qty: 90 TABLET | Refills: 2 | Status: SHIPPED | OUTPATIENT
Start: 2025-05-23

## 2025-05-23 RX ORDER — SILDENAFIL 100 MG/1
50-100 TABLET, FILM COATED ORAL DAILY PRN
Qty: 30 TABLET | Refills: 1 | Status: SHIPPED | OUTPATIENT
Start: 2025-05-23

## 2025-05-23 NOTE — PROGRESS NOTES
"Anxiety (Having increased issues due to job issues.)    Subjective   Blade Hall is a 29 y.o. male is here today for follow-up.    Anxiety    Symptoms: no chest pain, no confusion, no dizziness, no nausea, no palpitations and no shortness of breath      Baby is now 3 month old.  Company is moving, have a job after next week.  This has added on to her stress.  Reports he has started eating better, avoiding red meat and eating more chicken and fish.  So working out more.  Here for follow-up of his hyperlipidemia and anxiety.  Does not think he needs to go up on the Pristiq.    Current Outpatient Medications:     B-D 3CC LUER-RENETTA SYR 17QF2-1/2 21G X 1-1/2\" 3 ML misc, USE 2 PER MONTH TO ADMINISTER TESTOSTERONE, Disp: 13 each, Rfl: 3    desvenlafaxine (Pristiq) 50 MG 24 hr tablet, Take 1 tablet by mouth Daily. Replaces viibryd, Disp: 90 tablet, Rfl: 2    fluticasone (FLONASE) 50 MCG/ACT nasal spray, 2 sprays by Each Nare route Daily., Disp: 16 g, Rfl: 5    Jatenzo 237 MG capsule, Take 1 capsule by mouth Every 12 (Twelve) Hours., Disp: 180 capsule, Rfl: 1    nystatin-triamcinolone (MYCOLOG) 768521-0.1 UNIT/GM-% ointment, Apply 1 Application topically to the appropriate area as directed 2 (Two) Times a Day., Disp: 60 g, Rfl: 1    omeprazole (priLOSEC) 40 MG capsule, Take 1 capsule by mouth Daily., Disp: 90 capsule, Rfl: 1    sildenafil (Viagra) 100 MG tablet, Take 0.5-1 tablets by mouth Daily As Needed for Erectile Dysfunction., Disp: 30 tablet, Rfl: 1    hydrOXYzine (ATARAX) 10 MG tablet, Take 1 tablet by mouth 3 (Three) Times a Day As Needed for Anxiety., Disp: 90 tablet, Rfl: 1      The following portions of the patient's history were reviewed and updated as appropriate: allergies, current medications, past family history, past medical history, past social history, past surgical history and problem list.    Review of Systems   Constitutional: Negative.  Negative for chills and fever.   HENT:  Negative for ear " "discharge, ear pain, sinus pressure and sore throat.    Respiratory:  Negative for cough, chest tightness and shortness of breath.    Cardiovascular:  Negative for chest pain, palpitations and leg swelling.   Gastrointestinal:  Negative for diarrhea, nausea and vomiting.   Musculoskeletal:  Negative for arthralgias, back pain and myalgias.   Neurological:  Negative for dizziness, syncope and headaches.   Psychiatric/Behavioral:  Positive for dysphoric mood. Negative for confusion and sleep disturbance.        Objective   /64   Pulse 72   Temp 98.2 °F (36.8 °C)   Ht 177.8 cm (70\")   Wt 83.7 kg (184 lb 9.6 oz)   SpO2 96% Comment: ra  BMI 26.49 kg/m²   Physical Exam  Vitals and nursing note reviewed.   Constitutional:       Appearance: He is well-developed.   HENT:      Head: Normocephalic and atraumatic.      Right Ear: External ear normal.      Left Ear: External ear normal.      Mouth/Throat:      Pharynx: No oropharyngeal exudate.   Eyes:      Conjunctiva/sclera: Conjunctivae normal.      Pupils: Pupils are equal, round, and reactive to light.   Neck:      Thyroid: No thyromegaly.   Cardiovascular:      Rate and Rhythm: Normal rate and regular rhythm.      Pulses: Normal pulses.      Heart sounds: Normal heart sounds. No murmur heard.     No friction rub. No gallop.   Pulmonary:      Effort: Pulmonary effort is normal.      Breath sounds: Normal breath sounds.   Abdominal:      General: Bowel sounds are normal. There is no distension.      Palpations: Abdomen is soft.      Tenderness: There is no abdominal tenderness.   Musculoskeletal:      Cervical back: Neck supple.   Skin:     General: Skin is warm and dry.   Neurological:      Mental Status: He is alert and oriented to person, place, and time.      Cranial Nerves: No cranial nerve deficit.   Psychiatric:         Judgment: Judgment normal.                 Results for orders placed or performed in visit on 05/12/25   CBC Auto Differential    " Collection Time: 05/12/25  2:21 PM    Specimen: Blood   Result Value Ref Range    WBC 10.12 3.40 - 10.80 10*3/mm3    RBC 5.45 4.14 - 5.80 10*6/mm3    Hemoglobin 16.3 13.0 - 17.7 g/dL    Hematocrit 47.7 37.5 - 51.0 %    MCV 87.5 79.0 - 97.0 fL    MCH 29.9 26.6 - 33.0 pg    MCHC 34.2 31.5 - 35.7 g/dL    RDW 12.5 12.3 - 15.4 %    RDW-SD 39.4 37.0 - 54.0 fl    MPV 12.2 (H) 6.0 - 12.0 fL    Platelets 212 140 - 450 10*3/mm3    Neutrophil % 56.5 42.7 - 76.0 %    Lymphocyte % 34.5 19.6 - 45.3 %    Monocyte % 5.1 5.0 - 12.0 %    Eosinophil % 2.5 0.3 - 6.2 %    Basophil % 1.0 0.0 - 1.5 %    Immature Grans % 0.4 0.0 - 0.5 %    Neutrophils, Absolute 5.72 1.70 - 7.00 10*3/mm3    Lymphocytes, Absolute 3.49 (H) 0.70 - 3.10 10*3/mm3    Monocytes, Absolute 0.52 0.10 - 0.90 10*3/mm3    Eosinophils, Absolute 0.25 0.00 - 0.40 10*3/mm3    Basophils, Absolute 0.10 0.00 - 0.20 10*3/mm3    Immature Grans, Absolute 0.04 0.00 - 0.05 10*3/mm3    nRBC 0.0 0.0 - 0.2 /100 WBC   Testosterone    Collection Time: 05/12/25  2:21 PM    Specimen: Arm, Left; Blood   Result Value Ref Range    Testosterone, Total 202.00 (L) 249.00 - 836.00 ng/dL             Assessment & Plan   Diagnoses and all orders for this visit:    Mixed hyperlipidemia  -     Comprehensive Metabolic Panel; Future  -     Lipid Panel; Future    Sexual dysfunction  -     sildenafil (Viagra) 100 MG tablet; Take 0.5-1 tablets by mouth Daily As Needed for Erectile Dysfunction.    Gastroesophageal reflux disease without esophagitis  -     omeprazole (priLOSEC) 40 MG capsule; Take 1 capsule by mouth Daily.    Angular cheilosis  -     nystatin-triamcinolone (MYCOLOG) 102305-4.1 UNIT/GM-% ointment; Apply 1 Application topically to the appropriate area as directed 2 (Two) Times a Day.    Generalized anxiety disorder  Comments:  start viibryd  Orders:  -     desvenlafaxine (Pristiq) 50 MG 24 hr tablet; Take 1 tablet by mouth Daily. Replaces viibryd  -     hydrOXYzine (ATARAX) 10 MG tablet; Take  1 tablet by mouth 3 (Three) Times a Day As Needed for Anxiety.    IFG (impaired fasting glucose)  -     Hemoglobin A1c; Future                 Return for Annual as scheduled.    Electronically signed by:    Bre Bull MD

## 2025-07-30 ENCOUNTER — PRIOR AUTHORIZATION (OUTPATIENT)
Dept: ENDOCRINOLOGY | Facility: CLINIC | Age: 30
End: 2025-07-30
Payer: COMMERCIAL

## 2025-08-01 NOTE — TELEPHONE ENCOUNTER
CHRISTEN EITAN (Key: [id number])  PA Case ID #: [id number]  Rx #: [id number]  Need Help? Call us at [phone]  Outcome  [facility] July 31 [facility]  Your PA request has been denied. Additional information will be provided in the denial communication. (Message 1140)  Drug  Jatenzo 237MG capsules  ePA cloud logo  Form  [facility] Electronic PA Form (2017 [employer])  Original Claim Info  70,MR TRY GENERICS FIRST OR CALL [id number].NON-FORMULARY DRUG, CONTACT PRESCRIBER

## 2025-08-01 NOTE — TELEPHONE ENCOUNTER
Your request has been denied. Your doctor can send us any new or missing information for us to review. The preferred products for your plan are: Natesto; testosterone 1% gel (generic of Androgel 1% and generic of Testim 1%); testosterone 1.62% gel; testosterone 2% gel (generic of Fortesta 2%), testosterone solution, testosterone cypionate, testosterone enanthate.

## 2025-08-01 NOTE — TELEPHONE ENCOUNTER
He has tried and failed testosterone cypionate and testosterone enanthate- had erratic hormone effect with injection  He has contraindication for topical therapy- has young children at home including baby and there is concern about unintentional exposure and safety   camille Ledesma

## 2025-08-04 NOTE — TELEPHONE ENCOUNTER
The preferred products for your plan are: Natesto; testosterone 1% gel (generic of Androgel 1% and generic of Testim 1%); testosterone 1.62% gel; testosterone 2% gel (generic of Fortesta 2%), testosterone solution, testosterone cypionate, testosterone enanthate    I put in the PA and the appeal the following: Testosterone Enanthate, Testosterone Undecanoate, and young children in the home    Please advise.

## 2025-08-07 ENCOUNTER — TELEPHONE (OUTPATIENT)
Dept: INTERNAL MEDICINE | Facility: CLINIC | Age: 30
End: 2025-08-07
Payer: COMMERCIAL

## 2025-08-12 ENCOUNTER — TELEPHONE (OUTPATIENT)
Dept: ENDOCRINOLOGY | Facility: CLINIC | Age: 30
End: 2025-08-12
Payer: COMMERCIAL

## 2025-08-18 ENCOUNTER — TELEPHONE (OUTPATIENT)
Dept: INTERNAL MEDICINE | Facility: CLINIC | Age: 30
End: 2025-08-18
Payer: COMMERCIAL

## 2025-08-18 DIAGNOSIS — E23.0 HYPOGONADOTROPIC HYPOGONADISM: ICD-10-CM

## 2025-08-18 RX ORDER — SYRINGE WITH NEEDLE, 1 ML 25GX5/8"
SYRINGE, EMPTY DISPOSABLE MISCELLANEOUS
Qty: 2 EACH | Refills: 5 | Status: SHIPPED | OUTPATIENT
Start: 2025-08-18 | End: 2025-08-18 | Stop reason: SDUPTHER

## 2025-08-18 RX ORDER — TESTOSTERONE ENANTHATE 200 MG/ML
200 INJECTION, SOLUTION INTRAMUSCULAR
Qty: 5 ML | Refills: 1 | Status: SHIPPED | OUTPATIENT
Start: 2025-08-18 | End: 2025-08-18 | Stop reason: SDUPTHER

## 2025-08-19 RX ORDER — TESTOSTERONE ENANTHATE 200 MG/ML
200 INJECTION, SOLUTION INTRAMUSCULAR
Qty: 5 ML | Refills: 1 | Status: SHIPPED | OUTPATIENT
Start: 2025-08-19

## 2025-08-19 RX ORDER — SYRINGE WITH NEEDLE, 1 ML 25GX5/8"
SYRINGE, EMPTY DISPOSABLE MISCELLANEOUS
Qty: 2 EACH | Refills: 5 | Status: SHIPPED | OUTPATIENT
Start: 2025-08-19